# Patient Record
Sex: FEMALE | Race: WHITE | Employment: OTHER | URBAN - METROPOLITAN AREA
[De-identification: names, ages, dates, MRNs, and addresses within clinical notes are randomized per-mention and may not be internally consistent; named-entity substitution may affect disease eponyms.]

---

## 2017-01-03 RX ORDER — AZATHIOPRINE 50 MG/1
TABLET ORAL
Qty: 60 TAB | Refills: 3 | Status: SHIPPED | OUTPATIENT
Start: 2017-01-03 | End: 2017-01-05 | Stop reason: SDUPTHER

## 2017-01-05 RX ORDER — TACROLIMUS 1 MG/1
2 CAPSULE ORAL 2 TIMES DAILY
Qty: 120 CAP | Refills: 5 | Status: SHIPPED | OUTPATIENT
Start: 2017-01-05 | End: 2017-08-29 | Stop reason: SDUPTHER

## 2017-01-05 RX ORDER — AZATHIOPRINE 50 MG/1
TABLET ORAL
Qty: 60 TAB | Refills: 3 | Status: SHIPPED | OUTPATIENT
Start: 2017-01-05 | End: 2017-11-29 | Stop reason: SDUPTHER

## 2017-01-30 DIAGNOSIS — K75.4 AUTOIMMUNE HEPATITIS (HCC): ICD-10-CM

## 2017-02-22 ENCOUNTER — OFFICE VISIT (OUTPATIENT)
Dept: HEMATOLOGY | Age: 67
End: 2017-02-22

## 2017-02-22 VITALS
TEMPERATURE: 98.9 F | OXYGEN SATURATION: 95 % | DIASTOLIC BLOOD PRESSURE: 90 MMHG | BODY MASS INDEX: 24.33 KG/M2 | WEIGHT: 132.2 LBS | HEART RATE: 77 BPM | RESPIRATION RATE: 19 BRPM | HEIGHT: 62 IN | SYSTOLIC BLOOD PRESSURE: 154 MMHG

## 2017-02-22 DIAGNOSIS — R93.3 ABNORMAL FINDINGS ON DIAGNOSTIC IMAGING OF OTHER PARTS OF DIGESTIVE TRACT: ICD-10-CM

## 2017-02-22 DIAGNOSIS — K75.4 AUTOIMMUNE HEPATITIS (HCC): Primary | ICD-10-CM

## 2017-02-22 DIAGNOSIS — R79.9 ABNORMAL FINDING OF BLOOD CHEMISTRY: ICD-10-CM

## 2017-02-22 RX ORDER — PREDNISONE 5 MG/1
5 TABLET ORAL DAILY
Qty: 30 TAB | Refills: 3 | Status: SHIPPED | OUTPATIENT
Start: 2017-02-22 | End: 2018-04-25

## 2017-02-22 NOTE — PROGRESS NOTES
[address] White MD, FACP, [facility]     April MARY ANN Tong PA-C Laymon Evangelist, MD, MD Dwain Rainey NP Ebony Rape, NP Laan Van Nieuw [facility], [address] [facility], [address]     [phone]     FAX: [address] [place] [address], [address] [place] [address], [address]     [phone]     FAX: [phone]       Patient Care Team:  Duc Reyes MD as PCP - General (Family Practice)  Kiara Segovia MD (Gastroenterology)      Problem List  Date Reviewed: 11/16/2016          Codes Class Noted    Autoimmune hepatitis [facility]) ICD-10-CM: K75.4  ICD-9-CM: 571.42  3/18/2016        Hypertension ICD-10-CM: I10  ICD-9-CM: 401.9  3/18/2016        Hypercholesterolemia ICD-10-CM: E78.00  ICD-9-CM: 272.0  3/18/2016              Ally Velazquez returns to the [address] for management of autoimmune Hepatitis. The active problem list, all pertinent past medical history, medications, liver histology, and laboratory findings related to the liver disorder were reviewed with the patient. The patient is a 78 y.o.  female who was first noted to have abnormalities in liver transaminases into the 1000 range in 12/2015. Serologic evaluation for markers of chronic liver disease were positive for KATLYN and ASMA. Ultrasound of the liver was performed in 7/2015. The results of the imaging demonstrated a normal appearing liver. A liver biopsy was performed in 11/2015. The biopsy slides were reviewed by Dr. Rosario Nicole and demonstrated a severe active hepatitis with bridging necrosis and fibrosis. The patient had been taking Lipitor. This was stopped when the liver enzymes were noted to be elevated. The liver enzymes have remain elevated. She has not yet restarted treatment for cholesterol.     The patient initiated treatment with prednisone 30 mg every day and imuran 50 mg every day in 11/2015. The liver enzymes declined from 1000 to about 300-400 range and then stabilized. Since establishing with this office, we had increased the dose of the Imuran to 100 mg daily and decreased the prednisone to 20 mg daily. She had been on this dose for ~6 months and due to lack of response to therapy, we added tacrolimus to her regimen in 10/2017. Initially, she had been taking 1 mg bid instead of 2 mg bid as ordered, but increased to full 2 mg bid dosing in 11/2016. She is tolerating medications well without GI side effects. We have continued to decrease dosing of prednisone and she is now on 10 mg for the past 2 months. She denies insomnia, irritability with ongoing use of prednisone but is anxious to discontinue this dose as soon as is reasonable. The most recent laboratory studies indicate that the liver transaminases are minimally elevated, ALP is normal, tests of hepatic synthetic and metabolic function are normal, and the platelet count is normal.  Her renal function and tacrolimus levels are appropriate. The patient has no symptoms which could be attributed to the liver disorder. The patient completes all daily activities without any functional limitations. The patient has not experienced fatigue, pain in the right side over the liver, problems concentrating, swelling of the abdomen, swelling of the lower extremities, hematemesis, hematochezia. Her only new concerns at this time is of some right sided rib pain. She thinks she may have injured the are in leaning into a large washing machine recently. This appears to be decreasing in severity on its own. ALLERGIES  No Known Allergies    MEDICATIONS  Current Outpatient Prescriptions   Medication Sig    azaTHIOprine (IMURAN) 50 mg tablet TAKE 1 TABLET BY MOUTH TWICE A DAY    tacrolimus (PROGRAF) 1 mg capsule Take 2 Caps by mouth two (2) times a day.  lisinopril (PRINIVIL, ZESTRIL) 20 mg tablet Take  by mouth daily.  predniSONE (DELTASONE) 10 mg tablet Take  by mouth three (3) times daily.  diphenhydrAMINE (BENADRYL) 25 mg capsule Take 25 mg by mouth once. No current facility-administered medications for this visit. SYSTEM REVIEW NOT RELATED TO LIVER DISEASE OR REVIEWED ABOVE:  Constitution systems: Negative for fever, chills, weight gain, weight loss. Eyes: Negative for visual changes. ENT: Negative for sore throat, painful swallowing. Respiratory: Negative for cough, hemoptysis, SOB. Cardiology: Negative for chest pain, palpitations. GI:  Negative for constipation or diarrhea. : Negative for urinary frequency, dysuria, hematuria, nocturia. Skin: Negative for rash. Hematology: Negative for easy bruising, blood clots. Musculo-skeletal: Negative for back pain, muscle pain, weakness. Neurologic: Negative for headaches, dizziness, vertigo, memory problems not related to HE. Psychology: Negative for anxiety, depression. FAMILY HISTORY:  The father  at age 80 years. The mother is alive and healthy at age 80 years. There is no family history of liver disease. There is no family history of immune disorders. SOCIAL HISTORY:  The patient is . The patient has 3 children, and 5 grandchildren. The patient has never used tobacco products. The patient consumes 1 alcoholic beverages per day historically. She discontinued alcohol with recognition of liver problem. The patient used to work in banking. The patient retired in . PHYSICAL EXAMINATION:  Visit Vitals    /90 (BP 1 Location: Left arm, BP Patient Position: Sitting)    Pulse 77    Temp 98.9 °F (37.2 °C) (Tympanic)    Resp 19    Ht 5' 2\" (1.575 m)    Wt 132 lb 3.2 oz (60 kg)    SpO2 95%    BMI 24.18 kg/m2     General: No acute distress. Eyes: Sclera anicteric. ENT: No oral lesions. Thyroid normal.  Nodes: No adenopathy. Skin: No spider angiomata. No jaundice. No palmar erythema. Respiratory: Lungs clear to auscultation. Cardiovascular: Regular heart rate. No murmurs. No JVD. Abdomen: Soft non-tender. Liver size normal to percussion/palpation. Spleen not palpable. No obvious ascites. Extremities: No edema. No muscle wasting. No gross arthritic changes. Neurologic: Alert and oriented. Cranial nerves grossly intact. No asterixis. LABORATORY STUDIES:  [facility] of [address] [place] [address] Units 1/26/2017 12/12/2016 11/14/2016 10/24/2016 9/15/2016 7/11/2016 6/14/2016   WBC 3.4 - 10.8 x10E3/uL 8.7 9.2 8.9 8.5 5.8 7.2 5.7   ANC 1.4 - 7.0 x10E3/uL - - - - - - -   HGB 11.1 - 15.9 g/dL 14.1 14.4 15.2 15.0 14.6 14.4 14.8    - 379 x10E3/uL 198 219 202 200 198 212 202   AST 0 - 40 IU/L 45(H) 40 43(H) 48(H) 68(H) 67(H) 85(H)   ALT 0 - 32 IU/L 38(H) 35(H) 43(H) 50(H) 65(H) 74(H) 91(H)   Alk Phos 39 - 117 IU/L 51 51 53 50 54 52 57   Bili, Total 0.0 - 1.2 mg/dL 0.7 0.3 0.5 0.4 0.4 0.5 0.7   Bili, Direct 0.00 - 0.40 mg/dL 0.18 0.09 0.14 0.14 0.14 0.15 0.17   Albumin 3.6 - 4.8 g/dL 4.6 4.5 4.7 4.5 4.3 4.4 4.2   BUN 8 - 27 mg/dL 18 23 15 15 15 10 14   Creat 0.57 - 1.00 mg/dL 0.87 0.91 0.83 0.87 0.80 0.73 0.83   Na 134 - 144 mmol/L 139 142 141 139 138 141 140   K 3.5 - 5.2 mmol/L 4.7 4.6 4.4 4.3 4.5 4.4 4.5   Cl 96 - 106 mmol/L 98 100 100 97 97 101 96(L)   CO2 18 - 29 mmol/L 23 22 23 23 26 23 22   Glucose 65 - 99 mg/dL 120(H) 143(H) 132(H) 137(H) 101(H) 126(H) 141(H)     SEROLOGIES:  Serologies Latest Ref Rng 3/18/2016   Hep A Ab, Total Negative Positive (A)   Hep B Surface Ag Negative Negative   KATLYN, IFA  See patterns   KATLYN Pattern  1:1280 (H)   C-ANCA Neg:<1:20 titer <1:20   P-ANCA Neg:<1:20 titer <1:20   ANCA Neg:<1:20 titer <1:20   ASMCA 0 - 19 Units 71 (H)   M2 Ab 0.0 - 20.0 Units 11.4   Anti-SLA 0.0 - 20.0 units 1.0   LKM 0.0 - 20.0 Units 1.4   10/2015.   Anti-HCV negative, KATLYN negative, ASMA positive    LIVER HISTOLOGY:  11/2015. Slides reviewed by MLS. Severe active hepatitis with PMN, bridging necrosis and bridging fibrosis. 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 She has been on this combination of doses for the past roughly 5-6 weeks and is tolerating medications well. She continues to have stable liver enzymes at the upper limit of normal.  I have advised her to decrease the dose of the prednisone to 7.5 mg daily, maintain the azathioprine at 100 mg daily and tacrolimus at 2 mg bid. We will then have her follow-up for repeat lab testing and tac level in 6 weeks for possible further reduction in prednisone. Will perform laboratory testing to monitor liver function and degree of liver injury every 6-12 weeks. This will include BMP, hepatic panel, CBC with platelet count and tac level. Serologic and virologic studies to assess for other causes of chronic liver disease were negative. Hyperlipidemia. Patient reports that she has been off of any sort of cholesterol lowering agent for >1 year. I would recommend delay of initiation of this medication until her liver enzymes normalize with therapy. This will reduce confusion of response to immunosuppression if she should have a flare in enzymes due to statin use. Would recommend avoiding Lipitor in the future. Elevated glucose levels. This has been high on occasion in past labs, but likely reflects her long-term use of prednisone. Will draw Hgb A1c at next assessment. The patient was directed to continue all current medications at the current dosages.   There are no contraindications for the patient to take any medications that are necessary for treatment of other medical issues. The patient was counseled regarding alcohol consumption. She was advised to continue with moderation of alcohol until we get the process under control. The need for vaccination against viral hepatitis B will be assessed with serologic and instituted as appropriate. She is immune to hepatitis A. All of the above issues were discussed with the patient. All questions were answered. The patient expressed a clear understanding of the above. [address] [place] [address] in 6 weeks for repeat lab studies, 12 weeks for office visit.     Franco Swan PA-C  Liver [place] [address], [address] [address]  [address].  [phone]

## 2017-02-22 NOTE — MR AVS SNAPSHOT
Visit Information Date & Time Provider Department Dept. Phone Encounter #  
 2/22/2017  9:00 AM [facility] [address] [facility] [id number] Follow-up Instructions Return in about 3 months (around 5/22/2017) for Ezra Salazar. Your Appointments 5/22/2017  2:00 PM  
Follow Up with CHRISTOPH [facility]inal [facility] [address] ([facility] Appt Note: follow up [address] [facility] [place] [phone] [facility] [address]  
[address] [facility] [place] [address] Upcoming Health Maintenance Date Due Hepatitis C Screening 1950 DTaP/Tdap/Td series (1 - Tdap) 1/29/1972 BREAST CANCER SCRN MAMMOGRAM 1/29/2001 FOBT Q 1 YEAR AGE 50-75 1/29/2001 ZOSTER VACCINE AGE 60> 1/29/2011 GLAUCOMA SCREENING Q2Y 1/29/2016 OSTEOPOROSIS SCREENING (DEXA) 1/29/2016 Pneumococcal 65+ Low/Medium Risk (1 of 2 - PCV13) 1/29/2016 MEDICARE YEARLY EXAM 1/29/2016 INFLUENZA AGE 9 TO ADULT 8/1/2016 Allergies as of 2/22/2017  Review Complete On: 2/22/2017 By: CHRISTOPH Espinal No Known Allergies Current Immunizations  Never Reviewed No immunizations on file. Not reviewed this visit You Were Diagnosed With   
  
 Codes Comments Autoimmune hepatitis ([facility] [address]    -  Primary ICD-10-CM: K75.4 ICD-9-CM: 571.42 Abnormal findings on diagnostic imaging of other parts of digestive tract     ICD-10-CM: R93.3 ICD-9-CM: 793.4 Abnormal finding of blood chemistry     ICD-10-CM: R79.9 ICD-9-CM: 790.6 Vitals BP  
  
  
  
  
  
 154/90 (BP 1 Location: Left arm, BP Patient Position: Sitting) BMI and BSA Data Body Mass Index Body Surface Area  
 24.18 kg/m 2 1.62 m 2 Preferred Pharmacy Pharmacy Name Phone [id number] [address] [phone] Your Updated Medication List  
  
   
 This list is accurate as of: 2/22/17  9:37 AM.  Always use your most recent med list.  
  
  
  
  
 azaTHIOprine 50 mg tablet Commonly known as:  IMURAN  
TAKE 1 TABLET BY MOUTH TWICE A DAY  
  
 diphenhydrAMINE 25 mg capsule Commonly known as:  BENADRYL Take 25 mg by mouth once. lisinopril 20 mg tablet Commonly known as:  Flor Hu Take  by mouth daily. * predniSONE 10 mg tablet Commonly known as:  Reino Matthew Take  by mouth three (3) times daily. * predniSONE 5 mg tablet Commonly known as:  Reino Matthew Take 1 Tab by mouth daily. tacrolimus 1 mg capsule Commonly known as:  PROGRAF Take 2 Caps by mouth two (2) times a day. * Notice: This list has 2 medication(s) that are the same as other medications prescribed for you. Read the directions carefully, and ask your doctor or other care provider to review them with you. Prescriptions Sent to Pharmacy Refills  
 predniSONE (DELTASONE) 5 mg tablet 3 Sig: Take 1 Tab by mouth daily. Class: Normal  
 Pharmacy: [id number] [place], [address] [place] [address] [place] Ph #: [phone] Route: Oral  
  
Follow-up Instructions Return in about 3 months (around 5/22/2017) for Xavier Katz. To-Do List   
 04/03/2017 Lab:  CBC W/O DIFF   
  
 04/03/2017 Lab:  HEMOGLOBIN A1C WITH EAG   
  
 04/03/2017 Lab:  HEPATIC FUNCTION PANEL (6)   
  
 04/03/2017 Lab:  LIPID PANEL   
  
 04/03/2017 Lab:  METABOLIC PANEL, BASIC   
  
 04/03/2017 Lab:  TACROLIMUS, WHOLE BLOOD Introducing [facility]! Dear Jaswinder Santana: Thank you for requesting a [employer] account. Our records indicate that you already have an active [employer] account. You can access your account anytime at [email or web address] Did you know that you can access your hospital and ER discharge instructions at any time in [employer]?   You can also review all of your test results from your hospital stay or ER visit. Additional Information If you have questions, please visit the Frequently Asked Questions section of the [employer] website at [email or web address] Remember, [employer] is NOT to be used for urgent needs. For medical emergencies, dial 911. Now available from your iPhone and Android! Please provide this summary of care documentation to your next provider. Your primary care clinician is listed as Christina Andrade. If you have any questions after today's visit, please call [phone].

## 2017-04-03 ENCOUNTER — TELEPHONE (OUTPATIENT)
Dept: HEMATOLOGY | Age: 67
End: 2017-04-03

## 2017-04-03 DIAGNOSIS — R79.9 ABNORMAL FINDING OF BLOOD CHEMISTRY: ICD-10-CM

## 2017-04-03 DIAGNOSIS — R93.3 ABNORMAL FINDINGS ON DIAGNOSTIC IMAGING OF OTHER PARTS OF DIGESTIVE TRACT: ICD-10-CM

## 2017-04-03 DIAGNOSIS — K75.4 AUTOIMMUNE HEPATITIS (HCC): ICD-10-CM

## 2017-04-07 LAB
ALBUMIN SERPL-MCNC: 4.4 G/DL (ref 3.6–4.8)
ALP SERPL-CCNC: 50 IU/L (ref 39–117)
ALT SERPL-CCNC: 30 IU/L (ref 0–32)
AST SERPL-CCNC: 35 IU/L (ref 0–40)
BILIRUB DIRECT SERPL-MCNC: 0.1 MG/DL (ref 0–0.4)
BILIRUB SERPL-MCNC: 0.3 MG/DL (ref 0–1.2)
BUN SERPL-MCNC: 20 MG/DL (ref 8–27)
BUN/CREAT SERPL: 24 (ref 12–28)
CALCIUM SERPL-MCNC: 9.9 MG/DL (ref 8.7–10.3)
CHLORIDE SERPL-SCNC: 99 MMOL/L (ref 96–106)
CHOLEST SERPL-MCNC: 240 MG/DL (ref 100–199)
CO2 SERPL-SCNC: 22 MMOL/L (ref 18–29)
CREAT SERPL-MCNC: 0.84 MG/DL (ref 0.57–1)
ERYTHROCYTE [DISTWIDTH] IN BLOOD BY AUTOMATED COUNT: 13.5 % (ref 12.3–15.4)
EST. AVERAGE GLUCOSE BLD GHB EST-MCNC: 131 MG/DL
GLUCOSE SERPL-MCNC: 96 MG/DL (ref 65–99)
HBA1C MFR BLD: 6.2 % (ref 4.8–5.6)
HCT VFR BLD AUTO: 42.9 % (ref 34–46.6)
HDLC SERPL-MCNC: 91 MG/DL
HGB BLD-MCNC: 14.4 G/DL (ref 11.1–15.9)
LDLC SERPL CALC-MCNC: 135 MG/DL (ref 0–99)
MCH RBC QN AUTO: 31.7 PG (ref 26.6–33)
MCHC RBC AUTO-ENTMCNC: 33.6 G/DL (ref 31.5–35.7)
MCV RBC AUTO: 95 FL (ref 79–97)
PLATELET # BLD AUTO: 194 X10E3/UL (ref 150–379)
POTASSIUM SERPL-SCNC: 4.5 MMOL/L (ref 3.5–5.2)
RBC # BLD AUTO: 4.54 X10E6/UL (ref 3.77–5.28)
SODIUM SERPL-SCNC: 141 MMOL/L (ref 134–144)
TRIGL SERPL-MCNC: 71 MG/DL (ref 0–149)
VLDLC SERPL CALC-MCNC: 14 MG/DL (ref 5–40)
WBC # BLD AUTO: 5.1 X10E3/UL (ref 3.4–10.8)

## 2017-04-08 LAB — TACROLIMUS, 700249: 4.2 NG/ML (ref 2–20)

## 2017-04-19 NOTE — PROGRESS NOTES
Spoke with patient re labs. Remain on present dose of tac and decrease pred to 5 mg now. Add psyllium for chol and recheck in 6/2017. Maintain follow-up in one month for repeat labs.

## 2017-05-30 ENCOUNTER — OFFICE VISIT (OUTPATIENT)
Dept: HEMATOLOGY | Age: 67
End: 2017-05-30

## 2017-05-30 VITALS
SYSTOLIC BLOOD PRESSURE: 157 MMHG | DIASTOLIC BLOOD PRESSURE: 90 MMHG | OXYGEN SATURATION: 97 % | BODY MASS INDEX: 24.4 KG/M2 | HEART RATE: 80 BPM | WEIGHT: 133.4 LBS | TEMPERATURE: 98 F

## 2017-05-30 DIAGNOSIS — E78.00 HYPERCHOLESTEROLEMIA: ICD-10-CM

## 2017-05-30 DIAGNOSIS — K75.4 AUTOIMMUNE HEPATITIS (HCC): Primary | ICD-10-CM

## 2017-05-30 NOTE — PROGRESS NOTES
[address], MD, FACP, [facility]     [facility], RICKIE Araujo MD, Nhi Vega MD     [address] [place] [address], MARY ANN Daniels, MARY ANN [place] [address], [address] [address]  22.     [phone]     FAX: [address] [address], [address] [place] [address]     [phone]     FAX: [phone]       Patient Care Team:  Shaun Carpenter MD as PCP - General (Family Practice)  Jono Hughes MD (Gastroenterology)      Problem List  Date Reviewed: 2/22/2017          Codes Class Noted    Autoimmune hepatitis [facility]) ICD-10-CM: K75.4  ICD-9-CM: 571.42  3/18/2016        Hypertension ICD-10-CM: I10  ICD-9-CM: 401.9  3/18/2016        Hypercholesterolemia ICD-10-CM: E78.00  ICD-9-CM: 272.0  3/18/2016              Penny Saucedo returns to the [address] for management of autoimmune Hepatitis. The active problem list, all pertinent past medical history, medications, liver histology, and laboratory findings related to the liver disorder were reviewed with the patient. The patient is a 78 y.o.  female who was first noted to have abnormalities in liver transaminases into the 1000 range in 12/2015. Serologic evaluation for markers of chronic liver disease were positive for KATLYN and ASMA. Ultrasound of the liver was performed in 7/2015. The results of the imaging demonstrated a normal appearing liver. A liver biopsy was performed in 11/2015. The biopsy slides were reviewed by Dr. Dylan Holden and demonstrated a severe active hepatitis with bridging necrosis and fibrosis. The patient had been taking Lipitor. This was stopped when the liver enzymes were noted to be elevated. The patient relates the timing of timing of the elevation with when she was switched from Crestor to Lipitor.  The liver enzymes remained elevated following discontinuation of statin. She has not yet restarted treatment for cholesterol. She has started increase in dietary fiber/psyllium. The patient initiated treatment with prednisone 30 mg every day and imuran 50 mg every day in 11/2015. The liver enzymes declined from 1000 to about 300-400 range and then stabilized. Since establishing with this office, we had increased the dose of the Imuran to 100 mg daily and decreased the prednisone to 20 mg daily. She had been on this dose for ~6 months and due to lack of response to therapy, we added tacrolimus to her regimen in 10/2017. Initially, she had been taking 1 mg bid instead of 2 mg bid as ordered, but increased to full 2 mg bid dosing in 11/2016. She is tolerating medications well without GI side effects. We have continued to decrease dosing of prednisone and she is now on 5 mg for the past 2 months. She denies insomnia, irritability with ongoing use of prednisone but is anxious to discontinue this dose as soon as is reasonable. The most recent laboratory studies indicate that the liver transaminases have normalized, ALP is normal, tests of hepatic synthetic and metabolic function are normal, and the platelet count is normal.  Her renal function and tacrolimus levels are appropriate. The patient has no symptoms which could be attributed to the liver disorder. The patient completes all daily activities without any functional limitations. The patient has not experienced fatigue, pain in the right side over the liver, problems concentrating, swelling of the abdomen, swelling of the lower extremities, hematemesis, hematochezia. We have continued to watch this patient's blood pressure. She believes that this is white coat hypertension as this has been a long standing issue. She is on medication that could be elevating her BP in general as well.     ALLERGIES  No Known Allergies    MEDICATIONS  Current Outpatient Prescriptions   Medication Sig    predniSONE (DELTASONE) 5 mg tablet Take 1 Tab by mouth daily.  azaTHIOprine (IMURAN) 50 mg tablet TAKE 1 TABLET BY MOUTH TWICE A DAY    tacrolimus (PROGRAF) 1 mg capsule Take 2 Caps by mouth two (2) times a day.  lisinopril (PRINIVIL, ZESTRIL) 20 mg tablet Take  by mouth daily.  predniSONE (DELTASONE) 10 mg tablet Take  by mouth three (3) times daily.  diphenhydrAMINE (BENADRYL) 25 mg capsule Take 25 mg by mouth once. No current facility-administered medications for this visit. SYSTEM REVIEW NOT RELATED TO LIVER DISEASE OR REVIEWED ABOVE:  Constitution systems: Negative for fever, chills, weight gain, weight loss. Eyes: Negative for visual changes. ENT: Negative for sore throat, painful swallowing. Respiratory: Negative for cough, hemoptysis, SOB. Cardiology: Negative for chest pain, palpitations. GI:  Negative for constipation or diarrhea. : Negative for urinary frequency, dysuria, hematuria, nocturia. Skin: Negative for rash. Hematology: Negative for easy bruising, blood clots. Musculo-skeletal: Negative for back pain, muscle pain, weakness. Neurologic: Negative for headaches, dizziness, vertigo, memory problems not related to HE. Psychology: Negative for anxiety, depression. FAMILY HISTORY:  The father  at age 80 years. The mother is alive and healthy at age 80 years. There is no family history of liver disease. There is no family history of immune disorders. SOCIAL HISTORY:  The patient is . The patient has 3 children, and 5 grandchildren. The patient has never used tobacco products. The patient consumes 1 alcoholic beverages per day historically. She discontinued alcohol with recognition of liver problem. The patient used to work in banking. The patient retired in .       PHYSICAL EXAMINATION:  Visit Vitals    /90    Pulse 80    Temp 98 °F (36.7 °C) (Oral)    Wt 133 lb 6.4 oz (60.5 kg)    SpO2 97%    BMI 24.4 kg/m2     General: No acute distress. Eyes: Sclera anicteric. ENT: No oral lesions. Thyroid normal.  Nodes: No adenopathy. Skin: No spider angiomata. No jaundice. No palmar erythema. Respiratory: Lungs clear to auscultation. Cardiovascular: Regular heart rate. No murmurs. No JVD. Abdomen: Soft non-tender. Liver size normal to percussion/palpation. Spleen not palpable. No obvious ascites. Extremities: No edema. No muscle wasting. No gross arthritic changes. Neurologic: Alert and oriented. Cranial nerves grossly intact. No asterixis. LABORATORY STUDIES:  [facility] [place] [facility] [address] [place] [address] 4/6/2017 1/26/2017 12/12/2016 11/14/2016 10/24/2016 9/15/2016 7/11/2016   WBC 3.4 - 10.8 x10E3/uL 5.1 8.7 9.2 8.9 8.5 5.8 7.2   ANC 1.4 - 7.0 x10E3/uL - - - - - - -   HGB 11.1 - 15.9 g/dL 14.4 14.1 14.4 15.2 15.0 14.6 14.4    - 379 x10E3/uL 194 198 219 202 200 198 212   AST 0 - 40 IU/L 35 45(H) 40 43(H) 48(H) 68(H) 67(H)   ALT 0 - 32 IU/L 30 38(H) 35(H) 43(H) 50(H) 65(H) 74(H)   Alk Phos 39 - 117 IU/L 50 51 51 53 50 54 52   Bili, Total 0.0 - 1.2 mg/dL 0.3 0.7 0.3 0.5 0.4 0.4 0.5   Bili, Direct 0.00 - 0.40 mg/dL 0.10 0.18 0.09 0.14 0.14 0.14 0.15   Albumin 3.6 - 4.8 g/dL 4.4 4.6 4.5 4.7 4.5 4.3 4.4   BUN 8 - 27 mg/dL 20 18 23 15 15 15 10   Creat 0.57 - 1.00 mg/dL 0.84 0.87 0.91 0.83 0.87 0.80 0.73   Na 134 - 144 mmol/L 141 139 142 141 139 138 141   K 3.5 - 5.2 mmol/L 4.5 4.7 4.6 4.4 4.3 4.5 4.4   Cl 96 - 106 mmol/L 99 98 100 100 97 97 101   CO2 18 - 29 mmol/L 22 23 22 23 23 26 23   Glucose 65 - 99 mg/dL 96 120(H) 143(H) 132(H) 137(H) 101(H) 126(H)   Additional lab values drawn this week are pending at the time of documentation.     SEROLOGIES:  Serologies Latest Ref Rng 3/18/2016   Hep A Ab, Total Negative Positive (A)   Hep B Surface Ag Negative Negative   KATLYN, IFA  See patterns   KATLYN Pattern  1:1280 (H)   C-ANCA Neg:<1:20 titer <1:20   P-ANCA Neg:<1:20 titer <1:20   ANCA Neg:<1:20 titer <1:20   ASMCA 0 - 19 Units 71 (H)   M2 Ab 0.0 - 20.0 Units 11.4   Anti-SLA 0.0 - 20.0 units 1.0   LKM 0.0 - 20.0 Units 1.4   10/2015. Anti-HCV negative, KATLYN negative, ASMA positive    LIVER HISTOLOGY:  11/2015. Slides reviewed by MLS. Severe active hepatitis with PMN, bridging necrosis and bridging fibrosis. 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 She has been on this combination of doses for the past roughly 6+ weeks and is tolerating medications well. She has had normalization of her liver enzymes. We are awaiting a trough level lab draw later this week and I have advised her that if these remain normal, we will decrease the dose of the prednisone to 5 mg every other day, then discontinue altogether maintain the azathioprine at 100 mg daily and tacrolimus at 2 mg bid. We will then have her follow-up for repeat lab testing and tac level in 6 weeks for assessment of stability. Will perform laboratory testing to monitor liver function and degree of liver injury every 6-12 weeks. This will include BMP, hepatic panel, CBC with platelet count and tac level. Serologic and virologic studies to assess for other causes of chronic liver disease were negative. Hyperlipidemia. Patient reports that she has been off of any sort of cholesterol lowering agent for >1 year. Now that her liver enzymes have normalized with therapy, it would be reasonable to reinstitute statin if indicated. She is taking increased dietary fiber and will have her lipid panel rechecked with her PCP in late 6/2017.   Would recommend avoiding Lipitor in the future. Hypertension. I have asked Mrs. Pepper to monitor her BP outside of the clinical environment to gauge whether she needs to initiate medical management. Her immunosuppression medication could drive up her BP, and we will continue to monitor after the cessation of prednisone. The patient was directed to continue all current medications at the current dosages. There are no contraindications for the patient to take any medications that are necessary for treatment of other medical issues. The patient was counseled regarding alcohol consumption. She was advised to continue with moderation of alcohol until we get the process under control. The need for vaccination against viral hepatitis B will be assessed with serologic and instituted as appropriate. She is immune to hepatitis A. All of the above issues were discussed with the patient. All questions were answered. The patient expressed a clear understanding of the above. [address] [place] [address] in 6 weeks for repeat lab studies, 12 weeks for office visit.     Stephanie Garcia PA-C  [facility] [place] [address], [address] [address]  22.  [phone]

## 2017-05-30 NOTE — MR AVS SNAPSHOT
Visit Information Date & Time Provider Department Dept. Phone Encounter #  
 5/30/2017  1:00 PM Ruben Wolff, [facility] [address] [facility] [id number] Upcoming Health Maintenance Date Due Hepatitis C Screening 1950 DTaP/Tdap/Td series (1 - Tdap) 1/29/1972 BREAST CANCER SCRN MAMMOGRAM 1/29/2001 FOBT Q 1 YEAR AGE 50-75 1/29/2001 ZOSTER VACCINE AGE 60> 1/29/2011 GLAUCOMA SCREENING Q2Y 1/29/2016 OSTEOPOROSIS SCREENING (DEXA) 1/29/2016 Pneumococcal 65+ Low/Medium Risk (1 of 2 - PCV13) 1/29/2016 MEDICARE YEARLY EXAM 1/29/2016 INFLUENZA AGE 9 TO ADULT 8/1/2017 Allergies as of 5/30/2017  Review Complete On: 5/30/2017 By: Marcelo Calderón No Known Allergies Current Immunizations  Never Reviewed No immunizations on file. Not reviewed this visit You Were Diagnosed With   
  
 Codes Comments Autoimmune hepatitis ([facility] [address]    -  Primary ICD-10-CM: K75.4 ICD-9-CM: 571.42 Hypercholesterolemia     ICD-10-CM: E78.00 ICD-9-CM: 272.0 Vitals BP Pulse Temp Weight(growth percentile) SpO2 BMI  
 157/90 80 98 °F (36.7 °C) (Oral) 133 lb 6.4 oz (60.5 kg) 97% 24.4 kg/m2 OB Status Smoking Status Postmenopausal Never Smoker BMI and BSA Data Body Mass Index Body Surface Area  
 24.4 kg/m 2 1.63 m 2 Preferred Pharmacy Pharmacy Name Phone [id number] [place], [address] [phone] Your Updated Medication List  
  
   
This list is accurate as of: 5/30/17  1:26 PM.  Always use your most recent med list.  
  
  
  
  
 azaTHIOprine 50 mg tablet Commonly known as:  IMURAN  
TAKE 1 TABLET BY MOUTH TWICE A DAY  
  
 diphenhydrAMINE 25 mg capsule Commonly known as:  BENADRYL Take 25 mg by mouth once. lisinopril 20 mg tablet Commonly known as:  Rexanne  Take  by mouth daily. predniSONE 5 mg tablet Commonly known as:  Lars Grief Take 1 Tab by mouth daily. tacrolimus 1 mg capsule Commonly known as:  PROGRAF Take 2 Caps by mouth two (2) times a day. To-Do List   
 05/31/2017 Lab:  CBC W/O DIFF   
  
 05/31/2017 Lab:  HEPATIC FUNCTION PANEL (6)   
  
 05/31/2017 Lab:  METABOLIC PANEL, BASIC   
  
 05/31/2017 Lab:  TACROLIMUS, WHOLE BLOOD Introducing [facility]! Dear Zakiya Epps: Thank you for requesting a [employer] account. Our records indicate that you already have an active [employer] account. You can access your account anytime at [email or web address] Did you know that you can access your hospital and ER discharge instructions at any time in [employer]? You can also review all of your test results from your hospital stay or ER visit. Additional Information If you have questions, please visit the Frequently Asked Questions section of the [employer] website at [email or web address] Remember, [employer] is NOT to be used for urgent needs. For medical emergencies, dial 911. Now available from your iPhone and Android! Please provide this summary of care documentation to your next provider. Your primary care clinician is listed as Christopher Nicholson. If you have any questions after today's visit, please call [phone].

## 2017-05-31 DIAGNOSIS — K75.4 AUTOIMMUNE HEPATITIS (HCC): ICD-10-CM

## 2017-06-08 DIAGNOSIS — K75.4 AUTOIMMUNE HEPATITIS (HCC): Primary | ICD-10-CM

## 2017-07-03 RX ORDER — TACROLIMUS 1 MG/1
CAPSULE ORAL
Qty: 120 CAP | Refills: 5 | Status: SHIPPED | OUTPATIENT
Start: 2017-07-03 | End: 2017-10-03 | Stop reason: SDUPTHER

## 2017-07-20 DIAGNOSIS — E78.00 HYPERCHOLESTEROLEMIA: Primary | ICD-10-CM

## 2017-08-01 RX ORDER — AZATHIOPRINE 50 MG/1
TABLET ORAL
Qty: 60 TAB | Refills: 3 | Status: SHIPPED | OUTPATIENT
Start: 2017-08-01 | End: 2017-08-29 | Stop reason: SDUPTHER

## 2017-08-09 LAB
ALBUMIN SERPL-MCNC: 4.6 G/DL (ref 3.6–4.8)
ALP SERPL-CCNC: 62 IU/L (ref 39–117)
ALT SERPL-CCNC: 23 IU/L (ref 0–32)
AST SERPL-CCNC: 28 IU/L (ref 0–40)
BILIRUB DIRECT SERPL-MCNC: 0.13 MG/DL (ref 0–0.4)
BILIRUB SERPL-MCNC: 0.5 MG/DL (ref 0–1.2)
BUN SERPL-MCNC: 20 MG/DL (ref 8–27)
BUN/CREAT SERPL: 21 (ref 12–28)
CALCIUM SERPL-MCNC: 9.9 MG/DL (ref 8.7–10.3)
CHLORIDE SERPL-SCNC: 98 MMOL/L (ref 96–106)
CHOLEST SERPL-MCNC: 172 MG/DL (ref 100–199)
CO2 SERPL-SCNC: 24 MMOL/L (ref 18–29)
CREAT SERPL-MCNC: 0.97 MG/DL (ref 0.57–1)
ERYTHROCYTE [DISTWIDTH] IN BLOOD BY AUTOMATED COUNT: 13.5 % (ref 12.3–15.4)
GLUCOSE SERPL-MCNC: 103 MG/DL (ref 65–99)
HCT VFR BLD AUTO: 39.1 % (ref 34–46.6)
HDLC SERPL-MCNC: 87 MG/DL
HGB BLD-MCNC: 13.9 G/DL (ref 11.1–15.9)
LDLC SERPL CALC-MCNC: 73 MG/DL (ref 0–99)
MCH RBC QN AUTO: 32.6 PG (ref 26.6–33)
MCHC RBC AUTO-ENTMCNC: 35.5 G/DL (ref 31.5–35.7)
MCV RBC AUTO: 92 FL (ref 79–97)
PLATELET # BLD AUTO: 195 X10E3/UL (ref 150–379)
POTASSIUM SERPL-SCNC: 5.1 MMOL/L (ref 3.5–5.2)
RBC # BLD AUTO: 4.27 X10E6/UL (ref 3.77–5.28)
SODIUM SERPL-SCNC: 139 MMOL/L (ref 134–144)
TACROLIMUS, 700249: 5.8 NG/ML (ref 2–20)
TRIGL SERPL-MCNC: 59 MG/DL (ref 0–149)
VLDLC SERPL CALC-MCNC: 12 MG/DL (ref 5–40)
WBC # BLD AUTO: 3.7 X10E3/UL (ref 3.4–10.8)

## 2017-08-29 ENCOUNTER — OFFICE VISIT (OUTPATIENT)
Dept: HEMATOLOGY | Age: 67
End: 2017-08-29

## 2017-08-29 VITALS
BODY MASS INDEX: 24.07 KG/M2 | OXYGEN SATURATION: 99 % | WEIGHT: 131.6 LBS | HEART RATE: 68 BPM | SYSTOLIC BLOOD PRESSURE: 141 MMHG | TEMPERATURE: 98.6 F | DIASTOLIC BLOOD PRESSURE: 81 MMHG

## 2017-08-29 DIAGNOSIS — K75.4 AUTOIMMUNE HEPATITIS (HCC): Primary | ICD-10-CM

## 2017-08-29 RX ORDER — ROSUVASTATIN CALCIUM 10 MG/1
10 TABLET, COATED ORAL DAILY
COMMUNITY
Start: 2017-08-28

## 2017-08-29 NOTE — PROGRESS NOTES
[address] White MD, FACP, Cite Arturo Garcia     April Lori Serna, MARY ANN Wilkinson, RICKIE Quarles MD, DUSTY [facility], Kirit Bower MD     [address], MARY ANN Rodriguez, MARY ANN        [address], [address] [facility], [address]     [phone]     FAX: [address] [place] [address], [address] [facility], [address]     [phone]     FAX: [phone]       Patient Care Team:  Tiffani Jack MD as PCP - General (Family Practice)  Eloisa Campbell MD (Gastroenterology)      Problem List  Date Reviewed: 5/30/2017          Codes Class Noted    Autoimmune hepatitis [facility]) ICD-10-CM: K75.4  ICD-9-CM: 571.42  3/18/2016        Hypertension ICD-10-CM: I10  ICD-9-CM: 401.9  3/18/2016        Hypercholesterolemia ICD-10-CM: E78.00  ICD-9-CM: 272.0  3/18/2016              Faarz Abreu returns to the [facility] for management of autoimmune Hepatitis. The active problem list, all pertinent past medical history, medications, liver histology, and laboratory findings related to the liver disorder were reviewed with the patient. The patient is a 78 y.o.  female who was first noted to have abnormalities in liver transaminases into the 1000 range in 12/2015. Serologic evaluation for markers of chronic liver disease were positive for KATLYN and ASMA. Ultrasound of the liver was performed in 7/2015. The results of the imaging demonstrated a normal appearing liver. A liver biopsy was performed in 11/2015. The biopsy slides were reviewed by Dr. Reji Young and demonstrated a severe active hepatitis with bridging necrosis and fibrosis. The patient had been taking Lipitor. This was stopped when the liver enzymes were noted to be elevated. The patient relates the timing of timing of the elevation with when she was switched from Crestor to Lipitor.  The liver enzymes remained elevated following discontinuation of statin. She has recently restarted treatment for cholesterol with Crestor again. The patient initiated treatment with prednisone 30 mg every day and imuran 50 mg every day in 11/2015. The liver enzymes declined from 1000 to about 300-400 range and then stabilized. Since establishing with this office, we had increased the dose of the Imuran to 100 mg daily and decreased the prednisone to 20 mg daily. She had been on this dose for ~6 months and due to lack of response to therapy, we added tacrolimus to her regimen in 10/2017. Initially, she had been taking 1 mg bid instead of 2 mg bid as ordered, but increased to full 2 mg bid dosing in 11/2016. She is tolerating medications well without GI side effects. We have continued to decrease dosing of prednisone and she has now been off this medication for a full 2 months. She has no new medication-related side effects at present and feels well in general.    The most recent laboratory studies indicate that the liver transaminases have normalized, ALP is normal, tests of hepatic synthetic and metabolic function are normal, and the platelet count is normal.  Her renal function and tacrolimus levels are appropriate. The patient has no symptoms which could be attributed to the liver disorder. The patient completes all daily activities without any functional limitations. The patient has not experienced fatigue, pain in the right side over the liver, problems concentrating, swelling of the abdomen, swelling of the lower extremities, hematemesis, hematochezia. We have continued to watch this patient's blood pressure. She believes that this is white coat hypertension as this has been a long standing issue. She is on medication that could be elevating her BP in general as well.     She has started on Crestor again as of ~4-5 weeks ago and has had a dramatic response to the restart of this medication with excellent tolerance. ALLERGIES  No Known Allergies    MEDICATIONS  Current Outpatient Prescriptions   Medication Sig    tacrolimus (PROGRAF) 1 mg capsule TAKE 2 CAPS BY MOUTH TWO (2) TIMES A DAY.  azaTHIOprine (IMURAN) 50 mg tablet TAKE 1 TABLET BY MOUTH TWICE A DAY    lisinopril (PRINIVIL, ZESTRIL) 20 mg tablet Take  by mouth daily.  diphenhydrAMINE (BENADRYL) 25 mg capsule Take 25 mg by mouth once.  rosuvastatin (CRESTOR) 10 mg tablet Take 10 mg by mouth daily.  predniSONE (DELTASONE) 5 mg tablet Take 1 Tab by mouth daily. No current facility-administered medications for this visit. SYSTEM REVIEW NOT RELATED TO LIVER DISEASE OR REVIEWED ABOVE:  Constitution systems: Negative for fever, chills, weight gain, weight loss. Eyes: Negative for visual changes. ENT: Negative for sore throat, painful swallowing. Respiratory: Negative for cough, hemoptysis, SOB. Cardiology: Negative for chest pain, palpitations. GI:  Negative for constipation or diarrhea. : Negative for urinary frequency, dysuria, hematuria, nocturia. Skin: Negative for rash. Hematology: Negative for easy bruising, blood clots. Musculo-skeletal: Negative for back pain, muscle pain, weakness. Neurologic: Negative for headaches, dizziness, vertigo, memory problems not related to HE. Psychology: Negative for anxiety, depression. FAMILY HISTORY:  The father  at age 80 years. The mother is alive and healthy at age 80 years. There is no family history of liver disease. There is no family history of immune disorders. SOCIAL HISTORY:  The patient is . The patient has 3 children, and 5 grandchildren. The patient has never used tobacco products. The patient consumes 1 alcoholic beverages per day historically. She discontinued alcohol with recognition of liver problem. The patient used to work in banking. The patient retired in .       PHYSICAL EXAMINATION:  Visit Vitals    BP 141/81    Pulse 68    Temp 98.6 °F (37 °C)    Wt 131 lb 9.6 oz (59.7 kg)    SpO2 99%    BMI 24.07 kg/m2     General: No acute distress. Eyes: Sclera anicteric. ENT: No oral lesions. Thyroid normal.  Nodes: No adenopathy. Skin: No spider angiomata. No jaundice. No palmar erythema. Respiratory: Lungs clear to auscultation. Cardiovascular: Regular heart rate. No murmurs. No JVD. Abdomen: Soft non-tender. Liver size normal to percussion/palpation. Spleen not palpable. No obvious ascites. Extremities: No edema. No muscle wasting. No gross arthritic changes. Neurologic: Alert and oriented. Cranial nerves grossly intact. No asterixis.     LABORATORY STUDIES:  [facility] [address] [place] [address] 8/8/2017 6/1/2017 4/6/2017 1/26/2017 12/12/2016 11/14/2016 10/24/2016   WBC 3.4 - 10.8 x10E3/uL 3.7 7.6 5.1 8.7 9.2 8.9 8.5   ANC 1.4 - 7.0 x10E3/uL - - - - - - -   HGB 11.1 - 15.9 g/dL 13.9 14.4 14.4 14.1 14.4 15.2 15.0    - 379 x10E3/uL 195 210 194 198 219 202 200   AST 0 - 40 IU/L 28 36 35 45(H) 40 43(H) 48(H)   ALT 0 - 32 IU/L 23 28 30 38(H) 35(H) 43(H) 50(H)   Alk Phos 39 - 117 IU/L 62 56 50 51 51 53 50   Bili, Total 0.0 - 1.2 mg/dL 0.5 0.4 0.3 0.7 0.3 0.5 0.4   Bili, Direct 0.00 - 0.40 mg/dL 0.13 0.11 0.10 0.18 0.09 0.14 0.14   Albumin 3.6 - 4.8 g/dL 4.6 4.8 4.4 4.6 4.5 4.7 4.5   BUN 8 - 27 mg/dL 20 19 20 18 23 15 15   Creat 0.57 - 1.00 mg/dL 0.97 0.91 0.84 0.87 0.91 0.83 0.87   Na 134 - 144 mmol/L 139 138 141 139 142 141 139   K 3.5 - 5.2 mmol/L 5.1 4.8 4.5 4.7 4.6 4.4 4.3   Cl 96 - 106 mmol/L 98 98 99 98 100 100 97   CO2 18 - 29 mmol/L 24 21 22 23 22 23 23   Glucose 65 - 99 mg/dL 103(H) 127(H) 96 120(H) 143(H) 132(H) 137(H)       SEROLOGIES:  Serologies Latest Ref Rng 3/18/2016   Hep A Ab, Total Negative Positive (A)   Hep B Surface Ag Negative Negative   KATLYN, IFA  See patterns   KATLYN Pattern  1:1280 (H)   C-ANCA Neg:<1:20 titer <1:20   P-ANCA Neg:<1:20 titer <1:20   ANCA Neg:<1:20 titer <1:20   ASMCA 0 - 19 Units 71 (H)   M2 Ab 0.0 - 20.0 Units 11.4   Anti-SLA 0.0 - 20.0 units 1.0   LKM 0.0 - 20.0 Units 1.4   10/2015. Anti-HCV negative, KATLYN negative, ASMA positive    LIVER HISTOLOGY:  11/2015. Slides reviewed by MLS. Severe active hepatitis with PMN, bridging necrosis and bridging fibrosis. 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 She has been on this combination of doses for the past roughly 6+ weeks without prednisone and recent labs have shown normal enzymes. I have discussed with her reducing oses of medication to the lowest that maintain her normal enzymes. Will reduce the tacrolimus dosing to 1 mg bid and keep azathioprine dose at 100 mg daily. If this is tolerated, would consider further reduction in the azathioprine dose to 75 mg. Will have her follow-up for repeat lab testing and tac level in 6 weeks for assessment of stability. Will perform laboratory testing to monitor liver function and degree of liver injury every 6-12 weeks. This will include BMP, hepatic panel, CBC with platelet count and tac level. Serologic and virologic studies to assess for other causes of chronic liver disease were negative. Hyperlipidemia. Patient reports excellent tolerance and response to restart of Crestor. Will continue this at the low dose of 10 mg. Hypertension. I have asked Mrs. Pepper to monitor her BP outside of the clinical environment to gauge whether she needs to initiate medical management. She reports that this has remained normal.  Her immunosuppression medication could drive up her BP.     The patient was directed to continue all current medications at the current dosages. There are no contraindications for the patient to take any medications that are necessary for treatment of other medical issues. The patient was counseled regarding alcohol consumption. She was advised to continue with moderation of alcohol until we get the process under control. The need for vaccination against viral hepatitis B will be assessed with serologic and instituted as appropriate. She is immune to hepatitis A. All of the above issues were discussed with the patient. All questions were answered. The patient expressed a clear understanding of the above. [address] [place] [address] in 6 weeks for repeat lab studies, 12 weeks for office visit.     Danielle Montanez PA-C  [facility] [address] [place] [address], [address] [address] [phone]

## 2017-08-29 NOTE — MR AVS SNAPSHOT
Visit Information Date & Time Provider Department Dept. Phone Encounter #  
 8/29/2017  8:00 AM Moody Wu, [address] [address] [facility] [id number] Follow-up Instructions Return in about 3 months (around 11/29/2017) for Noah Flanagan. Your Appointments 11/29/2017 10:30 AM  
Follow Up with CHRISTOPH Vieyra [facility] [address] ([facility] Appt Note: Follow up [address] [facility] [place] [phone] [facility] [id number]  
[address] [facility] [address] Upcoming Health Maintenance Date Due Hepatitis C Screening 1950 DTaP/Tdap/Td series (1 - Tdap) 1/29/1972 BREAST CANCER SCRN MAMMOGRAM 1/29/2001 FOBT Q 1 YEAR AGE 50-75 1/29/2001 ZOSTER VACCINE AGE 60> 11/29/2010 GLAUCOMA SCREENING Q2Y 1/29/2016 OSTEOPOROSIS SCREENING (DEXA) 1/29/2016 Pneumococcal 65+ Low/Medium Risk (1 of 2 - PCV13) 1/29/2016 MEDICARE YEARLY EXAM 1/29/2016 INFLUENZA AGE 9 TO ADULT 8/1/2017 Allergies as of 8/29/2017  Review Complete On: 8/29/2017 By: Marcelo Painting No Known Allergies Current Immunizations  Never Reviewed No immunizations on file. Not reviewed this visit You Were Diagnosed With   
  
 Codes Comments Autoimmune hepatitis ([facility] [address]    -  Primary ICD-10-CM: K75.4 ICD-9-CM: 571.42 Vitals BP Pulse Temp Weight(growth percentile) SpO2 BMI  
 141/81 68 98.6 °F (37 °C) 131 lb 9.6 oz (59.7 kg) 99% 24.07 kg/m2 OB Status Smoking Status Postmenopausal Never Smoker Vitals History BMI and BSA Data Body Mass Index Body Surface Area 24.07 kg/m 2 1.62 m 2 Preferred Pharmacy Pharmacy Name Phone [id number], [address] [phone] Your Updated Medication List  
  
   
This list is accurate as of: 8/29/17  9:02 AM.  Always use your most recent med list.  
  
  
  
  
 azaTHIOprine 50 mg tablet Commonly known as:  IMURAN  
TAKE 1 TABLET BY MOUTH TWICE A DAY  
  
 diphenhydrAMINE 25 mg capsule Commonly known as:  BENADRYL Take 25 mg by mouth once. lisinopril 20 mg tablet Commonly known as:  Mechele Livings Take  by mouth daily. predniSONE 5 mg tablet Commonly known as:  Edrie Power Take 1 Tab by mouth daily. rosuvastatin 10 mg tablet Commonly known as:  CRESTOR Take 10 mg by mouth daily. tacrolimus 1 mg capsule Commonly known as:  PROGRAF  
TAKE 2 CAPS BY MOUTH TWO (2) TIMES A DAY. Follow-up Instructions Return in about 3 months (around 11/29/2017) for Nestor Mccloud. To-Do List   
 10/16/2017 Lab:  CBC W/O DIFF   
  
 10/16/2017 Lab:  HEPATIC FUNCTION PANEL (6)   
  
 10/16/2017 Lab:  METABOLIC PANEL, BASIC   
  
 10/16/2017 Lab:  TACROLIMUS, WHOLE BLOOD Introducing [facility] [place] [facility] SERVICES! Dear Jaylen Pod: Thank you for requesting a [employer] account. Our records indicate that you already have an active [employer] account. You can access your account anytime at [email or web address] Did you know that you can access your hospital and ER discharge instructions at any time in [employer]? You can also review all of your test results from your hospital stay or ER visit. Additional Information If you have questions, please visit the Frequently Asked Questions section of the [employer] website at [email or web address] Remember, [employer] is NOT to be used for urgent needs. For medical emergencies, dial 911. Now available from your iPhone and Android! Please provide this summary of care documentation to your next provider. Your primary care clinician is listed as Lalo Nash. If you have any questions after today's visit, please call [phone].

## 2017-10-03 RX ORDER — TACROLIMUS 1 MG/1
1 CAPSULE ORAL 2 TIMES DAILY
Qty: 60 CAP | Refills: 5 | Status: SHIPPED | OUTPATIENT
Start: 2017-10-03 | End: 2018-08-08 | Stop reason: SDUPTHER

## 2017-10-16 DIAGNOSIS — K75.4 AUTOIMMUNE HEPATITIS (HCC): ICD-10-CM

## 2017-10-27 LAB
ALBUMIN SERPL-MCNC: 4.7 G/DL (ref 3.6–4.8)
ALP SERPL-CCNC: 74 IU/L (ref 39–117)
ALT SERPL-CCNC: 23 IU/L (ref 0–32)
AST SERPL-CCNC: 30 IU/L (ref 0–40)
BILIRUB DIRECT SERPL-MCNC: 0.14 MG/DL (ref 0–0.4)
BILIRUB SERPL-MCNC: 0.5 MG/DL (ref 0–1.2)
BUN SERPL-MCNC: 18 MG/DL (ref 8–27)
BUN/CREAT SERPL: 21 (ref 12–28)
CALCIUM SERPL-MCNC: 9.7 MG/DL (ref 8.7–10.3)
CHLORIDE SERPL-SCNC: 99 MMOL/L (ref 96–106)
CO2 SERPL-SCNC: 26 MMOL/L (ref 18–29)
CREAT SERPL-MCNC: 0.85 MG/DL (ref 0.57–1)
ERYTHROCYTE [DISTWIDTH] IN BLOOD BY AUTOMATED COUNT: 13.7 % (ref 12.3–15.4)
GFR SERPLBLD CREATININE-BSD FMLA CKD-EPI: 72 ML/MIN/1.73
GFR SERPLBLD CREATININE-BSD FMLA CKD-EPI: 83 ML/MIN/1.73
GLUCOSE SERPL-MCNC: 101 MG/DL (ref 65–99)
HCT VFR BLD AUTO: 42.5 % (ref 34–46.6)
HGB BLD-MCNC: 14.4 G/DL (ref 11.1–15.9)
MCH RBC QN AUTO: 31.4 PG (ref 26.6–33)
MCHC RBC AUTO-ENTMCNC: 33.9 G/DL (ref 31.5–35.7)
MCV RBC AUTO: 93 FL (ref 79–97)
PLATELET # BLD AUTO: 198 X10E3/UL (ref 150–379)
POTASSIUM SERPL-SCNC: 5 MMOL/L (ref 3.5–5.2)
RBC # BLD AUTO: 4.59 X10E6/UL (ref 3.77–5.28)
SODIUM SERPL-SCNC: 141 MMOL/L (ref 134–144)
TACROLIMUS, 700249: 2.9 NG/ML (ref 2–20)
WBC # BLD AUTO: 3.9 X10E3/UL (ref 3.4–10.8)

## 2017-10-30 NOTE — PROGRESS NOTES
Pt notified of stable findings and plan to continue with present dosing of tac. Follow-up as scheduled.

## 2017-11-29 ENCOUNTER — OFFICE VISIT (OUTPATIENT)
Dept: HEMATOLOGY | Age: 67
End: 2017-11-29

## 2017-11-29 VITALS
OXYGEN SATURATION: 100 % | HEIGHT: 62 IN | WEIGHT: 130 LBS | SYSTOLIC BLOOD PRESSURE: 131 MMHG | TEMPERATURE: 97.8 F | HEART RATE: 63 BPM | BODY MASS INDEX: 23.92 KG/M2 | DIASTOLIC BLOOD PRESSURE: 72 MMHG

## 2017-11-29 DIAGNOSIS — K75.4 AUTOIMMUNE HEPATITIS (HCC): Primary | ICD-10-CM

## 2017-11-29 RX ORDER — AZATHIOPRINE 50 MG/1
50 TABLET ORAL DAILY
Qty: 30 TAB | Refills: 11 | Status: SHIPPED | OUTPATIENT
Start: 2017-11-29 | End: 2018-10-01 | Stop reason: ALTCHOICE

## 2017-11-29 NOTE — PROGRESS NOTES
[facility] MD María, FACP, Cite Arturo Garcia     April Oswaldo Rowley, RICKIE Powers MD, Radha Lin MD     [address] [place], NP     Sia Alvarez NP        [address] [facility], [address] [address]  22.     [phone]     FAX: 680 [facility], [address] [place] [address], [address]     [phone]     FAX: [phone]       Patient Care Team:  Sj Gupta MD as PCP - General (Family Practice)  Shirlyn Mohs, MD (Gastroenterology)      Problem List  Date Reviewed: 8/29/2017          Codes Class Noted    Autoimmune hepatitis [facility]) ICD-10-CM: K75.4  ICD-9-CM: 571.42  3/18/2016        Hypertension ICD-10-CM: I10  ICD-9-CM: 401.9  3/18/2016        Hypercholesterolemia ICD-10-CM: E78.00  ICD-9-CM: 272.0  3/18/2016              Gorge Drain [facility] for management of autoimmune Hepatitis. The active problem list, all pertinent past medical history, medications, liver histology, and laboratory findings related to the liver disorder were reviewed with the patient. The patient is a 78 y.o.  female who was first noted to have abnormalities in liver transaminases into the 1000 range in 12/2015. Serologic evaluation for markers of chronic liver disease were positive for KATLYN and ASMA. Ultrasound of the liver was performed in 7/2015. The results of the imaging demonstrated a normal appearing liver. A liver biopsy was performed in 11/2015. The biopsy slides were reviewed by Dr. Coleen Stanton and demonstrated a severe active hepatitis with bridging necrosis and fibrosis. The patient had been taking Lipitor. This was stopped when the liver enzymes were noted to be elevated. The patient relates the timing of timing of the elevation with when she was switched from Crestor to Lipitor.  The liver enzymes remained elevated following discontinuation of statin. She has restarted treatment for cholesterol with Crestor again and is doing well with this medication and has responded well with reduction in values. The patient initiated treatment with prednisone 30 mg every day and imuran 50 mg every day in 11/2015. The liver enzymes declined from 1000 to about 300-400 range and then stabilized. Since establishing with this office, we had increased the dose of the Imuran to 100 mg daily and decreased the prednisone to 20 mg daily. She had been on this dose for ~6 months and due to lack of response to therapy, we added tacrolimus to her regimen in 10/2017. Initially, she had been taking 1 mg bid instead of 2 mg bid as ordered, but increased to full 2 mg bid dosing in 11/2016. She is tolerating medications well without GI side effects. We have continued to decrease dosing of prednisone and she has now been off this medication since 6/2017. She has no new medication-related side effects at present and feels well in general.  Her current dosing for the past several months has been tacrolimus 1 mg bid and azathioprine 100 mg daily. The most recent laboratory studies indicate that the liver transaminases have normalized, ALP is normal, tests of hepatic synthetic and metabolic function are normal, and the platelet count is normal.  Her renal function and tacrolimus levels are appropriate. The patient has no symptoms which could be attributed to the liver disorder. The patient completes all daily activities without any functional limitations. The patient has not experienced fatigue, pain in the right side over the liver, problems concentrating, swelling of the abdomen, swelling of the lower extremities, hematemesis, hematochezia.       She has had a recent corneal abrasion and took quite some time to heal.  She has been using rewetting drops and is doing better in this regard but questions if her medications might contribute to these symptoms. ALLERGIES  No Known Allergies    MEDICATIONS  Current Outpatient Prescriptions   Medication Sig    azaTHIOprine (IMURAN) 50 mg tablet Take 1 Tab by mouth daily.  tacrolimus (PROGRAF) 1 mg capsule Take 1 Cap by mouth two (2) times a day.  rosuvastatin (CRESTOR) 10 mg tablet Take 10 mg by mouth daily.  lisinopril (PRINIVIL, ZESTRIL) 20 mg tablet Take  by mouth daily.  diphenhydrAMINE (BENADRYL) 25 mg capsule Take 25 mg by mouth once.  predniSONE (DELTASONE) 5 mg tablet Take 1 Tab by mouth daily. No current facility-administered medications for this visit. SYSTEM REVIEW NOT RELATED TO LIVER DISEASE OR REVIEWED ABOVE:  Constitution systems: Negative for fever, chills, weight gain, weight loss. Eyes: Negative for visual changes. Recent abrasions of cornea bilaterally. ENT: Negative for sore throat, painful swallowing. Respiratory: Negative for cough, hemoptysis, SOB. Cardiology: Negative for chest pain, palpitations. GI:  Negative for constipation or diarrhea. : Negative for urinary frequency, dysuria, hematuria, nocturia. Skin: Negative for rash. Hematology: Negative for easy bruising, blood clots. Musculo-skeletal: Negative for back pain, muscle pain, weakness. Neurologic: Negative for headaches, dizziness, vertigo, memory problems not related to HE. Psychology: Negative for anxiety, depression. FAMILY HISTORY:  The father  at age 80 years. The mother is alive and healthy at age [address] years. There is no family history of liver disease. There is no family history of immune disorders. SOCIAL HISTORY:  The patient is . The patient has 3 children, and 6 grandchildren. The patient has never used tobacco products. The patient consumes 1 alcoholic beverages per day historically. She discontinued alcohol with recognition of liver problem. The patient used to work in banking.   The patient retired in 2014.      PHYSICAL EXAMINATION:  Visit Vitals    /72 (BP 1 Location: Left arm, BP Patient Position: Sitting)    Pulse 63    Temp 97.8 °F (36.6 °C) (Tympanic)    Ht 5' 2\" (1.575 m)    Wt 130 lb (59 kg)    SpO2 100%    BMI 23.78 kg/m2     General: No acute distress. Eyes: Sclera anicteric. ENT: No oral lesions. Thyroid normal.  Nodes: No adenopathy. Skin: No spider angiomata. No jaundice. No palmar erythema. Respiratory: Lungs clear to auscultation. Cardiovascular: Regular heart rate. No murmurs. No JVD. Abdomen: Soft non-tender. Liver size normal to percussion/palpation. Spleen not palpable. No obvious ascites. Extremities: No edema. No muscle wasting. No gross arthritic changes. Neurologic: Alert and oriented. Cranial nerves grossly intact. No asterixis.     LABORATORY STUDIES:  [place] [place] of [address] Units 10/26/2017 8/8/2017 6/1/2017 4/6/2017 1/26/2017 12/12/2016 11/14/2016   WBC 3.4 - 10.8 x10E3/uL 3.9 3.7 7.6 5.1 8.7 9.2 8.9   ANC 1.4 - 7.0 x10E3/uL - - - - - - -   HGB 11.1 - 15.9 g/dL 14.4 13.9 14.4 14.4 14.1 14.4 15.2    - 379 x10E3/uL 198 195 210 194 198 219 202   AST 0 - 40 IU/L 30 28 36 35 45(H) 40 43(H)   ALT 0 - 32 IU/L 23 23 28 30 38(H) 35(H) 43(H)   Alk Phos 39 - 117 IU/L 74 62 56 50 51 51 53   Bili, Total 0.0 - 1.2 mg/dL 0.5 0.5 0.4 0.3 0.7 0.3 0.5   Bili, Direct 0.00 - 0.40 mg/dL 0.14 0.13 0.11 0.10 0.18 0.09 0.14   Albumin 3.6 - 4.8 g/dL 4.7 4.6 4.8 4.4 4.6 4.5 4.7   BUN 8 - 27 mg/dL 18 20 19 20 18 23 15   Creat 0.57 - 1.00 mg/dL 0.85 0.97 0.91 0.84 0.87 0.91 0.83   Na 134 - 144 mmol/L 141 139 138 141 139 142 141   K 3.5 - 5.2 mmol/L 5.0 5.1 4.8 4.5 4.7 4.6 4.4   Cl 96 - 106 mmol/L 99 98 98 99 98 100 100   CO2 18 - 29 mmol/L 26 24 21 22 23 22 23   Glucose 65 - 99 mg/dL 101(H) 103(H) 127(H) 96 120(H) 143(H) 132(H)       SEROLOGIES:  Serologies Latest Ref Rn 3/18/2016   Hep A Ab, Total Negative Positive (A)   Hep B Surface Ag Negative Negative   KATLYN, IFA  See patterns   KATLYN Pattern  1:1280 (H)   C-ANCA Neg:<1:20 titer <1:20   P-ANCA Neg:<1:20 titer <1:20   ANCA Neg:<1:20 titer <1:20   ASMCA 0 - 19 Units 71 (H)   M2 Ab 0.0 - 20.0 Units 11.4   Anti-SLA 0.0 - 20.0 units 1.0   LKM 0.0 - 20.0 Units 1.4   10/2015. Anti-HCV negative, KATLYN negative, ASMA positive    LIVER HISTOLOGY:  11/2015. Slides reviewed by MLS. Severe active hepatitis with PMN, bridging necrosis and bridging fibrosis. 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 She has been on this combination of doses for the past roughly 6 months without prednisone and recent labs have shown normal enzymes. I have discussed with her reducing oses of medication to the lowest that maintain her normal enzymes. Will plan to maintain the tacrolimus dosing at 1 mg bid and reduce azathioprine dose to 50 mg daily. Will have her follow-up for repeat lab testing and tac level in 6-8 weeks for assessment of stability. Will perform laboratory testing to monitor liver function and degree of liver injury every 6-12 weeks. This will include BMP, hepatic panel, CBC with platelet count and tac level. No additional labs drawn in the office today. Serologic and virologic studies to assess for other causes of chronic liver disease were negative. Hyperlipidemia. Patient reports excellent tolerance and response to restart of Crestor. Will continue this at the low dose of 10 mg. Hypertension. Pressures have been improved recently and she continues to monitor on a regular basis. Dry eyes, abrasions. It is possible that the azathioprine could be contributing to dry eye symptoms.   She will continue her present regimen of drops and we will see if reduction in dosing of azathioprine helps in these symptoms. The patient was directed to continue all current medications at the current dosages. There are no contraindications for the patient to take any medications that are necessary for treatment of other medical issues. The patient was counseled regarding alcohol consumption. She was advised to continue with moderation of alcohol until we get the process under control. The need for vaccination against viral hepatitis B will be assessed with serologic and instituted as appropriate. She is immune to hepatitis A. All of the above issues were discussed with the patient. All questions were answered. The patient expressed a clear understanding of the above. [facility] 87 in 4 months, repeat lab studies in 6-8 weeks.     Caleb Bennett PA-C  L[facility] [place] of [address] [place] [address], [address] [facility], [facility] 22.  [phone]

## 2017-11-29 NOTE — MR AVS SNAPSHOT
Visit Information Date & Time Provider Department Dept. Phone Encounter #  
 11/29/2017 10:30 AM Charlie Leija, [address] of [place] [address] [id number] Upcoming Health Maintenance Date Due Hepatitis C Screening 1950 DTaP/Tdap/Td series (1 - Tdap) 1/29/1972 BREAST CANCER SCRN MAMMOGRAM 1/29/2001 FOBT Q 1 YEAR AGE 50-75 1/29/2001 ZOSTER VACCINE AGE 60> 11/29/2010 GLAUCOMA SCREENING Q2Y 1/29/2016 OSTEOPOROSIS SCREENING (DEXA) 1/29/2016 Pneumococcal 65+ Low/Medium Risk (1 of 2 - PCV13) 1/29/2016 MEDICARE YEARLY EXAM 1/29/2016 Influenza Age 5 to Adult 8/1/2017 Allergies as of 11/29/2017  Review Complete On: 11/29/2017 By: Anushka Rose LPN No Known Allergies Current Immunizations  Never Reviewed No immunizations on file. Not reviewed this visit You Were Diagnosed With   
  
 Codes Comments Autoimmune hepatitis ([facility]    -  Primary ICD-10-CM: K75.4 ICD-9-CM: 571.42 Vitals BP Pulse Temp Height(growth percentile) Weight(growth percentile) 131/72 (BP 1 Location: Left arm, BP Patient Position: Sitting) 63 97.8 °F (36.6 °C) (Tympanic) 5' 2\" (1.575 m) 130 lb (59 kg) SpO2 BMI OB Status Smoking Status 100% 23.78 kg/m2 Postmenopausal Never Smoker BMI and BSA Data Body Mass Index Body Surface Area 23.78 kg/m 2 1.61 m 2 Preferred Pharmacy Pharmacy Name Phone [id number] [phone] Your Updated Medication List  
  
   
This list is accurate as of: 11/29/17 11:10 AM.  Always use your most recent med list.  
  
  
  
  
 azaTHIOprine 50 mg tablet Commonly known as:  The Pepsi Take 1 Tab by mouth daily. diphenhydrAMINE 25 mg capsule Commonly known as:  BENADRYL Take 25 mg by mouth once. lisinopril 20 mg tablet Commonly known as:  Unique Shames Take  by mouth daily. predniSONE 5 mg tablet Commonly known as:  Reino Matthew Take 1 Tab by mouth daily. rosuvastatin 10 mg tablet Commonly known as:  CRESTOR Take 10 mg by mouth daily. tacrolimus 1 mg capsule Commonly known as:  PROGRAF Take 1 Cap by mouth two (2) times a day. Prescriptions Sent to Pharmacy Refills  
 azaTHIOprine (IMURAN) 50 mg tablet 11 Sig: Take 1 Tab by mouth daily. Class: Normal  
 Pharmacy: [id number]- [place], [address] Ph #: [phone] Route: Oral  
  
To-Do List   
 01/29/2018 Lab:  CBC W/O DIFF   
  
 01/29/2018 Lab:  METABOLIC PANEL, COMPREHENSIVE   
  
 01/29/2018 Lab:  TACROLIMUS, WHOLE BLOOD Introducing [facility] SERVICES! Dear Jaswinder Santana: Thank you for requesting a [employer] account. Our records indicate that you already have an active FusionOps account. You can access your account anytime at [email or web address] Did you know that you can access your hospital and ER discharge instructions at any time in FusionOps? You can also review all of your test results from your hospital stay or ER visit. Additional Information If you have questions, please visit the Frequently Asked Questions section of the FusionOps website at [email or web address] Remember, FusionOps is NOT to be used for urgent needs. For medical emergencies, dial 911. Now available from your iPhone and Android! Please provide this summary of care documentation to your next provider. Your primary care clinician is listed as Dulce Polk. If you have any questions after today's visit, please call [phone].

## 2018-01-29 DIAGNOSIS — K75.4 AUTOIMMUNE HEPATITIS (HCC): ICD-10-CM

## 2018-02-03 LAB
ALBUMIN SERPL-MCNC: 4.7 G/DL (ref 3.6–4.8)
ALBUMIN/GLOB SERPL: 2 {RATIO} (ref 1.2–2.2)
ALP SERPL-CCNC: 74 IU/L (ref 39–117)
ALT SERPL-CCNC: 19 IU/L (ref 0–32)
AST SERPL-CCNC: 24 IU/L (ref 0–40)
BILIRUB SERPL-MCNC: 0.5 MG/DL (ref 0–1.2)
BUN SERPL-MCNC: 18 MG/DL (ref 8–27)
BUN/CREAT SERPL: 23 (ref 12–28)
CALCIUM SERPL-MCNC: 9.5 MG/DL (ref 8.7–10.3)
CHLORIDE SERPL-SCNC: 103 MMOL/L (ref 96–106)
CO2 SERPL-SCNC: 23 MMOL/L (ref 18–29)
CREAT SERPL-MCNC: 0.8 MG/DL (ref 0.57–1)
ERYTHROCYTE [DISTWIDTH] IN BLOOD BY AUTOMATED COUNT: 13.7 % (ref 12.3–15.4)
GFR SERPLBLD CREATININE-BSD FMLA CKD-EPI: 77 ML/MIN/1.73
GFR SERPLBLD CREATININE-BSD FMLA CKD-EPI: 88 ML/MIN/1.73
GLOBULIN SER CALC-MCNC: 2.4 G/DL (ref 1.5–4.5)
GLUCOSE SERPL-MCNC: 98 MG/DL (ref 65–99)
HCT VFR BLD AUTO: 41.5 % (ref 34–46.6)
HGB BLD-MCNC: 13.8 G/DL (ref 11.1–15.9)
MCH RBC QN AUTO: 31.9 PG (ref 26.6–33)
MCHC RBC AUTO-ENTMCNC: 33.3 G/DL (ref 31.5–35.7)
MCV RBC AUTO: 96 FL (ref 79–97)
PLATELET # BLD AUTO: 169 X10E3/UL (ref 150–379)
POTASSIUM SERPL-SCNC: 4.5 MMOL/L (ref 3.5–5.2)
PROT SERPL-MCNC: 7.1 G/DL (ref 6–8.5)
RBC # BLD AUTO: 4.33 X10E6/UL (ref 3.77–5.28)
SODIUM SERPL-SCNC: 142 MMOL/L (ref 134–144)
WBC # BLD AUTO: 3.6 X10E3/UL (ref 3.4–10.8)

## 2018-02-04 LAB — TACROLIMUS, 700249: 3.1 NG/ML (ref 2–20)

## 2018-02-05 NOTE — PROGRESS NOTES
Pt notified of results, plan to hold at present dosing Aza 50 mg daily and tac 1 mg bid. Follow-up as scheduled in 2 months.

## 2018-04-16 DIAGNOSIS — K75.4 AUTOIMMUNE HEPATITIS (HCC): Primary | ICD-10-CM

## 2018-04-25 ENCOUNTER — OFFICE VISIT (OUTPATIENT)
Dept: HEMATOLOGY | Age: 68
End: 2018-04-25

## 2018-04-25 VITALS
SYSTOLIC BLOOD PRESSURE: 130 MMHG | DIASTOLIC BLOOD PRESSURE: 72 MMHG | TEMPERATURE: 98.6 F | BODY MASS INDEX: 24.25 KG/M2 | HEART RATE: 83 BPM | HEIGHT: 62 IN | WEIGHT: 131.8 LBS

## 2018-04-25 DIAGNOSIS — K75.4 AUTOIMMUNE HEPATITIS (HCC): Primary | ICD-10-CM

## 2018-04-25 DIAGNOSIS — R93.3 ABNORMAL FINDINGS ON DIAGNOSTIC IMAGING OF OTHER PARTS OF DIGESTIVE TRACT: ICD-10-CM

## 2018-04-25 DIAGNOSIS — E78.00 HYPERCHOLESTEROLEMIA: ICD-10-CM

## 2018-04-25 NOTE — MR AVS SNAPSHOT
2700 [facility] [phone] [address] [place] [address] 
[phone] Patient: Carlee Conner MRN: [id number] KQM:[id number] Visit Information Date & Time Provider Department Dept. Phone Encounter #  
 4/25/2018  9:30 AM Moody Wu, [address] [place] [address] [id number] Upcoming Health Maintenance Date Due Hepatitis C Screening 1950 DTaP/Tdap/Td series (1 - Tdap) 1/29/1972 BREAST CANCER SCRN MAMMOGRAM 1/29/2001 FOBT Q 1 YEAR AGE 50-75 1/29/2001 ZOSTER VACCINE AGE 60> 11/29/2010 GLAUCOMA SCREENING Q2Y 1/29/2016 Bone Densitometry (Dexa) Screening 1/29/2016 Pneumococcal 65+ Low/Medium Risk (1 of 2 - PCV13) 1/29/2016 Influenza Age 5 to Adult 8/1/2017 MEDICARE YEARLY EXAM 3/14/2018 Allergies as of 4/25/2018  Review Complete On: 4/25/2018 By: Yoandy Perkins No Known Allergies Current Immunizations  Never Reviewed No immunizations on file. Not reviewed this visit You Were Diagnosed With   
  
 Codes Comments Autoimmune hepatitis ([facility]    -  Primary ICD-10-CM: K75.4 ICD-9-CM: 571.42 Hypercholesterolemia     ICD-10-CM: E78.00 ICD-9-CM: 272.0 Abnormal findings on diagnostic imaging of other parts of digestive tract     ICD-10-CM: R93.3 ICD-9-CM: 793.4 Vitals BP Pulse Temp Height(growth percentile) Weight(growth percentile) BMI  
 130/72 (BP 1 Location: Right arm, BP Patient Position: Sitting) 83 98.6 °F (37 °C) (Tympanic) 5' 2\" (1.575 m) 131 lb 12.8 oz (59.8 kg) 24.11 kg/m2 OB Status Smoking Status Postmenopausal Never Smoker BMI and BSA Data Body Mass Index Body Surface Area  
 24.11 kg/m 2 1.62 m 2 Preferred Pharmacy Pharmacy Name Phone [id number] [address] [phone] Your Updated Medication List  
  
   
 This list is accurate as of 4/25/18  9:58 AM.  Always use your most recent med list.  
  
  
  
  
 azaTHIOprine 50 mg tablet Commonly known as:  The Pepsi Take 1 Tab by mouth daily. diphenhydrAMINE 25 mg capsule Commonly known as:  BENADRYL Take 25 mg by mouth once. lisinopril 20 mg tablet Commonly known as:  Theodore Littler Take  by mouth daily. rosuvastatin 10 mg tablet Commonly known as:  CRESTOR Take 10 mg by mouth daily. tacrolimus 1 mg capsule Commonly known as:  PROGRAF Take 1 Cap by mouth two (2) times a day. We Performed the Following CBC W/O DIFF [40928 CPT(R)] HEPATIC FUNCTION PANEL (6) [[id number] Custom] LIPID PANEL [57910 CPT(R)] METABOLIC PANEL, BASIC [43900 CPT(R)] TACROLIMUS, WHOLE BLOOD [35652 CPT(R)] Introducing [facility] SERVICES! Dear Kirsten Lowry: Thank you for requesting a PayEase account. Our records indicate that you already have an active PayEase account. You can access your account anytime at [email or web address] Did you know that you can access your hospital and ER discharge instructions at any time in PayEase? You can also review all of your test results from your hospital stay or ER visit. Additional Information If you have questions, please visit the Frequently Asked Questions section of the PayEase website at [email or web address] Remember, PayEase is NOT to be used for urgent needs. For medical emergencies, dial 911. Now available from your iPhone and Android! Please provide this summary of care documentation to your next provider. Your primary care clinician is listed as [place]. If you have any questions after today's visit, please call [phone].

## 2018-04-25 NOTE — PROGRESS NOTES
[facility] MD María, FACP, Cite Arturo Garcia     April Farhana Ha, RICKIE Connell MD, Salem Sandhoff, MD     [address] [place], MARY ANN Hunter NP        [address] [facility], [address] [address]  22.     [phone]     FAX: 9[address] [facility], [address], [address]     [phone]     FAX: [phone]       Patient Care Team:  Tomasa Madison MD as PCP - General (Family Practice)  Luis Jensen MD (Gastroenterology)      Problem List  Date Reviewed: 11/29/2017          Codes Class Noted    Autoimmune hepatitis [facility]) ICD-10-CM: K75.4  ICD-9-CM: 571.42  3/18/2016        Hypertension ICD-10-CM: I10  ICD-9-CM: 401.9  3/18/2016        Hypercholesterolemia ICD-10-CM: E78.00  ICD-9-CM: 272.0  3/18/2016              Soco Thomas [facility] to the [facility] [place] for management of autoimmune hepatitis. The active problem list, all pertinent past medical history, medications, liver histology, and laboratory findings related to the liver disorder were reviewed with the patient. The patient is a 80 y.o.  female who was first noted to have abnormalities in liver transaminases into the 1000 range in 12/2015 at an outside GI office. Serologic evaluation for markers of chronic liver disease were positive for KATLYN and ASMA. Ultrasound of the liver was performed in 7/2015. The results of the imaging demonstrated a normal appearing liver. A liver biopsy was performed in 11/2015. The biopsy slides were reviewed by Dr. Anabelle Hudson and demonstrated a severe active hepatitis with bridging necrosis and fibrosis. The patient had been taking Lipitor at the time of enzyme elevation. This was stopped when the liver enzymes were noted to be elevated.   The patient relates the timing of the elevation with when she was switched from Crestor to Lipitor. The liver enzymes remained elevated following discontinuation of statin prompting investigation of other sources, leading to the autoimmune diagnosis. She has since restarted treatment for cholesterol with Crestor again and is doing well with this medication and has responded well with reduction in values. With recognition of autoimmune hepatitis, the patient initiated treatment with prednisone 30 mg every day and imuran 50 mg every day in 11/2015. The liver enzymes declined from 1000 to about 300-400 range and then stabilized. Since establishing with this office, we had increased the dose of the Imuran to 100 mg daily and decreased the prednisone to 20 mg daily. She had been on this dose for ~6 months and due to lack of response to therapy, we added tacrolimus to her regimen in 10/2017. She is tolerating medications well without GI side effects. We have continued to decrease dosing of prednisone and she has now been off this medication since 6/2017. She has no new medication-related side effects at present and feels well in general.  Her current dosing for the past several 6 months has been tacrolimus 1 mg bid and azathioprine 50 mg daily. The most recent laboratory studies indicate that the liver transaminases have normalized, ALP is normal, tests of hepatic synthetic and metabolic function are normal, and the platelet count is normal.  Her renal function and tacrolimus levels are appropriate. The patient has no symptoms which could be attributed to the liver disorder. The patient completes all daily activities without any functional limitations. The patient has not experienced fatigue, pain in the right side over the liver, problems concentrating, swelling of the abdomen, swelling of the lower extremities, hematemesis, hematochezia.       She has had a recent corneal abrasion and took quite some time to heal.  She has been using rewetting drops and is doing better in this regard but questions if her medications might contribute to these symptoms. She is continuing to follow with ophthalmology. ALLERGIES  No Known Allergies    MEDICATIONS  Current Outpatient Prescriptions   Medication Sig    azaTHIOprine (IMURAN) 50 mg tablet Take 1 Tab by mouth daily.  tacrolimus (PROGRAF) 1 mg capsule Take 1 Cap by mouth two (2) times a day.  rosuvastatin (CRESTOR) 10 mg tablet Take 10 mg by mouth daily.  predniSONE (DELTASONE) 5 mg tablet Take 1 Tab by mouth daily.  lisinopril (PRINIVIL, ZESTRIL) 20 mg tablet Take  by mouth daily.  diphenhydrAMINE (BENADRYL) 25 mg capsule Take 25 mg by mouth once. No current facility-administered medications for this visit. SYSTEM REVIEW NOT RELATED TO LIVER DISEASE OR REVIEWED ABOVE:  Constitution systems: Negative for fever, chills, weight gain, weight loss. Eyes: Negative for visual changes. Recent abrasions of cornea bilaterally. ENT: Negative for sore throat, painful swallowing. Respiratory: Negative for cough, hemoptysis, SOB. Cardiology: Negative for chest pain, palpitations. GI:  Negative for constipation or diarrhea. : Negative for urinary frequency, dysuria, hematuria, nocturia. Skin: Negative for rash. Hematology: Negative for easy bruising, blood clots. Musculo-skeletal: Negative for back pain, muscle pain, weakness. Neurologic: Negative for headaches, dizziness, vertigo, memory problems not related to HE. Psychology: Negative for anxiety, depression. FAMILY HISTORY:  The father  at age 80 years. The mother is alive and healthy at age 80 years. There is no family history of liver disease. There is no family history of immune disorders. SOCIAL HISTORY:  The patient is . The patient has 3 children, and 6 grandchildren. The patient has never used tobacco products. The patient consumes 1 alcoholic beverages per day historically.   She discontinued alcohol with recognition of liver problem. The patient used to work in banking. The patient retired in 2014. PHYSICAL EXAMINATION:  Visit Vitals    /72 (BP 1 Location: Right arm, BP Patient Position: Sitting)    Pulse 83    Temp 98.6 °F (37 °C) (Tympanic)    Ht 5' 2\" (1.575 m)    Wt 131 lb 12.8 oz (59.8 kg)    BMI 24.11 kg/m2     General: No acute distress. Eyes: Sclera anicteric. ENT: No oral lesions. Thyroid normal.  Nodes: No adenopathy. Skin: No spider angiomata. No jaundice. No palmar erythema. Respiratory: Lungs clear to auscultation. Cardiovascular: Regular heart rate. No murmurs. No JVD. Abdomen: Soft non-tender. Liver edge firm and easily palpable 2-3 cm BCM. Spleen not palpable. No obvious ascites. Extremities: No edema. No muscle wasting. No gross arthritic changes. Neurologic: Alert and oriented. Cranial nerves grossly intact. No asterixis.     LABORATORY STUDIES:  Liver [place] of [address] Units 2/2/2018 10/26/2017 8/8/2017 6/1/2017 4/6/2017 1/26/2017 12/12/2016   WBC 3.4 - 10.8 x10E3/uL 3.6 3.9 3.7 7.6 5.1 8.7 9.2   ANC 1.4 - 7.0 x10E3/uL - - - - - - -   HGB 11.1 - 15.9 g/dL 13.8 14.4 13.9 14.4 14.4 14.1 14.4    - 379 x10E3/uL 169 198 195 210 194 198 219   AST 0 - 40 IU/L 24 30 28 36 35 45(H) 40   ALT 0 - 32 IU/L 19 23 23 28 30 38(H) 35(H)   Alk Phos 39 - 117 IU/L 74 74 62 56 50 51 51   Bili, Total 0.0 - 1.2 mg/dL 0.5 0.5 0.5 0.4 0.3 0.7 0.3   Bili, Direct 0.00 - 0.40 mg/dL - 0.14 0.13 0.11 0.10 0.18 0.09   Albumin 3.6 - 4.8 g/dL 4.7 4.7 4.6 4.8 4.4 4.6 4.5   BUN 8 - 27 mg/dL 18 18 20 19 20 18 23   Creat 0.57 - 1.00 mg/dL 0.80 0.85 0.97 0.91 0.84 0.87 0.91   Na 134 - 144 mmol/L 142 141 139 138 141 139 142   K 3.5 - 5.2 mmol/L 4.5 5.0 5.1 4.8 4.5 4.7 4.6   Cl 96 - 106 mmol/L 103 99 98 98 99 98 100   CO2 18 - 29 mmol/L 23 26 24 21 22 23 22   Glucose 65 - 99 mg/dL 98 101(H) 103(H) 127(H) 96 120(H) 143(H)   Additional lab values drawn at today's office visit are pending at the time of documentation. SEROLOGIES:  Serologies Latest Ref Rng 3/18/2016   Hep A Ab, Total Negative Positive (A)   Hep B Surface Ag Negative Negative   KATLYN, IFA  See patterns   KATLYN Pattern  1:1280 (H)   C-ANCA Neg:<1:20 titer <1:20   P-ANCA Neg:<1:20 titer <1:20   ANCA Neg:<1:20 titer <1:20   ASMCA 0 - 19 Units 71 (H)   M2 Ab 0.0 - 20.0 Units 11.4   Anti-SLA 0.0 - 20.0 units 1.0   LKM 0.0 - 20.0 Units 1.4   10/2015. Anti-HCV negative, KATLYN negative, ASMA positive    LIVER HISTOLOGY:  11/2015. Slides reviewed by MLS. Severe active hepatitis with PMN, bridging necrosis and bridging fibrosis. 4/2018. [facility]. EkPa was 10.9. Suggested fibrosis level is F3. CAP score is 324, suggestive of steatosis. 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 Today's in-office fibroscan is consistent with past biopsy findings. The patient is currently receiving treatment with Imuran 50 mg every day, and tacrolimus 1 mg bid. She has been on this combination of doses for the past roughly 6 months without prednisone for the past year and recent labs have shown normal enzymes. I have discussed with her reducing doses of medication to the lowest that maintain her normal enzymes. Will plan to maintain the tacrolimus dosing at 1 mg bid and rif enzymes remain normal, will discontinue further use of azathioprine. Will have her follow-up for repeat lab testing and tac level in 6-8 weeks for assessment of stability. Will perform laboratory testing to monitor liver function and degree of liver injury every 6-12 weeks. This will include BMP, hepatic panel, CBC with platelet count and tac level.   No additional labs drawn in the office today.    Fibroscan performed as a means of non-invasive baseline estimate of fibrosis. This can be used in lieu of biopsy to monitor future progression. Serologic and virologic studies to assess for other causes of chronic liver disease were negative. Hyperlipidemia. Patient reports excellent tolerance and response to restart of Crestor. Will continue this at the low dose of 10 mg.  I have repeated lipid panel in the office and will forward to PCP for review. Hypertension. Pressures have been improved recently and she continues to monitor on a regular basis. Dry eyes, abrasions. It is possible that the azathioprine could be contributing to dry eye symptoms. She will continue her present regimen of drops and we will see if reduction in dosing of azathioprine helps in these symptoms. Will see if suspension of this drug helps her symptoms and continue follow-up with ophthalmology. The patient was directed to continue all current medications at the current dosages. There are no contraindications for the patient to take any medications that are necessary for treatment of other medical issues. The patient was counseled regarding alcohol consumption. She was advised to continue with moderation of alcohol until we get the process under control. The need for vaccination against viral hepatitis B will be assessed with serologic and instituted as appropriate. She is immune to hepatitis A. All of the above issues were discussed with the patient. All questions were answered. The patient expressed a clear understanding of the above. [facility] 87 in 4 months, repeat lab studies in 6-8 weeks.     Cisco Tapia PA-C  Liver [place] of [address] [place] [address], [address] [address]  [place]  [address].  [phone]

## 2018-04-26 LAB
ALBUMIN SERPL-MCNC: 5.1 G/DL (ref 3.6–4.8)
ALP SERPL-CCNC: 71 IU/L (ref 39–117)
ALT SERPL-CCNC: 19 IU/L (ref 0–32)
AST SERPL-CCNC: 26 IU/L (ref 0–40)
BILIRUB DIRECT SERPL-MCNC: 0.14 MG/DL (ref 0–0.4)
BILIRUB SERPL-MCNC: 0.5 MG/DL (ref 0–1.2)
BUN SERPL-MCNC: 19 MG/DL (ref 8–27)
BUN/CREAT SERPL: 26 (ref 12–28)
CALCIUM SERPL-MCNC: 10.1 MG/DL (ref 8.7–10.3)
CHLORIDE SERPL-SCNC: 99 MMOL/L (ref 96–106)
CHOLEST SERPL-MCNC: 169 MG/DL (ref 100–199)
CO2 SERPL-SCNC: 25 MMOL/L (ref 18–29)
CREAT SERPL-MCNC: 0.73 MG/DL (ref 0.57–1)
ERYTHROCYTE [DISTWIDTH] IN BLOOD BY AUTOMATED COUNT: 13.6 % (ref 12.3–15.4)
GFR SERPLBLD CREATININE-BSD FMLA CKD-EPI: 85 ML/MIN/1.73
GFR SERPLBLD CREATININE-BSD FMLA CKD-EPI: 99 ML/MIN/1.73
GLUCOSE SERPL-MCNC: 97 MG/DL (ref 65–99)
HCT VFR BLD AUTO: 42.2 % (ref 34–46.6)
HDLC SERPL-MCNC: 76 MG/DL
HGB BLD-MCNC: 14.2 G/DL (ref 11.1–15.9)
LDLC SERPL CALC-MCNC: 82 MG/DL (ref 0–99)
MCH RBC QN AUTO: 31.7 PG (ref 26.6–33)
MCHC RBC AUTO-ENTMCNC: 33.6 G/DL (ref 31.5–35.7)
MCV RBC AUTO: 94 FL (ref 79–97)
PLATELET # BLD AUTO: 183 X10E3/UL (ref 150–379)
POTASSIUM SERPL-SCNC: 4.8 MMOL/L (ref 3.5–5.2)
RBC # BLD AUTO: 4.48 X10E6/UL (ref 3.77–5.28)
SODIUM SERPL-SCNC: 141 MMOL/L (ref 134–144)
TACROLIMUS, 700249: 3.1 NG/ML (ref 2–20)
TRIGL SERPL-MCNC: 57 MG/DL (ref 0–149)
VLDLC SERPL CALC-MCNC: 11 MG/DL (ref 5–40)
WBC # BLD AUTO: 5.1 X10E3/UL (ref 3.4–10.8)

## 2018-04-27 DIAGNOSIS — K75.4 AUTOIMMUNE HEPATITIS (HCC): Primary | ICD-10-CM

## 2018-04-27 NOTE — PROGRESS NOTES
Msg sent to pt to discontinue administration of azathioprine and to continue on Prograf 1 mg bid only. Will have her return to the office for labs only ~6/11/2018.

## 2018-06-11 DIAGNOSIS — K75.4 AUTOIMMUNE HEPATITIS (HCC): ICD-10-CM

## 2018-06-30 LAB
ALBUMIN SERPL-MCNC: 4.9 G/DL (ref 3.6–4.8)
ALBUMIN/GLOB SERPL: 2.1 {RATIO} (ref 1.2–2.2)
ALP SERPL-CCNC: 66 IU/L (ref 39–117)
ALT SERPL-CCNC: 26 IU/L (ref 0–32)
AST SERPL-CCNC: 27 IU/L (ref 0–40)
BILIRUB SERPL-MCNC: 0.4 MG/DL (ref 0–1.2)
BUN SERPL-MCNC: 20 MG/DL (ref 8–27)
BUN/CREAT SERPL: 23 (ref 12–28)
CALCIUM SERPL-MCNC: 9.8 MG/DL (ref 8.7–10.3)
CHLORIDE SERPL-SCNC: 103 MMOL/L (ref 96–106)
CO2 SERPL-SCNC: 20 MMOL/L (ref 20–29)
CREAT SERPL-MCNC: 0.86 MG/DL (ref 0.57–1)
ERYTHROCYTE [DISTWIDTH] IN BLOOD BY AUTOMATED COUNT: 13.1 % (ref 12.3–15.4)
GLOBULIN SER CALC-MCNC: 2.3 G/DL (ref 1.5–4.5)
GLUCOSE SERPL-MCNC: 98 MG/DL (ref 65–99)
HCT VFR BLD AUTO: 42.4 % (ref 34–46.6)
HGB BLD-MCNC: 14.3 G/DL (ref 11.1–15.9)
MCH RBC QN AUTO: 31.6 PG (ref 26.6–33)
MCHC RBC AUTO-ENTMCNC: 33.7 G/DL (ref 31.5–35.7)
MCV RBC AUTO: 94 FL (ref 79–97)
PLATELET # BLD AUTO: 189 X10E3/UL (ref 150–379)
POTASSIUM SERPL-SCNC: 4.7 MMOL/L (ref 3.5–5.2)
PROT SERPL-MCNC: 7.2 G/DL (ref 6–8.5)
RBC # BLD AUTO: 4.52 X10E6/UL (ref 3.77–5.28)
SODIUM SERPL-SCNC: 142 MMOL/L (ref 134–144)
WBC # BLD AUTO: 3.7 X10E3/UL (ref 3.4–10.8)

## 2018-07-01 LAB — TACROLIMUS, 700249: 2.6 NG/ML (ref 2–20)

## 2018-07-03 NOTE — PROGRESS NOTES
Pt notified of stable lab findings since discontinuation of azathioprine. Continue with present dosing of tacrolimus 1 mg bid and follow-up in 9/2018 as scheduled.

## 2018-08-08 RX ORDER — TACROLIMUS 1 MG/1
1 CAPSULE ORAL 2 TIMES DAILY
Qty: 60 CAP | Refills: 5 | Status: SHIPPED | OUTPATIENT
Start: 2018-08-08 | End: 2018-11-15 | Stop reason: SDUPTHER

## 2018-08-08 NOTE — TELEPHONE ENCOUNTER
This is a Jillian HAWTHORNE patient, she is OOO this week. Are you able to fill the attached for her? Requested Prescriptions     Pending Prescriptions Disp Refills    tacrolimus (PROGRAF) 1 mg capsule 60 Cap 5     Sig: Take 1 Cap by mouth two (2) times a day.

## 2018-09-29 LAB
ALBUMIN SERPL-MCNC: 4.6 G/DL (ref 3.6–4.8)
ALBUMIN/GLOB SERPL: 1.9 {RATIO} (ref 1.2–2.2)
ALP SERPL-CCNC: 61 IU/L (ref 39–117)
ALT SERPL-CCNC: 21 IU/L (ref 0–32)
AST SERPL-CCNC: 25 IU/L (ref 0–40)
BILIRUB SERPL-MCNC: 0.4 MG/DL (ref 0–1.2)
BUN SERPL-MCNC: 15 MG/DL (ref 8–27)
BUN/CREAT SERPL: 19 (ref 12–28)
CALCIUM SERPL-MCNC: 9.8 MG/DL (ref 8.7–10.3)
CHLORIDE SERPL-SCNC: 105 MMOL/L (ref 96–106)
CO2 SERPL-SCNC: 24 MMOL/L (ref 20–29)
CREAT SERPL-MCNC: 0.8 MG/DL (ref 0.57–1)
ERYTHROCYTE [DISTWIDTH] IN BLOOD BY AUTOMATED COUNT: 12.8 % (ref 12.3–15.4)
GLOBULIN SER CALC-MCNC: 2.4 G/DL (ref 1.5–4.5)
GLUCOSE SERPL-MCNC: 96 MG/DL (ref 65–99)
HCT VFR BLD AUTO: 42.5 % (ref 34–46.6)
HGB BLD-MCNC: 13.9 G/DL (ref 11.1–15.9)
MCH RBC QN AUTO: 31.7 PG (ref 26.6–33)
MCHC RBC AUTO-ENTMCNC: 32.7 G/DL (ref 31.5–35.7)
MCV RBC AUTO: 97 FL (ref 79–97)
PLATELET # BLD AUTO: 167 X10E3/UL (ref 150–379)
POTASSIUM SERPL-SCNC: 5 MMOL/L (ref 3.5–5.2)
PROT SERPL-MCNC: 7 G/DL (ref 6–8.5)
RBC # BLD AUTO: 4.39 X10E6/UL (ref 3.77–5.28)
SODIUM SERPL-SCNC: 145 MMOL/L (ref 134–144)
WBC # BLD AUTO: 4.6 X10E3/UL (ref 3.4–10.8)

## 2018-09-30 LAB — TACROLIMUS, 700249: 3.7 NG/ML (ref 2–20)

## 2018-10-01 ENCOUNTER — OFFICE VISIT (OUTPATIENT)
Dept: HEMATOLOGY | Age: 68
End: 2018-10-01

## 2018-10-01 VITALS
DIASTOLIC BLOOD PRESSURE: 78 MMHG | HEART RATE: 68 BPM | SYSTOLIC BLOOD PRESSURE: 130 MMHG | WEIGHT: 131.8 LBS | OXYGEN SATURATION: 99 % | BODY MASS INDEX: 24.11 KG/M2 | TEMPERATURE: 97 F

## 2018-10-01 DIAGNOSIS — K75.4 AUTOIMMUNE HEPATITIS (HCC): Primary | ICD-10-CM

## 2018-10-01 NOTE — PROGRESS NOTES
1. Have you been to the ER, urgent care clinic since your last visit? Hospitalized since your last visit? No    2. Have you seen or consulted any other health care providers outside of the [address] [place] [address] since your last visit? Include any pap smears or colon screening. No   Chief Complaint   Patient presents with    Follow-up     Visit Vitals    /78 (BP 1 Location: Left arm, BP Patient Position: Sitting)    Pulse 68    Temp 97 °F (36.1 °C) (Tympanic)    Wt 131 lb 12.8 oz (59.8 kg)    SpO2 99%    BMI 24.11 kg/m2     PHQ over the last two weeks 10/1/2018   Little interest or pleasure in doing things Not at all   Feeling down, depressed, irritable, or hopeless Not at all   Total Score PHQ 2 0     Learning Assessment 10/1/2018   PRIMARY LEARNER Patient   BARRIERS PRIMARY LEARNER NONE   CO-LEARNER CAREGIVER No   PRIMARY LANGUAGE ENGLISH   LEARNER PREFERENCE PRIMARY LISTENING   ANSWERED BY patient   RELATIONSHIP SELF     Abuse Screening Questionnaire 10/1/2018   Do you ever feel afraid of your partner? N   Are you in a relationship with someone who physically or mentally threatens you? N   Is it safe for you to go home? Y     Fall Risk Assessment, last 12 mths 10/1/2018   Able to walk? Yes   Fall in past 12 months?  No

## 2018-10-01 NOTE — MR AVS SNAPSHOT
[address] [facility] [phone] [address] [place] [address] 
[phone] Patient: Saima Richards MRN: [id number] YID:[id number] Visit Information Date & Time Provider Department Dept. Phone Encounter #  
 10/1/2018 10:30 AM Gladis Almaguer [address] [address] [id number] Follow-up Instructions Return in about 6 months (around 4/1/2019) for Nadia Noonan. Upcoming Health Maintenance Date Due Hepatitis C Screening 1950 DTaP/Tdap/Td series (1 - Tdap) 1/29/1972 Shingrix Vaccine Age 50> (1 of 2) 1/29/2001 BREAST CANCER SCRN MAMMOGRAM 1/29/2001 FOBT Q 1 YEAR AGE 50-75 1/29/2001 GLAUCOMA SCREENING Q2Y 1/29/2016 Bone Densitometry (Dexa) Screening 1/29/2016 Pneumococcal 65+ Low/Medium Risk (1 of 2 - PCV13) 1/29/2016 MEDICARE YEARLY EXAM 3/14/2018 Influenza Age 5 to Adult 8/1/2018 Allergies as of 10/1/2018  Review Complete On: 10/1/2018 By: Nayeli Kenney No Known Allergies Current Immunizations  Never Reviewed No immunizations on file. Not reviewed this visit Vitals BP Pulse Temp Weight(growth percentile) SpO2 BMI  
 130/78 (BP 1 Location: Left arm, BP Patient Position: Sitting) 68 97 °F (36.1 °C) (Tympanic) 131 lb 12.8 oz (59.8 kg) 99% 24.11 kg/m2 OB Status Smoking Status Postmenopausal Never Smoker BMI and BSA Data Body Mass Index Body Surface Area  
 24.11 kg/m 2 1.62 m 2 Preferred Pharmacy Pharmacy Name Phone [id number] [phone] Your Updated Medication List  
  
   
This list is accurate as of 10/1/18 11:08 AM.  Always use your most recent med list.  
  
  
  
  
 diphenhydrAMINE 25 mg capsule Commonly known as:  BENADRYL Take 25 mg by mouth once. lisinopril 20 mg tablet Commonly known as:  Cathyann Alexandra Take  by mouth daily. rosuvastatin 10 mg tablet Commonly known as:  CRESTOR Take 10 mg by mouth daily. tacrolimus 1 mg capsule Commonly known as:  PROGRAF Take 1 Cap by mouth two (2) times a day. Follow-up Instructions Return in about 6 months (around 4/1/2019) for Piyush Brown. Introducing [facility] SERVICES! Dear Julius Roberts: Thank you for requesting a [employer] account. Our records indicate that you already have an active [employer] account. You can access your account anytime at [email or web address] Did you know that you can access your hospital and ER discharge instructions at any time in [employer]? You can also review all of your test results from your hospital stay or ER visit. Additional Information If you have questions, please visit the Frequently Asked Questions section of the [employer] website at [email or web address] Remember, [employer] is NOT to be used for urgent needs. For medical emergencies, dial 911. Now available from your iPhone and Android! Please provide this summary of care documentation to your next provider. Your primary care clinician is listed as Ruby Mazariegos. If you have any questions after today's visit, please call [phone].

## 2018-10-01 NOTE — PROGRESS NOTES
93 Jcarlos Daniel MD, FACP, Cite Arturo Garcia     April Presley Roy, RICKIE Kim MD, Navi Niño MD     [address] [place], MARY ANN Escobar, MARY ANN        [address] [facility], [address] [address]     [phone]     FAX: 411 [address], [address] [facility]     [phone]     FAX: [phone]       Patient Care Team:  Lexi Washington MD as PCP - General (Family Practice)  Angelika Coronado MD (Gastroenterology)      Problem List  Date Reviewed: 4/25/2018          Codes Class Noted    Autoimmune hepatitis [facility]) ICD-10-CM: K75.4  ICD-9-CM: 571.42  3/18/2016        Hypertension ICD-10-CM: I10  ICD-9-CM: 401.9  3/18/2016        Hypercholesterolemia ICD-10-CM: E78.00  ICD-9-CM: 272.0  3/18/2016              Gilson Mejia returns to the [facility] for management of autoimmune hepatitis. The active problem list, all pertinent past medical history, medications, liver histology, and laboratory findings related to the liver disorder were reviewed with the patient. The patient is a 80 y.o.  female who was first noted to have abnormalities in liver transaminases into the 1000 range in 12/2015 at an outside GI office. Serologic evaluation for markers of chronic liver disease were positive for KATLYN and ASMA. Ultrasound of the liver was performed in 7/2015. The results of the imaging demonstrated a normal appearing liver. A liver biopsy was performed in 11/2015. The biopsy slides were reviewed by Dr. Adithya Rose and demonstrated a severe active hepatitis with bridging necrosis and fibrosis. The patient had been taking Lipitor at the time of enzyme elevation. This was stopped when the liver enzymes were noted to be elevated.   The patient relates the timing of the elevation with when she was switched from Crestor to Lipitor. The liver enzymes remained elevated following discontinuation of statin prompting investigation of other sources, leading to the autoimmune diagnosis. She has since restarted treatment for cholesterol with Crestor again and is doing well with this medication and has responded well with reduction in values. With recognition of autoimmune hepatitis, the patient initiated treatment with prednisone 30 mg every day and imuran 50 mg every day in 11/2015. The liver enzymes declined from 1000 to about 300-400 range and then stabilized. Since establishing with this office, we had increased the dose of the Imuran to 100 mg daily and decreased the prednisone to 20 mg daily. She had been on this dose for ~6 months and due to lack of response to therapy, we added tacrolimus to her regimen in 10/2017. She is tolerating medications well without GI side effects. We have continued to decrease dosing of prednisone and she has now been off this medication since 6/2017. We then withdrew use of azathioprine in 4/2018. She has no new medication-related side effects at present and feels well in general.  Her current dosing for the past several 5 months has been tacrolimus 1 mg bid only. The most recent laboratory studies indicate that the liver transaminases have normalized, ALP is normal, tests of hepatic synthetic and metabolic function are normal, and the platelet count is normal.  Her renal function and tacrolimus levels are appropriate. The patient has no symptoms which could be attributed to the liver disorder. The patient completes all daily activities without any functional limitations. The patient has not experienced fatigue, pain in the right side over the liver, problems concentrating, swelling of the abdomen, swelling of the lower extremities, hematemesis, hematochezia.         ALLERGIES  No Known Allergies    MEDICATIONS  Current Outpatient Prescriptions   Medication Sig    tacrolimus (PROGRAF) 1 mg capsule Take 1 Cap by mouth two (2) times a day.  rosuvastatin (CRESTOR) 10 mg tablet Take 10 mg by mouth daily.  lisinopril (PRINIVIL, ZESTRIL) 20 mg tablet Take  by mouth daily.  diphenhydrAMINE (BENADRYL) 25 mg capsule Take 25 mg by mouth once.  azaTHIOprine (IMURAN) 50 mg tablet Take 1 Tab by mouth daily. No current facility-administered medications for this visit. SYSTEM REVIEW NOT RELATED TO LIVER DISEASE OR REVIEWED ABOVE:  Constitution systems: Negative for fever, chills, weight gain, weight loss. Eyes: Negative for visual changes. Recent abrasions of cornea bilaterally. ENT: Negative for sore throat, painful swallowing. Respiratory: Negative for cough, hemoptysis, SOB. Cardiology: Negative for chest pain, palpitations. GI:  Negative for constipation or diarrhea. : Negative for urinary frequency, dysuria, hematuria, nocturia. Skin: Negative for rash. Hematology: Negative for easy bruising, blood clots. Musculo-skeletal: Negative for back pain, muscle pain, weakness. Neurologic: Negative for headaches, dizziness, vertigo, memory problems not related to HE. Psychology: Negative for anxiety, depression. FAMILY HISTORY:  The father  at age 80 years. The mother is alive and healthy at age 80 years. There is no family history of liver disease. There is no family history of immune disorders. SOCIAL HISTORY:  The patient is . The patient has 3 children, and 6 grandchildren. The patient has never used tobacco products. The patient consumes 1 alcoholic beverages per day historically. She discontinued alcohol with recognition of liver problem. The patient used to work in banking. The patient retired in .       PHYSICAL EXAMINATION:  Visit Vitals    /78 (BP 1 Location: Left arm, BP Patient Position: Sitting)    Pulse 68    Temp 97 °F (36.1 °C) (Tympanic)    Wt 131 lb 12.8 oz (59.8 kg)    SpO2 99%    BMI 24.11 kg/m2     General: No acute distress. Eyes: Sclera anicteric. ENT: No oral lesions. Thyroid normal.  Nodes: No adenopathy. Skin: No spider angiomata. No jaundice. No palmar erythema. Respiratory: Lungs clear to auscultation. Cardiovascular: Regular heart rate. No murmurs. No JVD. Abdomen: Soft non-tender. Liver edge firm and easily palpable 2-3 cm BCM. Spleen not palpable. No obvious ascites. Extremities: No edema. No muscle wasting. No gross arthritic changes. Neurologic: Alert and oriented. Cranial nerves grossly intact. No asterixis. LABORATORY STUDIES:  Liver [place] of [id number] Units 9/28/2018 6/28/2018 4/25/2018 2/2/2018 10/26/2017 8/8/2017 6/1/2017   WBC 3.4 - 10.8 x10E3/uL 4.6 3.7 5.1 3.6 3.9 3.7 7.6   ANC 1.4 - 7.0 x10E3/uL - - - - - - -   HGB 11.1 - 15.9 g/dL 13.9 14.3 14.2 13.8 14.4 13.9 14.4    - 379 x10E3/uL 167 189 183 169 198 195 210   AST 0 - 40 IU/L 25 27 26 24 30 28 36   ALT 0 - 32 IU/L 21 26 19 19 23 23 28   Alk Phos 39 - 117 IU/L 61 66 71 74 74 62 56   Bili, Total 0.0 - 1.2 mg/dL 0.4 0.4 0.5 0.5 0.5 0.5 0.4   Bili, Direct 0.00 - 0.40 mg/dL - - 0.14 - 0.14 0.13 0.11   Albumin 3.6 - 4.8 g/dL 4.6 4. 9(H) 5. 1(H) 4.7 4.7 4.6 4.8   BUN 8 - 27 mg/dL 15 20 19 18 18 20 19   Creat 0.57 - 1.00 mg/dL 0.80 0.86 0.73 0.80 0.85 0.97 0.91   Na 134 - 144 mmol/L 145(H) 142 141 142 141 139 138   K 3.5 - 5.2 mmol/L 5.0 4.7 4.8 4.5 5.0 5.1 4.8   Cl 96 - 106 mmol/L 105 103 99 103 99 98 98   CO2 20 - 29 mmol/L 24 20 25 23 26 24 21   Glucose 65 - 99 mg/dL 96 98 97 98 101(H) 103(H) 127(H)       SEROLOGIES:  Serologies Latest Ref Rng 3/18/2016   Hep A Ab, Total Negative Positive (A)   Hep B Surface Ag Negative Negative   KATLYN, IFA  See patterns   KATLYN Pattern  1:1280 (H)   C-ANCA Neg:<1:20 titer <1:20   P-ANCA Neg:<1:20 titer <1:20   ANCA Neg:<1:20 titer <1:20   ASMCA 0 - 19 Units 71 (H)   M2 Ab 0.0 - 20.0 Units 11.4   Anti-SLA 0.0 - 20.0 units 1.0   LKM 0.0 - 20.0 Units 1.4   10/2015. Anti-HCV negative, KATLYN negative, ASMA positive    LIVER HISTOLOGY:  11/2015. Slides reviewed by MLS. Severe active hepatitis with PMN, bridging necrosis and bridging fibrosis. 4/2018. FibroScan performed at [address]. EkPa was 10.9. Suggested fibrosis level is F3. CAP score is 324, suggestive of steatosis. 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 In-office fibroscan is consistent with past biopsy findings. The patient is currently receiving treatment with tacrolimus 1 mg bid as a monotherapy for the past 5 months. Recent labs have shown normal enzymes, appropriate tac levels and normal renal function. I have discussed with her reducing doses of medication to the lowest that maintain her normal enzymes. Will plan to maintain the tacrolimus dosing at 1 mg bid as a long-term course. Will have her follow-up for repeat lab testing and tac level in 3 months for assessment of stability. Will perform laboratory testing to monitor liver function and degree of liver injury every 12 weeks. This will include BMP, hepatic panel, CBC with platelet count and tac level. No additional labs drawn in the office today. Fibroscan performed as a means of non-invasive baseline estimate of fibrosis. This can be used in lieu of biopsy to monitor future progression. Serologic and virologic studies to assess for other causes of chronic liver disease were negative. Hyperlipidemia. Patient reports excellent tolerance and response to restart of Crestor. Will continue this at the low dose of 10 mg. Past  repeat lipid panel at her last office visit in 4/2018 was within normal limits. Hypertension.  Pressures have been improved recently and she continues to monitor on a regular basis. The patient was directed to continue all current medications at the current dosages. There are no contraindications for the patient to take any medications that are necessary for treatment of other medical issues. The patient was counseled regarding alcohol consumption. She was advised to continue with moderation of alcohol until we get the process under control. The need for vaccination against viral hepatitis B will be assessed with serologic and instituted as appropriate. She is immune to hepatitis A. All of the above issues were discussed with the patient. All questions were answered. The patient expressed a clear understanding of the above. [address] [address] in 6 months, repeat lab studies in 3 months.     Florentin Brown PA-C  Liver [place] of [address] [place] [address], [address] [address].  [phone]

## 2018-11-15 NOTE — TELEPHONE ENCOUNTER
Requested Prescriptions     Pending Prescriptions Disp Refills    tacrolimus (PROGRAF) 1 mg capsule 60 Cap 5     Sig: Take 1 Cap by mouth two (2) times a day.

## 2018-11-20 RX ORDER — TACROLIMUS 1 MG/1
1 CAPSULE ORAL 2 TIMES DAILY
Qty: 60 CAP | Refills: 5 | Status: SHIPPED | OUTPATIENT
Start: 2018-11-20 | End: 2018-11-27 | Stop reason: SDUPTHER

## 2018-11-27 RX ORDER — TACROLIMUS 1 MG/1
1 CAPSULE ORAL 2 TIMES DAILY
Qty: 60 CAP | Refills: 5 | Status: SHIPPED | OUTPATIENT
Start: 2018-11-27 | End: 2019-01-07 | Stop reason: SDUPTHER

## 2019-01-01 DIAGNOSIS — K75.4 AUTOIMMUNE HEPATITIS (HCC): ICD-10-CM

## 2019-01-04 LAB
ALBUMIN SERPL-MCNC: 4.9 G/DL (ref 3.6–4.8)
ALP SERPL-CCNC: 57 IU/L (ref 39–117)
ALT SERPL-CCNC: 19 IU/L (ref 0–32)
AST SERPL-CCNC: 20 IU/L (ref 0–40)
BILIRUB DIRECT SERPL-MCNC: 0.12 MG/DL (ref 0–0.4)
BILIRUB SERPL-MCNC: 0.3 MG/DL (ref 0–1.2)
BUN SERPL-MCNC: 18 MG/DL (ref 8–27)
BUN/CREAT SERPL: 24 (ref 12–28)
CALCIUM SERPL-MCNC: 9.8 MG/DL (ref 8.7–10.3)
CHLORIDE SERPL-SCNC: 104 MMOL/L (ref 96–106)
CO2 SERPL-SCNC: 22 MMOL/L (ref 20–29)
CREAT SERPL-MCNC: 0.74 MG/DL (ref 0.57–1)
ERYTHROCYTE [DISTWIDTH] IN BLOOD BY AUTOMATED COUNT: 13 % (ref 12.3–15.4)
GLUCOSE SERPL-MCNC: 80 MG/DL (ref 65–99)
HCT VFR BLD AUTO: 40.6 % (ref 34–46.6)
HGB BLD-MCNC: 13.5 G/DL (ref 11.1–15.9)
MCH RBC QN AUTO: 31.1 PG (ref 26.6–33)
MCHC RBC AUTO-ENTMCNC: 33.3 G/DL (ref 31.5–35.7)
MCV RBC AUTO: 94 FL (ref 79–97)
PLATELET # BLD AUTO: 208 X10E3/UL (ref 150–379)
POTASSIUM SERPL-SCNC: 4.2 MMOL/L (ref 3.5–5.2)
RBC # BLD AUTO: 4.34 X10E6/UL (ref 3.77–5.28)
SODIUM SERPL-SCNC: 142 MMOL/L (ref 134–144)
WBC # BLD AUTO: 6.2 X10E3/UL (ref 3.4–10.8)

## 2019-01-05 LAB — TACROLIMUS, 700249: 3.7 NG/ML (ref 2–20)

## 2019-01-09 RX ORDER — TACROLIMUS 1 MG/1
1 CAPSULE ORAL 2 TIMES DAILY
Qty: 60 CAP | Refills: 5 | Status: SHIPPED | OUTPATIENT
Start: 2019-01-09 | End: 2019-11-19 | Stop reason: SDUPTHER

## 2019-04-09 DIAGNOSIS — K75.4 AUTOIMMUNE HEPATITIS (HCC): Primary | ICD-10-CM

## 2019-04-10 LAB
ALBUMIN SERPL-MCNC: 4.9 G/DL (ref 3.6–4.8)
ALP SERPL-CCNC: 57 IU/L (ref 39–117)
ALT SERPL-CCNC: 20 IU/L (ref 0–32)
AST SERPL-CCNC: 23 IU/L (ref 0–40)
BILIRUB DIRECT SERPL-MCNC: 0.16 MG/DL (ref 0–0.4)
BILIRUB SERPL-MCNC: 0.5 MG/DL (ref 0–1.2)
BUN SERPL-MCNC: 16 MG/DL (ref 8–27)
BUN/CREAT SERPL: 17 (ref 12–28)
CALCIUM SERPL-MCNC: 10 MG/DL (ref 8.7–10.3)
CHLORIDE SERPL-SCNC: 103 MMOL/L (ref 96–106)
CO2 SERPL-SCNC: 23 MMOL/L (ref 20–29)
CREAT SERPL-MCNC: 0.92 MG/DL (ref 0.57–1)
ERYTHROCYTE [DISTWIDTH] IN BLOOD BY AUTOMATED COUNT: 13.7 % (ref 12.3–15.4)
GLUCOSE SERPL-MCNC: 98 MG/DL (ref 65–99)
HCT VFR BLD AUTO: 44.7 % (ref 34–46.6)
HGB BLD-MCNC: 15 G/DL (ref 11.1–15.9)
MCH RBC QN AUTO: 31.6 PG (ref 26.6–33)
MCHC RBC AUTO-ENTMCNC: 33.6 G/DL (ref 31.5–35.7)
MCV RBC AUTO: 94 FL (ref 79–97)
PLATELET # BLD AUTO: 192 X10E3/UL (ref 150–379)
POTASSIUM SERPL-SCNC: 4.9 MMOL/L (ref 3.5–5.2)
PROT SERPL-MCNC: 7.2 G/DL (ref 6–8.5)
RBC # BLD AUTO: 4.74 X10E6/UL (ref 3.77–5.28)
SODIUM SERPL-SCNC: 141 MMOL/L (ref 134–144)
TACROLIMUS, 700249: 3.2 NG/ML (ref 2–20)
WBC # BLD AUTO: 4.6 X10E3/UL (ref 3.4–10.8)

## 2019-04-17 ENCOUNTER — OFFICE VISIT (OUTPATIENT)
Dept: HEMATOLOGY | Age: 69
End: 2019-04-17

## 2019-04-17 VITALS
BODY MASS INDEX: 24.33 KG/M2 | HEIGHT: 62 IN | SYSTOLIC BLOOD PRESSURE: 127 MMHG | WEIGHT: 132.2 LBS | OXYGEN SATURATION: 98 % | HEART RATE: 83 BPM | DIASTOLIC BLOOD PRESSURE: 69 MMHG | TEMPERATURE: 98.9 F

## 2019-04-17 DIAGNOSIS — K75.4 AUTOIMMUNE HEPATITIS (HCC): Primary | ICD-10-CM

## 2019-04-17 NOTE — PROGRESS NOTES
1. Have you been to the ER, urgent care clinic since your last visit? Hospitalized since your last visit? No    2. Have you seen or consulted any other health care providers outside of the [address] [place] [address] since your last visit? Include any pap smears or colon screening. No     Chief Complaint   Patient presents with    Follow-up     Visit Vitals  /69 (BP 1 Location: Left arm, BP Patient Position: Sitting)   Pulse 83   Temp 98.9 °F (37.2 °C) (Tympanic)   Ht 5' 2\" (1.575 m)   Wt 132 lb 3.2 oz (60 kg)   SpO2 98%   BMI 24.18 kg/m²     3 most recent PHQ Screens 4/17/2019   Little interest or pleasure in doing things Not at all   Feeling down, depressed, irritable, or hopeless Not at all   Total Score PHQ 2 0     Fall Risk Assessment, last 12 mths 4/17/2019   Able to walk? Yes   Fall in past 12 months? No       Learning Assessment 4/17/2019   PRIMARY LEARNER Patient   BARRIERS PRIMARY LEARNER NONE   CO-LEARNER CAREGIVER No   PRIMARY LANGUAGE ENGLISH   LEARNER PREFERENCE PRIMARY READING   ANSWERED BY patient   RELATIONSHIP SELF     Abuse Screening Questionnaire 4/17/2019   Do you ever feel afraid of your partner? N   Are you in a relationship with someone who physically or mentally threatens you? N   Is it safe for you to go home?  Helena Pickard

## 2019-04-17 NOTE — PROGRESS NOTES
[address] [facility] MD María, FACP, Cite Arturo Garcia     April Colletta Planas, MARY ANN Loza, RICKIE Lucero MD, [address], Sammy Knox MD     [address] [place], MARY ANN Tapia NP        [address]     [address] [place] [address], [address] [address]  [address].     [phone]     FAX: [address] [place] [address], [address], [address]     [phone]     FAX: [phone]       Patient Care Team:  Lisa Castro MD as PCP - General (Family Practice)  Priscila Harp MD (Gastroenterology)      Problem List  Date Reviewed: 4/17/2019          Codes Class Noted    Autoimmune hepatitis [facility]) ICD-10-CM: K75.4  ICD-9-CM: 571.42  3/18/2016        Hypertension ICD-10-CM: I10  ICD-9-CM: 401.9  3/18/2016        Hypercholesterolemia ICD-10-CM: E78.00  ICD-9-CM: 272.0  3/18/2016              Shari Milton returns to the [facility] for management of autoimmune hepatitis. The active problem list, all pertinent past medical history, medications, liver histology, and laboratory findings related to the liver disorder were reviewed with the patient. The patient is a 77 y.o.  female who was first noted to have abnormalities in liver transaminases into the 1000 range in 12/2015 at an outside GI office. Serologic evaluation for markers of chronic liver disease were positive for KATLYN and ASMA. Ultrasound of the liver was performed in 7/2015. The results of the imaging demonstrated a normal appearing liver. A liver biopsy was performed in 11/2015. The biopsy slides were reviewed by Dr. Kari Gardner and demonstrated a severe active hepatitis with bridging necrosis and fibrosis. The patient had been taking Lipitor at the time of enzyme elevation. This was stopped when the liver enzymes were noted to be elevated.   The patient relates the timing of the elevation with when she was switched from Crestor to Lipitor. The liver enzymes remained elevated following discontinuation of statin prompting investigation of other sources, leading to the autoimmune diagnosis. She has since restarted treatment for cholesterol with Crestor again and is doing well with this medication and has responded well with reduction in values. With recognition of autoimmune hepatitis, the patient initiated treatment with prednisone 30 mg every day and imuran 50 mg every day in 11/2015. The liver enzymes declined from 1000 to about 300-400 range and then stabilized. Since establishing with this office, we had increased the dose of the Imuran to 100 mg daily and decreased the prednisone to 20 mg daily. She had been on this dose for ~6 months and due to lack of response to therapy, we added tacrolimus as a third agent to her regimen in 10/2017. We have continued to decrease dosing of prednisone and she has now been off this medication since 6/2017. We then withdrew use of azathioprine in 4/2018. She has no new medication-related side effects at present and feels well in general.  Her current dosing since 5/2018 has been tacrolimus 1 mg bid only. The most recent laboratory studies indicate that the liver transaminases have normalized, ALP is normal, tests of hepatic synthetic and metabolic function are normal, and the platelet count is normal.  Her renal function and tacrolimus levels are appropriate. The patient has no symptoms which could be attributed to the liver disorder. The patient completes all daily activities without any functional limitations. The patient has not experienced fatigue, pain in the right side over the liver, problems concentrating, swelling of the abdomen, swelling of the lower extremities, hematemesis, hematochezia.         ALLERGIES  No Known Allergies    MEDICATIONS  Current Outpatient Medications   Medication Sig    tacrolimus (PROGRAF) 1 mg capsule Take 1 Cap by mouth two (2) times a day.  rosuvastatin (CRESTOR) 10 mg tablet Take 10 mg by mouth daily.  lisinopril (PRINIVIL, ZESTRIL) 20 mg tablet Take  by mouth daily.  diphenhydrAMINE (BENADRYL) 25 mg capsule Take 25 mg by mouth once. No current facility-administered medications for this visit. SYSTEM REVIEW NOT RELATED TO LIVER DISEASE OR REVIEWED ABOVE:  Constitution systems: Negative for fever, chills, weight gain, weight loss. Eyes: Negative for visual changes. Recent abrasions of cornea bilaterally. ENT: Negative for sore throat, painful swallowing. Respiratory: Negative for cough, hemoptysis, SOB. Cardiology: Negative for chest pain, palpitations. GI:  Negative for constipation or diarrhea. : Negative for urinary frequency, dysuria, hematuria, nocturia. Skin: Negative for rash. Hematology: Negative for easy bruising, blood clots. Musculo-skeletal: Negative for back pain, muscle pain, weakness. Neurologic: Negative for headaches, dizziness, vertigo, memory problems not related to HE. Psychology: Negative for anxiety, depression. FAMILY HISTORY:  The father  at age 80 years. The mother is alive and healthy at age 80 years. There is no family history of liver disease. There is no family history of immune disorders. SOCIAL HISTORY:  The patient is . The patient has 3 children, and 6 grandchildren. The patient has never used tobacco products. The patient consumes 1 alcoholic beverages per day historically. She discontinued alcohol with recognition of liver problem. The patient used to work in banking. The patient retired in . PHYSICAL EXAMINATION:  Visit Vitals  /69 (BP 1 Location: Left arm, BP Patient Position: Sitting)   Pulse 83   Temp 98.9 °F (37.2 °C) (Tympanic)   Ht 5' 2\" (1.575 m)   Wt 132 lb 3.2 oz (60 kg)   SpO2 98%   BMI 24.18 kg/m²     General: No acute distress. Eyes: Sclera anicteric. ENT: No oral lesions.   Thyroid normal.  Nodes: No adenopathy. Skin: No spider angiomata. No jaundice. No palmar erythema. Respiratory: Lungs clear to auscultation. Cardiovascular: Regular heart rate. No murmurs. No JVD. Abdomen: Soft non-tender. Liver edge firm and easily palpable 2-3 cm BCM. Spleen not palpable. No obvious ascites. Extremities: No edema. No muscle wasting. No gross arthritic changes. Neurologic: Alert and oriented. Cranial nerves grossly intact. No asterixis. LABORATORY STUDIES:  Liver [place] of [address] Units 4/9/2019 1/3/2019 9/28/2018 6/28/2018 4/25/2018 2/2/2018 10/26/2017   WBC 3.4 - 10.8 x10E3/uL 4.6 6.2 4.6 3.7 5.1 3.6 3.9   ANC 1.4 - 7.0 x10E3/uL - - - - - - -   HGB 11.1 - 15.9 g/dL 15.0 13.5 13.9 14.3 14.2 13.8 14.4    - 379 x10E3/uL 192 208 167 189 183 169 198   AST 0 - 40 IU/L 23 20 25 27 26 24 30   ALT 0 - 32 IU/L 20 19 21 26 19 19 23   Alk Phos 39 - 117 IU/L 57 57 61 66 71 74 74   Bili, Total 0.0 - 1.2 mg/dL 0.5 0.3 0.4 0.4 0.5 0.5 0.5   Bili, Direct 0.00 - 0.40 mg/dL 0.16 0.12 - - 0.14 - 0.14   Albumin 3.6 - 4.8 g/dL 4. 9(H) 4. 9(H) 4.6 4. 9(H) 5. 1(H) 4.7 4.7   BUN 8 - 27 mg/dL 16 18 15 20 19 18 18   Creat 0.57 - 1.00 mg/dL 0.92 0.74 0.80 0.86 0.73 0.80 0.85   Na 134 - 144 mmol/L 141 142 145(H) 142 141 142 141   K 3.5 - 5.2 mmol/L 4.9 4.2 5.0 4.7 4.8 4.5 5.0   Cl 96 - 106 mmol/L 103 104 105 103 99 103 99   CO2 20 - 29 mmol/L 23 22 24 20 25 23 26   Glucose 65 - 99 mg/dL 98 80 96 98 97 98 101(H)       SEROLOGIES:  Serologies Latest Ref Rng 3/18/2016   Hep A Ab, Total Negative Positive (A)   Hep B Surface Ag Negative Negative   KATLYN, IFA  See patterns   KATLYN Pattern  1:1280 (H)   C-ANCA Neg:<1:20 titer <1:20   P-ANCA Neg:<1:20 titer <1:20   ANCA Neg:<1:20 titer <1:20   ASMCA 0 - 19 Units 71 (H)   M2 Ab 0.0 - 20.0 Units 11.4   Anti-SLA 0.0 - 20.0 units 1.0   LKM 0.0 - 20.0 Units 1.4   10/2015. Anti-HCV negative, KATLYN negative, ASMA positive    LIVER HISTOLOGY:  11/2015. Slides reviewed by MLS. Severe active hepatitis with PMN, bridging necrosis and bridging fibrosis. 4/2018. FibroScan performed at [address]. EkPa was 10.9. Suggested fibrosis level is F3. CAP score is 324, suggestive of steatosis. 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 In-office fibroscan is consistent with past biopsy findings. The patient is currently receiving treatment with tacrolimus 1 mg bid as a monotherapy since 5/2018. Recent labs have shown normal enzymes, appropriate tac levels and normal renal function. I have discussed with her reducing doses of medication to the lowest that maintain her normal enzymes. Will plan to maintain the tacrolimus dosing at 1 mg bid as a long-term course. Will have her follow-up for repeat lab testing and tac level in 3 months for assessment of stability. Will perform laboratory testing to monitor liver function and degree of liver injury every 12 weeks. This will include BMP, hepatic panel, CBC with platelet count and tac level. No additional labs drawn in the office today. Fibroscan performed as a means of non-invasive baseline estimate of fibrosis. This can be used in lieu of biopsy to monitor future progression and we will plan repeat fibroscan at the time of the next office visit. Serologic and virologic studies to assess for other causes of chronic liver disease were negative. Hyperlipidemia. Patient reports excellent tolerance and response to restart of Crestor. Will continue this at the low dose of 10 mg. Hypertension. Pressures have been improved recently and she continues to monitor on a regular basis. The patient was directed to continue all current medications at the current dosages.   There are no contraindications for the patient to take any medications that are necessary for treatment of other medical issues. The patient was counseled regarding alcohol consumption. She was advised to continue with moderation of alcohol until we get the process under control. The need for vaccination against viral hepatitis B will be assessed with serologic and instituted as appropriate. She is immune to hepatitis A. All of the above issues were discussed with the patient. All questions were answered. The patient expressed a clear understanding of the above. [address] [address] in 6 months with repeat Fibroscan, repeat lab studies in 3 months.     Shannan Moy PA-C  Liver [place] of [address] [place] [address], [address] [address]  [address].  [phone]

## 2019-07-31 LAB
ERYTHROCYTE [DISTWIDTH] IN BLOOD BY AUTOMATED COUNT: 13.4 % (ref 12.3–15.4)
HCT VFR BLD AUTO: 41.6 % (ref 34–46.6)
HGB BLD-MCNC: 13.9 G/DL (ref 11.1–15.9)
MCH RBC QN AUTO: 31.8 PG (ref 26.6–33)
MCHC RBC AUTO-ENTMCNC: 33.4 G/DL (ref 31.5–35.7)
MCV RBC AUTO: 95 FL (ref 79–97)
PLATELET # BLD AUTO: 202 X10E3/UL (ref 150–450)
RBC # BLD AUTO: 4.37 X10E6/UL (ref 3.77–5.28)
WBC # BLD AUTO: 4.8 X10E3/UL (ref 3.4–10.8)

## 2019-08-01 DIAGNOSIS — K75.4 AUTOIMMUNE HEPATITIS (HCC): ICD-10-CM

## 2019-08-01 LAB
ALBUMIN SERPL-MCNC: 4.9 G/DL (ref 3.6–4.8)
ALP SERPL-CCNC: 48 IU/L (ref 39–117)
ALT SERPL-CCNC: 19 IU/L (ref 0–32)
AST SERPL-CCNC: 23 IU/L (ref 0–40)
BILIRUB DIRECT SERPL-MCNC: 0.12 MG/DL (ref 0–0.4)
BILIRUB SERPL-MCNC: 0.4 MG/DL (ref 0–1.2)
BUN SERPL-MCNC: 16 MG/DL (ref 8–27)
BUN/CREAT SERPL: 21 (ref 12–28)
CALCIUM SERPL-MCNC: 9.6 MG/DL (ref 8.7–10.3)
CHLORIDE SERPL-SCNC: 105 MMOL/L (ref 96–106)
CO2 SERPL-SCNC: 22 MMOL/L (ref 20–29)
CREAT SERPL-MCNC: 0.78 MG/DL (ref 0.57–1)
GLUCOSE SERPL-MCNC: 92 MG/DL (ref 65–99)
POTASSIUM SERPL-SCNC: 4.6 MMOL/L (ref 3.5–5.2)
SODIUM SERPL-SCNC: 143 MMOL/L (ref 134–144)
TACROLIMUS, 700249: 2.6 NG/ML (ref 2–20)

## 2019-08-05 NOTE — PROGRESS NOTES
Pt notified via [address] of stable lab values and good tac levels, remain on 1 mg bid and follow-up as scheduled.

## 2019-10-17 ENCOUNTER — OFFICE VISIT (OUTPATIENT)
Dept: HEMATOLOGY | Age: 69
End: 2019-10-17

## 2019-10-17 VITALS
OXYGEN SATURATION: 99 % | RESPIRATION RATE: 16 BRPM | WEIGHT: 134 LBS | BODY MASS INDEX: 24.66 KG/M2 | DIASTOLIC BLOOD PRESSURE: 82 MMHG | SYSTOLIC BLOOD PRESSURE: 133 MMHG | HEIGHT: 62 IN | HEART RATE: 83 BPM | TEMPERATURE: 98 F

## 2019-10-17 DIAGNOSIS — E55.9 VITAMIN D DEFICIENCY, UNSPECIFIED: ICD-10-CM

## 2019-10-17 DIAGNOSIS — R74.8 ELEVATED LIVER ENZYMES: ICD-10-CM

## 2019-10-17 DIAGNOSIS — K75.4 AUTOIMMUNE HEPATITIS (HCC): ICD-10-CM

## 2019-10-17 DIAGNOSIS — K75.4 AUTOIMMUNE HEPATITIS (HCC): Primary | ICD-10-CM

## 2019-10-17 NOTE — PROGRESS NOTES
[address] [place] [address], MD, Sharlene Desai MD Darron Bayley, RICKIE Culp, ACN-BC     April S Colten, [facility]   Neal Mckoy P-CARI Funk, [facility]       Waleska Will De [address]    at [address], [address] [place] [address], [address].    [phone]    FAX: [address] [place] [address], [address]    [phone]    FAX: [phone]       Patient Care Team:  Anamika Kaba MD as PCP - General (Family Practice)  Jose Lambert MD (Gastroenterology)      Problem List  Date Reviewed: 4/17/2019          Codes Class Noted    Autoimmune hepatitis [facility]) ICD-10-CM: K75.4  ICD-9-CM: 571.42  3/18/2016        Hypertension ICD-10-CM: I10  ICD-9-CM: 401.9  3/18/2016        Hypercholesterolemia ICD-10-CM: E78.00  ICD-9-CM: 272.0  3/18/2016              Rosalba Rodriguez returns to the [address] for management of autoimmune hepatitis. The active problem list, all pertinent past medical history, medications, liver histology, and laboratory findings related to the liver disorder were reviewed with the patient. The patient is a 77 y.o.  female who was first noted to have abnormalities in liver transaminases into the 1000 range in 12/2015 at an outside GI office. Serologic evaluation for markers of chronic liver disease were positive for KATLYN and ASMA. Ultrasound of the liver was performed in 7/2015. The results of the imaging demonstrated a normal appearing liver. A liver biopsy was performed in 11/2015. The biopsy slides were reviewed by Dr. Marleny Chakraborty and demonstrated a severe active hepatitis with bridging necrosis and fibrosis.     The patient had been taking Lipitor at the time of enzyme elevation. This was stopped when the liver enzymes were noted to be elevated. The patient relates the timing of the elevation with when she was switched from Crestor to Lipitor. The liver enzymes remained elevated following discontinuation of statin prompting investigation of other sources, leading to the autoimmune diagnosis. She has since restarted treatment for cholesterol with Crestor again and is doing well with this medication and has responded well with reduction in values. With recognition of autoimmune hepatitis, the patient initiated treatment with prednisone 30 mg every day and imuran 50 mg every day in 11/2015. The liver enzymes declined from 1000 to about 300-400 range and then stabilized. Since establishing with this office, we had increased the dose of the Imuran to 100 mg daily and decreased the prednisone to 20 mg daily. She had been on this dose for ~6 months and due to lack of response to therapy, we added tacrolimus as a third agent to her regimen in 10/2017. We have continued to decrease dosing of prednisone and she has now been off this medication since 6/2017. We then withdrew use of azathioprine in 4/2018. She has no new medication-related side effects at present and feels well in general.  Her current dosing since 5/2018 has been tacrolimus 1 mg bid only. The most recent laboratory studies indicate that the liver transaminases have normalized, ALP is normal, tests of hepatic synthetic and metabolic function are normal, and the platelet count is normal.  Her renal function and tacrolimus levels are appropriate. The patient has no symptoms which could be attributed to the liver disorder. The patient completes all daily activities without any functional limitations. The patient has not experienced fatigue, pain in the right side over the liver, problems concentrating, swelling of the abdomen, swelling of the lower extremities, hematemesis, hematochezia. ALLERGIES  No Known Allergies    MEDICATIONS  Current Outpatient Medications   Medication Sig    tacrolimus (PROGRAF) 1 mg capsule Take 1 Cap by mouth two (2) times a day.  rosuvastatin (CRESTOR) 10 mg tablet Take 10 mg by mouth daily.  lisinopril (PRINIVIL, ZESTRIL) 20 mg tablet Take  by mouth daily.  diphenhydrAMINE (BENADRYL) 25 mg capsule Take 25 mg by mouth once. No current facility-administered medications for this visit. SYSTEM REVIEW NOT RELATED TO LIVER DISEASE OR REVIEWED ABOVE:  Constitution systems: Negative for fever, chills, weight gain, weight loss. Eyes: Negative for visual changes. Recent abrasions of cornea bilaterally. ENT: Negative for sore throat, painful swallowing. Respiratory: Negative for cough, hemoptysis, SOB. Cardiology: Negative for chest pain, palpitations. GI:  Negative for constipation or diarrhea. : Negative for urinary frequency, dysuria, hematuria, nocturia. Skin: Negative for rash. Hematology: Negative for easy bruising, blood clots. Musculo-skeletal: Negative for back pain, muscle pain, weakness. Neurologic: Negative for headaches, dizziness, vertigo, memory problems not related to HE. Psychology: Negative for anxiety, depression. FAMILY HISTORY:  The father  at age 80 years. The mother is alive and healthy at age 80 years. There is no family history of liver disease. There is no family history of immune disorders. SOCIAL HISTORY:  The patient is . The patient has 3 children, and 6 grandchildren. The patient has never used tobacco products. The patient consumes 1 alcoholic beverages per day historically. She discontinued alcohol with recognition of liver problem. The patient used to work in banking. The patient retired in .       PHYSICAL EXAMINATION:  Visit Vitals  /82 (BP 1 Location: Right arm, BP Patient Position: Sitting)   Pulse 83   Temp 98 °F (36.7 °C) (Tympanic)   Resp 16   Ht 5' 2\" (1.575 m)   Wt 134 lb (60.8 kg)   SpO2 99%   BMI 24.51 kg/m²     General: No acute distress. Eyes: Sclera anicteric. ENT: No oral lesions. Thyroid normal.  Nodes: No adenopathy. Skin: No spider angiomata. No jaundice. No palmar erythema. Respiratory: Lungs clear to auscultation. Cardiovascular: Regular heart rate. No murmurs. No JVD. Abdomen: Soft non-tender. Liver edge firm and easily palpable 2-3 cm BCM. Spleen not palpable. No obvious ascites. Extremities: No edema. No muscle wasting. No gross arthritic changes. Neurologic: Alert and oriented. Cranial nerves grossly intact. No asterixis. LABORATORY STUDIES:  Liver [place] of [address] Units 7/31/2019 4/9/2019 1/3/2019 9/28/2018 6/28/2018 4/25/2018 2/2/2018   WBC 3.4 - 10.8 x10E3/uL 4.8 4.6 6.2 4.6 3.7 5.1 3.6   ANC 1.4 - 7.0 x10E3/uL - - - - - - -   HGB 11.1 - 15.9 g/dL 13.9 15.0 13.5 13.9 14.3 14.2 13.8    - 450 x10E3/uL 202 192 208 167 189 183 169   AST 0 - 40 IU/L 23 23 20 25 27 26 24   ALT 0 - 32 IU/L 19 20 19 21 26 19 19   Alk Phos 39 - 117 IU/L 48 57 57 61 66 71 74   Bili, Total 0.0 - 1.2 mg/dL 0.4 0.5 0.3 0.4 0.4 0.5 0.5   Bili, Direct 0.00 - 0.40 mg/dL 0.12 0.16 0.12 - - 0.14 -   Albumin 3.6 - 4.8 g/dL 4. 9(H) 4. 9(H) 4. 9(H) 4.6 4. 9(H) 5. 1(H) 4.7   BUN 8 - 27 mg/dL 16 16 18 15 20 19 18   Creat 0.57 - 1.00 mg/dL 0.78 0.92 0.74 0.80 0.86 0.73 0.80   Na 134 - 144 mmol/L 143 141 142 145(H) 142 141 142   K 3.5 - 5.2 mmol/L 4.6 4.9 4.2 5.0 4.7 4.8 4.5   Cl 96 - 106 mmol/L 105 103 104 105 103 99 103   CO2 20 - 29 mmol/L 22 23 22 24 20 25 23   Glucose 65 - 99 mg/dL 92 98 80 96 98 97 98       SEROLOGIES:  Serologies Latest Ref Rn 3/18/2016   Hep A Ab, Total Negative Positive (A)   Hep B Surface Ag Negative Negative   KATLYN, IFA  See patterns   KATLYN Pattern  1:1280 (H)   C-ANCA Neg:<1:20 titer <1:20   P-ANCA Neg:<1:20 titer <1:20   ANCA Neg:<1:20 titer <1:20   ASMCA 0 - 19 Units 71 (H)   M2 Ab 0.0 - 20.0 Units 11.4 Anti-SLA 0.0 - 20.0 units 1.0   LKM 0.0 - 20.0 Units 1.4   10/2015. Anti-HCV negative, KATLYN negative, ASMA positive    LIVER HISTOLOGY:  11/2015. Slides reviewed by MLS. Severe active hepatitis with PMN, bridging necrosis and bridging fibrosis. 4/2018. FibroScan performed at [address]. EkPa was 10.9. Suggested fibrosis level is F3. CAP score is 324, suggestive of steatosis. 10/2019. FibroScan performed at [address]. EkPa was 6.5. Suggested fibrosis level is F0-1. CAP score 268, this is consistent with steatosis. 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 In-office fibroscan is consistent with past biopsy findings. Current Fibroscan done at today's office visit has suggested a marked reduction in fibrosis scoring and steatosis. I have reviewed these findings in detail with the patient. The patient is currently receiving treatment with tacrolimus 1 mg bid as a monotherapy since 5/2018. Recent labs have shown normal enzymes, appropriate tac levels and normal renal function. Will repeat in the office today. Will plan to maintain the tacrolimus dosing at 1 mg bid as a long-term course. Will have her follow-up for repeat lab testing and tac level in 6 months for assessment of stability pending review of today's tests. Will perform laboratory testing to monitor liver function and degree of liver injury every 12 weeks. This will include BMP, hepatic panel, CBC with platelet count and tac level. No additional labs drawn in the office today. Serologic and virologic studies to assess for other causes of chronic liver disease were negative. Hyperlipidemia. Patient reports excellent tolerance and response to restart of Crestor.   Will continue this at the low dose of 10 mg. Hypertension. Pressures have been improved recently and she continues to monitor on a regular basis. The patient was directed to continue all current medications at the current dosages. There are no contraindications for the patient to take any medications that are necessary for treatment of other medical issues. The patient was counseled regarding alcohol consumption. She was advised to continue with moderation of alcohol until we get the process under control. The need for vaccination against viral hepatitis B will be assessed with serologic and instituted as appropriate. She is immune to hepatitis A. All of the above issues were discussed with the patient. All questions were answered. The patient expressed a clear understanding of the above. [address] [address] in 6 months.     Gaye Shelley PA-C  Liver [place] of [address] [place] [address], [address] [address]  [address].  [phone]

## 2019-10-17 NOTE — PROGRESS NOTES
Identified pt with two pt identifiers(name and ). Reviewed record in preparation for visit and have obtained necessary documentation. Chief Complaint   Patient presents with    Other     Fibroscan        Health Maintenance Due   Topic    Hepatitis C Screening     DTaP/Tdap/Td series (1 - Tdap)    Shingrix Vaccine Age 50> (1 of 2)    BREAST CANCER SCRN MAMMOGRAM     FOBT Q 1 YEAR AGE 50-75     GLAUCOMA SCREENING Q2Y     Bone Densitometry (Dexa) Screening     Pneumococcal 65+ years (1 of 2 - PCV13)    MEDICARE YEARLY EXAM     Influenza Age 5 to Adult         Visit Vitals  /82 (BP 1 Location: Right arm, BP Patient Position: Sitting)   Pulse 83   Temp 98 °F (36.7 °C) (Tympanic)   Resp 16   Ht 5' 2\" (1.575 m)   Wt 134 lb (60.8 kg)   SpO2 99%   BMI 24.51 kg/m²     Pain Scale: 0 - No pain/10    Coordination of Care Questionnaire:  :   1. Have you been to the ER, urgent care clinic since your last visit? Hospitalized since your last visit? No    2. Have you seen or consulted any other health care providers outside of the [address] [place] [address] since your last visit? Include any pap smears or colon screening.  No

## 2019-10-18 LAB
25(OH)D3+25(OH)D2 SERPL-MCNC: 40.6 NG/ML (ref 30–100)
ALBUMIN SERPL-MCNC: 5.2 G/DL (ref 3.6–4.8)
ALP SERPL-CCNC: 54 IU/L (ref 39–117)
ALT SERPL-CCNC: 24 IU/L (ref 0–32)
AST SERPL-CCNC: 27 IU/L (ref 0–40)
BILIRUB DIRECT SERPL-MCNC: 0.19 MG/DL (ref 0–0.4)
BILIRUB SERPL-MCNC: 0.7 MG/DL (ref 0–1.2)
BUN SERPL-MCNC: 17 MG/DL (ref 8–27)
BUN/CREAT SERPL: 21 (ref 12–28)
CALCIUM SERPL-MCNC: 9.8 MG/DL (ref 8.7–10.3)
CHLORIDE SERPL-SCNC: 100 MMOL/L (ref 96–106)
CHOLEST SERPL-MCNC: 159 MG/DL (ref 100–199)
CO2 SERPL-SCNC: 25 MMOL/L (ref 20–29)
CREAT SERPL-MCNC: 0.8 MG/DL (ref 0.57–1)
ERYTHROCYTE [DISTWIDTH] IN BLOOD BY AUTOMATED COUNT: 11.8 % (ref 12.3–15.4)
GLUCOSE SERPL-MCNC: 85 MG/DL (ref 65–99)
HCT VFR BLD AUTO: 44.7 % (ref 34–46.6)
HDLC SERPL-MCNC: 81 MG/DL
HGB BLD-MCNC: 14.9 G/DL (ref 11.1–15.9)
LDLC SERPL CALC-MCNC: 66 MG/DL (ref 0–99)
MAGNESIUM SERPL-MCNC: 1.8 MG/DL (ref 1.6–2.3)
MCH RBC QN AUTO: 31.7 PG (ref 26.6–33)
MCHC RBC AUTO-ENTMCNC: 33.3 G/DL (ref 31.5–35.7)
MCV RBC AUTO: 95 FL (ref 79–97)
PLATELET # BLD AUTO: 160 X10E3/UL (ref 150–450)
POTASSIUM SERPL-SCNC: 4.6 MMOL/L (ref 3.5–5.2)
RBC # BLD AUTO: 4.7 X10E6/UL (ref 3.77–5.28)
SODIUM SERPL-SCNC: 140 MMOL/L (ref 134–144)
TRIGL SERPL-MCNC: 62 MG/DL (ref 0–149)
VLDLC SERPL CALC-MCNC: 12 MG/DL (ref 5–40)
WBC # BLD AUTO: 5.6 X10E3/UL (ref 3.4–10.8)

## 2019-10-19 LAB — TACROLIMUS, 700249: 5.6 NG/ML (ref 2–20)

## 2019-10-21 NOTE — PROGRESS NOTES
Pt notified of stable lab findings, continue with present dosing. Copy of labs to be sent to PCP as well.

## 2019-11-19 RX ORDER — TACROLIMUS 1 MG/1
1 CAPSULE ORAL 2 TIMES DAILY
Qty: 180 CAP | Refills: 3 | Status: SHIPPED | OUTPATIENT
Start: 2019-11-19 | End: 2019-12-23 | Stop reason: SDUPTHER

## 2019-12-23 RX ORDER — TACROLIMUS 1 MG/1
1 CAPSULE ORAL 2 TIMES DAILY
Qty: 180 CAP | Refills: 3 | Status: SHIPPED | OUTPATIENT
Start: 2019-12-23 | End: 2020-07-06 | Stop reason: SDUPTHER

## 2020-03-30 DIAGNOSIS — K75.4 AUTOIMMUNE HEPATITIS (HCC): Primary | ICD-10-CM

## 2020-04-13 DIAGNOSIS — K75.4 AUTOIMMUNE HEPATITIS (HCC): ICD-10-CM

## 2020-05-06 LAB
ERYTHROCYTE [DISTWIDTH] IN BLOOD BY AUTOMATED COUNT: 11.8 % (ref 11.7–15.4)
HCT VFR BLD AUTO: 44.2 % (ref 34–46.6)
HGB BLD-MCNC: 14.8 G/DL (ref 11.1–15.9)
MCH RBC QN AUTO: 31.4 PG (ref 26.6–33)
MCHC RBC AUTO-ENTMCNC: 33.5 G/DL (ref 31.5–35.7)
MCV RBC AUTO: 94 FL (ref 79–97)
PLATELET # BLD AUTO: 202 X10E3/UL (ref 150–450)
RBC # BLD AUTO: 4.71 X10E6/UL (ref 3.77–5.28)
WBC # BLD AUTO: 4.6 X10E3/UL (ref 3.4–10.8)

## 2020-05-07 LAB
ALBUMIN SERPL-MCNC: 5.1 G/DL (ref 3.8–4.8)
ALP SERPL-CCNC: 49 IU/L (ref 39–117)
ALT SERPL-CCNC: 48 IU/L (ref 0–32)
AST SERPL-CCNC: 39 IU/L (ref 0–40)
BILIRUB DIRECT SERPL-MCNC: 0.13 MG/DL (ref 0–0.4)
BILIRUB SERPL-MCNC: 0.4 MG/DL (ref 0–1.2)
BUN SERPL-MCNC: 21 MG/DL (ref 8–27)
BUN/CREAT SERPL: 25 (ref 12–28)
CALCIUM SERPL-MCNC: 10 MG/DL (ref 8.7–10.3)
CHLORIDE SERPL-SCNC: 104 MMOL/L (ref 96–106)
CO2 SERPL-SCNC: 22 MMOL/L (ref 20–29)
CREAT SERPL-MCNC: 0.84 MG/DL (ref 0.57–1)
GLUCOSE SERPL-MCNC: 111 MG/DL (ref 65–99)
POTASSIUM SERPL-SCNC: 4.8 MMOL/L (ref 3.5–5.2)
SODIUM SERPL-SCNC: 141 MMOL/L (ref 134–144)

## 2020-05-08 LAB — TACROLIMUS, 700249: 3.6 NG/ML (ref 2–20)

## 2020-05-12 ENCOUNTER — VIRTUAL VISIT (OUTPATIENT)
Dept: HEMATOLOGY | Age: 70
End: 2020-05-12

## 2020-05-12 DIAGNOSIS — K75.4 AUTOIMMUNE HEPATITIS (HCC): Primary | ICD-10-CM

## 2020-05-12 NOTE — PROGRESS NOTES
Reviewed results with patient at the time of office visit. Remain on tac 1 mg bid and repeat labs in 3 months.

## 2020-05-12 NOTE — PROGRESS NOTES
[address] [facility], Sonia ELLER Shaunna Gerlach, MD Oswaldo Barrier, PA-C Barbette Peach, [facility]     April S Colten, [facility]   Boris Telles P-C    Iraida Powell [facility]       Waleska Gambino [address]    at [address], [facility] [address]  22.    [phone]    FAX: [address] [place] [address], [address]    [phone]    FAX: [phone]       Patient Care Team:  Faizan Krause MD as PCP - General (Family Practice)  Rosalina Driver MD (Gastroenterology)      Problem List  Date Reviewed: 10/17/2019          Codes Class Noted    Autoimmune hepatitis [facility]) ICD-10-CM: K75.4  ICD-9-CM: 571.42  3/18/2016        Hypertension ICD-10-CM: I10  ICD-9-CM: 401.9  3/18/2016        Hypercholesterolemia ICD-10-CM: E78.00  ICD-9-CM: 272.0  3/18/2016            VIRTUAL TELEHEALTH VISIT PERFORMED DUE TO COVID-19 EPIDEMIC    CONSENT:  Deb Murry, who was seen by synchronous, real-time, audio-video technology, and/or her healthcare decision maker, is aware that this patient-initiated, Telehealth encounter on 5/12/2020 is a billable service, with coverage as determined by her insurance carrier. She is aware that she may receive a bill and has provided verbal consent to proceed. This patient was evaluated during a Virtual Telehealth visit. A caregiver was present if appropriate. Due to this being a TeleHealth encounter performed during the SQJSN-91 public health emergency, the physical examination was limited to that listed in the [address] [place] presents to the [place] [address] for management of autoimmune hepatitis.   The active problem list, all pertinent past medical history, medications, liver histology, and laboratory findings related to the liver disorder were reviewed with the patient. The patient is a 72 y.o.  female who was first noted to have abnormalities in liver transaminases into the 1000 range in 12/2015 at an outside GI office. Serologic evaluation for markers of chronic liver disease were positive for KATLYN and ASMA. Ultrasound of the liver was performed in 7/2015. The results of the imaging demonstrated a normal appearing liver. A liver biopsy was performed in 11/2015. The biopsy slides were reviewed by Dr. Iona Hurst and demonstrated a severe active hepatitis with bridging necrosis and fibrosis. The patient had been taking Lipitor at the time of enzyme elevation. This was stopped when the liver enzymes were noted to be elevated. The patient relates the timing of the elevation with when she was switched from Crestor to Lipitor. The liver enzymes remained elevated following discontinuation of statin prompting investigation of other sources, leading to the autoimmune diagnosis. She has since restarted treatment for cholesterol with Crestor again and is doing well with this medication and has responded well with reduction in values as evidenced with her last labs. With recognition of autoimmune hepatitis, the patient initiated treatment with prednisone 30 mg every day and imuran 50 mg every day in 11/2015. The liver enzymes declined from 1000 to about 300-400 range and then stabilized. Since establishing with this office, we had increased the dose of the Imuran to 100 mg daily and decreased the prednisone to 20 mg daily. She had been on this dose for ~6 months and due to lack of response to therapy, we added tacrolimus as a third agent to her regimen in 10/2017. We have continued to decrease dosing of prednisone and she has now been off this medication since 6/2017. We then withdrew use of azathioprine in 4/2018.   She has no new medication-related side effects at present and feels well in general.  Her current dosing since 5/2018 has been tacrolimus 1 mg bid only. She has continued to tolerate this dose well. The most recent laboratory studies drawn last week indicate that the liver transaminases remain lowered with immunosuppression, but are up a bit relative to where she's been running for the past two years. In speaking with her, she identified that she has been drinking wine on a daily basis with the [employer] of social restrictions. 2+ glasses daily, which represents a big change for her. She is otherwise maintaining regular exercise and weight and has had no new medication starts. Tests of hepatic synthetic and metabolic function are normal, and the platelet count is normal.  Her renal function and tacrolimus levels are appropriate. The patient has no symptoms which could be attributed to the liver disorder. The patient completes all daily activities without any functional limitations. The patient has not experienced fatigue, pain in the right side over the liver, problems concentrating, swelling of the abdomen, swelling of the lower extremities, hematemesis, hematochezia. ALLERGIES  No Known Allergies    MEDICATIONS  Current Outpatient Medications   Medication Sig    tacrolimus (PROGRAF) 1 mg capsule Take 1 Cap by mouth two (2) times a day.  rosuvastatin (CRESTOR) 10 mg tablet Take 10 mg by mouth daily.  lisinopril (PRINIVIL, ZESTRIL) 20 mg tablet Take  by mouth daily.  diphenhydrAMINE (BENADRYL) 25 mg capsule Take 25 mg by mouth once. No current facility-administered medications for this visit. SYSTEM REVIEW NOT RELATED TO LIVER DISEASE OR REVIEWED ABOVE:  Constitution systems: Negative for fever, chills, weight gain, weight loss. Eyes: Negative for visual changes. Recent abrasions of cornea bilaterally. ENT: Negative for sore throat, painful swallowing. Respiratory: Negative for cough, hemoptysis, SOB. Cardiology: Negative for chest pain, palpitations. GI:  Negative for constipation or diarrhea. : Negative for urinary frequency, dysuria, hematuria, nocturia. Skin: Negative for rash. Hematology: Negative for easy bruising, blood clots. Musculo-skeletal: Negative for back pain, muscle pain, weakness. Neurologic: Negative for headaches, dizziness, vertigo, memory problems not related to HE. Psychology: Negative for anxiety, depression. FAMILY HISTORY:  The father  at age 80 years. The mother is alive and healthy at age 80 years. There is no family history of liver disease. There is no family history of immune disorders. SOCIAL HISTORY:  The patient is . The patient has 3 children, and 6 grandchildren. The patient has never used tobacco products. The patient consumes 1 alcoholic beverages per day historically. She discontinued alcohol with recognition of liver problem, but states that she has resumed more regular (daily) intake in Spring 2020. The patient used to work in banking. The patient retired in . PHYSICAL EXAMINATION PERFORMED BY VIRTUAL TELEHEALTH:  VS: Not performed   General: No acute distress. Eyes: Sclera anicteric. ENT: No oral lesions. Skin: No rashes. spider angiomata. No jaundice. Abdomen: No obvious distention suggesting ascites. Extremities: No edema. No muscle wasting. Neurologic: Alert and oriented. Cranial nerves grossly intact.     LABORATORY STUDIES:  Liver [place] of [address] Units 2020 10/17/2019 2019 2019 1/3/2019 2018 2018   WBC 3.4 - 10.8 x10E3/uL 4.6 5.6 4.8 4.6 6.2 4.6 3.7   ANC 1.4 - 7.0 x10E3/uL - - - - - - -   HGB 11.1 - 15.9 g/dL 14.8 14.9 13.9 15.0 13.5 13.9 14.3    - 450 x10E3/uL 202 160 202 192 208 167 189   AST 0 - 40 IU/L 39 27 23 23 20 25 27   ALT 0 - 32 IU/L 48(H) 24 19 20 19 21 26   Alk Phos 39 - 117 IU/L 49 54 48 57 57 61 66   Bili, Total 0.0 - 1.2 mg/dL 0.4 0.7 0.4 0.5 0.3 0.4 0.4   Bili, Direct 0.00 - 0.40 mg/dL 0.13 0.19 0.12 0.16 0.12 - -   Albumin 3.8 - 4.8 g/dL 5. 1(H) 5. 2(H) 4. 9(H) 4. 9(H) 4. 9(H) 4.6 4. 9(H)   BUN 8 - 27 mg/dL 21 17 16 16 18 15 20   Creat 0.57 - 1.00 mg/dL 0.84 0.80 0.78 0.92 0.74 0.80 0.86   Na 134 - 144 mmol/L 141 140 143 141 142 145(H) 142   K 3.5 - 5.2 mmol/L 4.8 4.6 4.6 4.9 4.2 5.0 4.7   Cl 96 - 106 mmol/L 104 100 105 103 104 105 103   CO2 20 - 29 mmol/L 22 25 22 23 22 24 20   Glucose 65 - 99 mg/dL 111(H) 85 92 98 80 96 98   Magnesium 1.6 - 2.3 mg/dL - 1.8 - - - - -   Some recent data might be hidden     SEROLOGIES:  Serologies Latest Ref Rng 3/18/2016   Hep A Ab, Total Negative Positive (A)   Hep B Surface Ag Negative Negative   KATLYN, IFA  See patterns   KATLYN Pattern  1:1280 (H)   C-ANCA Neg:<1:20 titer <1:20   P-ANCA Neg:<1:20 titer <1:20   ANCA Neg:<1:20 titer <1:20   ASMCA 0 - 19 Units 71 (H)   M2 Ab 0.0 - 20.0 Units 11.4   Anti-SLA 0.0 - 20.0 units 1.0   LKM 0.0 - 20.0 Units 1.4   10/2015. Anti-HCV negative, KATLYN negative, ASMA positive    LIVER HISTOLOGY:  11/2015. Slides reviewed by MLS. Severe active hepatitis with PMN, bridging necrosis and bridging fibrosis. 4/2018. FibroScan performed at [facility]. EkPa was 10.9. Suggested fibrosis level is F3. CAP score is 324, suggestive of steatosis. 10/2019. FibroScan performed at [facility]. EkPa was 6.5. Suggested fibrosis level is F0-1. CAP score 268, this is consistent with steatosis. 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 In-office fibroscan is consistent with past biopsy findings. Recent (10/2019) Fibroscan has suggested a marked reduction in fibrosis scoring and steatosis.     The patient is currently receiving treatment with tacrolimus 1 mg bid as a monotherapy since 5/2018. Recent labs have shown a slight bump in liver enzymes, appropriate tac levels and normal renal function. This may be due to resumed alcohol intake. Patient understands and has no problem with discontinuing alcohol and retesting to monitor response. Will repeat labs in 12 weeks. She will remain on present dosing of tacrolimus. Serologic and virologic studies to assess for other causes of chronic liver disease were negative. Hyperlipidemia. Patient reports excellent tolerance and response to restart of Crestor. Will continue this at the low dose of 10 mg. Hypertension. Pressures have been improved recently and she continues to monitor on a regular basis. The patient was directed to continue all current medications at the current dosages. There are no contraindications for the patient to take any medications that are necessary for treatment of other medical issues. The patient was counseled regarding alcohol consumption. She was advised to continue with moderation of alcohol until we get the process under control. The need for vaccination against viral hepatitis B will be assessed with serologic and instituted as appropriate. She is immune to hepatitis A. All of the above issues were discussed with the patient. All questions were answered. The patient expressed a clear understanding of the above. FOLLOW-UP:  Pursuant to the emergency declaration under the [facility], [address] [place] [address] authority and the [employer] Prell and [employer] General Act, this Virtual  Visit was conducted, with the patient's (and/or their legal guardian's) consent, to reduce the patient's risk of exposure to COVID-19 and provide necessary medical care. Services were provided through a video synchronous discussion virtually to substitute for an in-person clinic visit.   The patient was located in their home. The provider was located in the [place] [address] office. Because of the COVID-19 epidemic all non-emergent diagnostic testing and  The in-office visit will be delayed by several months to reduce the risk of patient exposure to and potential infection from the novel corona virus. This follow-up appointment may be delayed further if warranted by the status of the epidemic at that time. Orders to obtain laboratory testing will be mailed to the patient and obtained in 3 months. An in-person follow-up visit will be scheduled in 6 months with labs in the meantime.      Amy Parks PA-C  [facility], [facility] 22.  [phone]  [address] [place] [address]

## 2020-07-06 RX ORDER — TACROLIMUS 1 MG/1
1 CAPSULE ORAL 2 TIMES DAILY
Qty: 180 CAP | Refills: 3 | Status: SHIPPED | OUTPATIENT
Start: 2020-07-06 | End: 2020-07-09 | Stop reason: SDUPTHER

## 2020-07-06 NOTE — TELEPHONE ENCOUNTER
Requested Prescriptions     Pending Prescriptions Disp Refills    tacrolimus (PROGRAF) 1 mg capsule 180 Cap 3     Sig: Take 1 Cap by mouth two (2) times a day.

## 2020-07-09 RX ORDER — TACROLIMUS 1 MG/1
1 CAPSULE ORAL 2 TIMES DAILY
Qty: 60 CAP | Refills: 2 | Status: SHIPPED | OUTPATIENT
Start: 2020-07-09 | End: 2020-07-15 | Stop reason: SDUPTHER

## 2020-07-15 RX ORDER — TACROLIMUS 1 MG/1
1 CAPSULE ORAL 2 TIMES DAILY
Qty: 180 CAP | Refills: 3 | Status: SHIPPED | OUTPATIENT
Start: 2020-07-15 | End: 2021-03-12

## 2020-08-03 DIAGNOSIS — K75.4 AUTOIMMUNE HEPATITIS (HCC): ICD-10-CM

## 2020-08-26 ENCOUNTER — DOCUMENTATION ONLY (OUTPATIENT)
Dept: HEMATOLOGY | Age: 70
End: 2020-08-26

## 2020-08-26 NOTE — PROGRESS NOTES
rec'd labs drawn through PCP office:    From 8/19/2020  AST/ALT/ALP/T Bili/ALB: 23/23/46/0.3/4.9  WBC/HB/PLT/INR:4.3/15.0/196  NA/BUN/CREAT:139/18/0.73  Tac: 4.0    I have advisedvia [address] message that these values remain in target range and that she is to remain on tac 1 mg bid. Follow-up for virtual visit in 11/2020.     Kalie Prince PA-C  Liver [facility] 59, [address] [facility] 22.  [phone]  [address] [facility]

## 2020-11-12 ENCOUNTER — VIRTUAL VISIT (OUTPATIENT)
Dept: HEMATOLOGY | Age: 70
End: 2020-11-12
Payer: MEDICARE

## 2020-11-12 DIAGNOSIS — K75.4 AUTOIMMUNE HEPATITIS (HCC): Primary | ICD-10-CM

## 2020-11-12 PROCEDURE — 99214 OFFICE O/P EST MOD 30 MIN: CPT | Performed by: PHYSICIAN ASSISTANT

## 2020-11-12 NOTE — PROGRESS NOTES
Marisabel Lee MD, Pearlean Cogan, Daun Hiss, MD Daved Guile, PA-CARI Kline, ACNP-BC     April S Colten, [facility]-BC   Desirae Friedman, FNP-C    Raúl Devi, [facility]       Waleska Gambino [address]    at [address], [facility] Kenny Cedeno  22.    [phone]    FAX: [address] [place] [address], [address]    [phone]    FAX: [phone]       Patient Care Team:  Danilo Waite MD as PCP - General (Family Medicine)  Amber Saha MD (Gastroenterology)      Problem List  Date Reviewed: 5/12/2020          Codes Class Noted    Autoimmune hepatitis [facility]) ICD-10-CM: K75.4  ICD-9-CM: 571.42  3/18/2016        Hypertension ICD-10-CM: I10  ICD-9-CM: 401.9  3/18/2016        Hypercholesterolemia ICD-10-CM: E78.00  ICD-9-CM: 272.0  3/18/2016            VIRTUAL TELEHEALTH VISIT PERFORMED DUE TO COVID-19 EPIDEMIC    CONSENT:  Logan Alexandra, who was seen by synchronous, real-time, audio-video technology, and/or her healthcare decision maker, is aware that this patient-initiated, Telehealth encounter on 11/12/2020 is a billable service, with coverage as determined by her insurance carrier. She is aware that she may receive a bill and has provided verbal consent to proceed. This patient was evaluated during a Virtual Telehealth visit. A caregiver was present if appropriate. Due to this being a TeleHealth encounter performed during the XOQ-96 public health emergency, the physical examination was limited to that listed in the [address] [place] presents to the [place] [address] for management of autoimmune hepatitis.   The active problem list, all pertinent past medical history, medications, liver histology, and laboratory findings related to the liver disorder were reviewed with the patient. The patient is a 72 y.o.  female who was first noted to have abnormalities in liver transaminases into the 1000 range in 12/2015 at an outside GI office. Serologic evaluation for markers of chronic liver disease were positive for KATLYN and ASMA. Ultrasound of the liver was performed in 7/2015. The results of the imaging demonstrated a normal appearing liver. A liver biopsy was performed in 11/2015. The biopsy slides were reviewed by Dr. Ana Kang and demonstrated a severe active hepatitis with bridging necrosis and fibrosis. The patient had been taking Lipitor at the time of enzyme elevation. This was stopped when the liver enzymes were noted to be elevated. The patient relates the timing of the elevation with when she was switched from Crestor to Lipitor. The liver enzymes remained elevated following discontinuation of statin prompting investigation of other sources, leading to the autoimmune diagnosis. She has since restarted treatment for cholesterol with Crestor again and is doing well with this medication and has responded well with reduction in values as evidenced with her last labs. With recognition of autoimmune hepatitis, the patient initiated treatment with prednisone 30 mg every day and imuran 50 mg every day in 11/2015. The liver enzymes declined from 1000 to about 300-400 range and then stabilized. Since establishing with this office, we had increased the dose of the Imuran to 100 mg daily and decreased the prednisone to 20 mg daily. She had been on this dose for ~6 months and due to lack of response to therapy, we added tacrolimus as a third agent to her regimen in 10/2017. We have continued to decrease dosing of prednisone and she has now been off this medication since 6/2017. We then withdrew use of azathioprine in 4/2018.   She has no new medication-related side effects at present and feels well in general.  Her current dosing since 2018 has been tacrolimus 1 mg bid only. She has continued to tolerate this dose well. The most recent laboratory studies drawn 2020 indicate that the liver transaminases remain normal as does BP and renal functions. The patient has no symptoms which could be attributed to the liver disorder. The patient completes all daily activities without any functional limitations. The patient has not experienced fatigue, pain in the right side over the liver, problems concentrating, swelling of the abdomen, swelling of the lower extremities, hematemesis, hematochezia. ALLERGIES  No Known Allergies    MEDICATIONS  Current Outpatient Medications   Medication Sig    tacrolimus (PROGRAF) 1 mg capsule Take 1 Cap by mouth two (2) times a day.  rosuvastatin (CRESTOR) 10 mg tablet Take 10 mg by mouth daily.  lisinopril (PRINIVIL, ZESTRIL) 20 mg tablet Take  by mouth daily.  diphenhydrAMINE (BENADRYL) 25 mg capsule Take 25 mg by mouth once. No current facility-administered medications for this visit. SYSTEM REVIEW NOT RELATED TO LIVER DISEASE OR REVIEWED ABOVE:  Constitution systems: Negative for fever, chills, weight gain, weight loss. Eyes: Negative for visual changes. Recent abrasions of cornea bilaterally. ENT: Negative for sore throat, painful swallowing. Respiratory: Negative for cough, hemoptysis, SOB. Cardiology: Negative for chest pain, palpitations. GI:  Negative for constipation or diarrhea. : Negative for urinary frequency, dysuria, hematuria, nocturia. Skin: Negative for rash. Hematology: Negative for easy bruising, blood clots. Musculo-skeletal: Negative for back pain, muscle pain, weakness. Neurologic: Negative for headaches, dizziness, vertigo, memory problems not related to HE. Psychology: Negative for anxiety, depression. FAMILY HISTORY:  The father  at age 80 years.     The mother is alive and healthy at age 80 years. There is no family history of liver disease. There is no family history of immune disorders. SOCIAL HISTORY:  The patient is . The patient has 3 children, and 6 grandchildren. The patient has never used tobacco products. The patient consumes 1 alcoholic beverages per day historically. She discontinued alcohol with recognition of liver problem, but states that she has resumed more regular (daily) intake in Spring 2020. The patient used to work in banking. The patient retired in 2014. PHYSICAL EXAMINATION PERFORMED BY VIRTUAL TELEHEALTH:  VS: Not performed   General: No acute distress. Eyes: Sclera anicteric. ENT: No oral lesions. Skin: No rashes. spider angiomata. No jaundice. Abdomen: No obvious distention suggesting ascites. Extremities: No edema. No muscle wasting. Neurologic: Alert and oriented. Cranial nerves grossly intact. LABORATORY STUDIES:  From 8/2020  AST/ALT/ALP/T Bili/ALB:23/23/46/0.3/4.9  WBC/HB/PLT/INR:4.3/15/196  NA/BUN/CREAT:139/18/0.73  Tac 4      Liver [place] [address] Ref Rng & Units 5/6/2020 10/17/2019 7/31/2019 4/9/2019 1/3/2019 9/28/2018 6/28/2018   WBC 3.4 - 10.8 x10E3/uL 4.6 5.6 4.8 4.6 6.2 4.6 3.7   HGB 11.1 - 15.9 g/dL 14.8 14.9 13.9 15.0 13.5 13.9 14.3    - 450 x10E3/uL 202 160 202 192 208 167 189   AST 0 - 40 IU/L 39 27 23 23 20 25 27   ALT 0 - 32 IU/L 48(H) 24 19 20 19 21 26   Alk Phos 39 - 117 IU/L 49 54 48 57 57 61 66   Bili, Total 0.0 - 1.2 mg/dL 0.4 0.7 0.4 0.5 0.3 0.4 0.4   Bili, Direct 0.00 - 0.40 mg/dL 0.13 0.19 0.12 0.16 0.12 - -   Albumin 3.8 - 4.8 g/dL 5. 1(H) 5. 2(H) 4. 9(H) 4. 9(H) 4. 9(H) 4.6 4. 9(H)   BUN 8 - 27 mg/dL 21 17 16 16 18 15 20   Creat 0.57 - 1.00 mg/dL 0.84 0.80 0.78 0.92 0.74 0.80 0.86   Na 134 - 144 mmol/L 141 140 143 141 142 145(H) 142   K 3.5 - 5.2 mmol/L 4.8 4.6 4.6 4.9 4.2 5.0 4.7   Cl 96 - 106 mmol/L 104 100 105 103 104 105 103   CO2 20 - 29 mmol/L 22 25 22 23 22 24 20   Glucose 65 - 99 mg/dL 111(H) 85 92 98 80 96 98   Magnesium 1.6 - 2.3 mg/dL - 1.8 - - - - -   Some recent data might be hidden     SEROLOGIES:  Serologies Latest Ref Rng 3/18/2016   Hep A Ab, Total Negative Positive (A)   Hep B Surface Ag Negative Negative   KATLYN, IFA  See patterns   KATLYN Pattern  1:1280 (H)   C-ANCA Neg:<1:20 titer <1:20   P-ANCA Neg:<1:20 titer <1:20   ANCA Neg:<1:20 titer <1:20   ASMCA 0 - 19 Units 71 (H)   M2 Ab 0.0 - 20.0 Units 11.4   Anti-SLA 0.0 - 20.0 units 1.0   LKM 0.0 - 20.0 Units 1.4   10/2015. Anti-HCV negative, KATLYN negative, ASMA positive    LIVER HISTOLOGY:  11/2015. Slides reviewed by MLS. Severe active hepatitis with PMN, bridging necrosis and bridging fibrosis. 4/2018. FibroScan performed at [facility]. EkPa was 10.9. Suggested fibrosis level is F3. CAP score is 324, suggestive of steatosis. 10/2019. FibroScan performed at [facility]. EkPa was 6.5. Suggested fibrosis level is F0-1. CAP score 268, this is consistent with steatosis. 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 In-office fibroscan is consistent with past biopsy findings. Recent (10/2019) Fibroscan has suggested a marked reduction in fibrosis scoring and steatosis. The patient is currently receiving treatment with tacrolimus 1 mg bid as a monotherapy since 5/2018. Recent labs have shown normal liver enzymes, appropriate tac levels and normal renal function. Will repeat labs in the next month or so, then fall back to every 6 months. She will remain on present dosing of tacrolimus. Serologic and virologic studies to assess for other causes of chronic liver disease were negative. Hyperlipidemia.   Patient reports excellent tolerance and response to restart of Crestor. Will continue this at the low dose of 10 mg. Hypertension. Pressures have been improved recently and she continues to monitor on a regular basis. The patient was directed to continue all current medications at the current dosages. There are no contraindications for the patient to take any medications that are necessary for treatment of other medical issues. The patient was counseled regarding alcohol consumption. She was advised to continue with moderation of alcohol until we get the process under control. The need for vaccination against viral hepatitis B will be assessed with serologic and instituted as appropriate. She is immune to hepatitis A. All of the above issues were discussed with the patient. All questions were answered. The patient expressed a clear understanding of the above. FOLLOW-UP:  Pursuant to the emergency declaration under the [address] [place] [address] authority and the [employer] Act, this Virtual  Visit was conducted, with the patient's (and/or their legal guardian's) consent, to reduce the patient's risk of exposure to COVID-19 and provide necessary medical care. Services were provided through a video synchronous discussion virtually to substitute for an in-person clinic visit. The patient was located in their home. The provider was located in the [place] [address] office. Because of the COVID-19 epidemic all non-emergent diagnostic testing and  The in-office visit will be delayed by several months to reduce the risk of patient exposure to and potential infection from the novel corona virus. This follow-up appointment may be delayed further if warranted by the status of the epidemic at that time. Orders to obtain laboratory testing will be mailed to the patient and obtained in 3 months.      A virtual visit will be scheduled in 6 months with labs in the meantime.      Ollie Londono PA-C  Liver [facility] 59, [address] [facility] 22.  [phone]  [facility] [address]

## 2021-03-12 RX ORDER — TACROLIMUS 1 MG/1
CAPSULE ORAL
Qty: 180 CAP | Refills: 3 | Status: SHIPPED | OUTPATIENT
Start: 2021-03-12 | End: 2022-03-15

## 2021-03-30 LAB
ERYTHROCYTE [DISTWIDTH] IN BLOOD BY AUTOMATED COUNT: 12.1 % (ref 11.7–15.4)
HCT VFR BLD AUTO: 43 % (ref 34–46.6)
HGB BLD-MCNC: 14.3 G/DL (ref 11.1–15.9)
MCH RBC QN AUTO: 31.6 PG (ref 26.6–33)
MCHC RBC AUTO-ENTMCNC: 33.3 G/DL (ref 31.5–35.7)
MCV RBC AUTO: 95 FL (ref 79–97)
PLATELET # BLD AUTO: 187 X10E3/UL (ref 150–450)
RBC # BLD AUTO: 4.53 X10E6/UL (ref 3.77–5.28)
WBC # BLD AUTO: 5 X10E3/UL (ref 3.4–10.8)

## 2021-03-31 LAB
ALBUMIN SERPL-MCNC: 4.7 G/DL (ref 3.8–4.8)
ALP SERPL-CCNC: 48 IU/L (ref 39–117)
ALT SERPL-CCNC: 26 IU/L (ref 0–32)
AST SERPL-CCNC: 28 IU/L (ref 0–40)
BILIRUB DIRECT SERPL-MCNC: 0.15 MG/DL (ref 0–0.4)
BILIRUB SERPL-MCNC: 0.4 MG/DL (ref 0–1.2)
BUN SERPL-MCNC: 15 MG/DL (ref 8–27)
BUN/CREAT SERPL: 18 (ref 12–28)
CALCIUM SERPL-MCNC: 9.6 MG/DL (ref 8.7–10.3)
CHLORIDE SERPL-SCNC: 104 MMOL/L (ref 96–106)
CO2 SERPL-SCNC: 20 MMOL/L (ref 20–29)
CREAT SERPL-MCNC: 0.82 MG/DL (ref 0.57–1)
GLUCOSE SERPL-MCNC: 101 MG/DL (ref 65–99)
POTASSIUM SERPL-SCNC: 4.4 MMOL/L (ref 3.5–5.2)
PROT SERPL-MCNC: 6.9 G/DL (ref 6–8.5)
SODIUM SERPL-SCNC: 141 MMOL/L (ref 134–144)
TACROLIMUS, 700249: 6.1 NG/ML (ref 2–20)

## 2021-05-17 ENCOUNTER — VIRTUAL VISIT (OUTPATIENT)
Dept: HEMATOLOGY | Age: 71
End: 2021-05-17
Payer: MEDICARE

## 2021-05-17 DIAGNOSIS — K75.4 AUTOIMMUNE HEPATITIS (HCC): Primary | ICD-10-CM

## 2021-05-17 PROCEDURE — G8427 DOCREV CUR MEDS BY ELIG CLIN: HCPCS | Performed by: PHYSICIAN ASSISTANT

## 2021-05-17 PROCEDURE — G8400 PT W/DXA NO RESULTS DOC: HCPCS | Performed by: PHYSICIAN ASSISTANT

## 2021-05-17 PROCEDURE — G8432 DEP SCR NOT DOC, RNG: HCPCS | Performed by: PHYSICIAN ASSISTANT

## 2021-05-17 PROCEDURE — G0463 HOSPITAL OUTPT CLINIC VISIT: HCPCS | Performed by: PHYSICIAN ASSISTANT

## 2021-05-17 PROCEDURE — 99213 OFFICE O/P EST LOW 20 MIN: CPT | Performed by: PHYSICIAN ASSISTANT

## 2021-05-17 PROCEDURE — G8756 NO BP MEASURE DOC: HCPCS | Performed by: PHYSICIAN ASSISTANT

## 2021-05-17 PROCEDURE — 3017F COLORECTAL CA SCREEN DOC REV: CPT | Performed by: PHYSICIAN ASSISTANT

## 2021-05-17 PROCEDURE — 1090F PRES/ABSN URINE INCON ASSESS: CPT | Performed by: PHYSICIAN ASSISTANT

## 2021-05-17 PROCEDURE — 1101F PT FALLS ASSESS-DOCD LE1/YR: CPT | Performed by: PHYSICIAN ASSISTANT

## 2021-05-17 NOTE — PROGRESS NOTES
Herson Vuong MD, Pan [place], Kimberly Jenkins MD      [facility], RICKIE Correia, ACNP-BC     Eleni BARRETO Colten, [facility]   Glory Garcia P-CARI Buchanan, [facility]       [facility] [address]    at [place] [address], [address] [address] Cedeno  22.    [phone]    FAX: [address] [place] [address], [address]    [phone]    FAX: [phone]       Patient Care Team:  Severiano Cohen MD as PCP - General (Family Medicine)  Julissa Mcneil MD (Gastroenterology)      Problem List  Date Reviewed: 11/12/2020          Codes Class Noted    Autoimmune hepatitis [facility]) ICD-10-CM: K75.4  ICD-9-CM: 571.42  3/18/2016        Hypertension ICD-10-CM: I10  ICD-9-CM: 401.9  3/18/2016        Hypercholesterolemia ICD-10-CM: E78.00  ICD-9-CM: 272.0  3/18/2016            VIRTUAL TELEHEALTH VISIT PERFORMED DUE TO COVID-19 EPIDEMIC    CONSENT:  Suhas Pickard, who was seen by synchronous, real-time, audio-video technology, and/or her healthcare decision maker, is aware that this patient-initiated, Telehealth encounter on 5/17/2021 is a billable service, with coverage as determined by her insurance carrier. She is aware that she may receive a bill and has provided verbal consent to proceed. This patient was evaluated during a Virtual Telehealth visit. A caregiver was present if appropriate. Due to this being a TeleHealth encounter performed during the KINWF-00 public health emergency, the physical examination was limited to that listed in the [address] presents to the [place] [address] for management of autoimmune hepatitis.   The active problem list, all pertinent past medical history, medications, liver histology, and laboratory findings related to the liver disorder were reviewed with the patient. The patient is a 80 y.o.  female who was first noted to have abnormalities in liver transaminases into the 1000 range in 12/2015 at an outside GI office. Serologic evaluation for markers of chronic liver disease were positive for KATLYN and ASMA. Ultrasound of the liver was performed in 7/2015. The results of the imaging demonstrated a normal appearing liver. A liver biopsy was performed in 11/2015. The biopsy slides were reviewed by Dr. Dorthey Spurling and demonstrated a severe active hepatitis with bridging necrosis and fibrosis. The patient had been taking Lipitor at the time of enzyme elevation. This was stopped when the liver enzymes were noted to be elevated. The patient relates the timing of the elevation with when she was switched from Crestor to Lipitor. The liver enzymes remained elevated following discontinuation of statin prompting investigation of other sources, leading to the autoimmune diagnosis. She has since restarted treatment for cholesterol with Crestor again and is doing well with this medication and has responded well with reduction in values as evidenced with her last labs. With recognition of autoimmune hepatitis, the patient initiated treatment with prednisone 30 mg every day and imuran 50 mg every day in 11/2015. The liver enzymes declined from 1000 to about 300-400 range and then stabilized. Since establishing with this office, we had increased the dose of the Imuran to 100 mg daily and decreased the prednisone to 20 mg daily. She had been on this dose for ~6 months and due to lack of response to therapy, we added tacrolimus as a third agent to her regimen in 10/2017. We have continued to decrease dosing of prednisone and she has now been off this medication since 6/2017. We then withdrew use of azathioprine in 4/2018.   She has no new medication-related side effects at present and feels well in general.  Her current dosing since 5/2018 has been tacrolimus 1 mg bid only. She has continued to tolerate this dose well and has no new complaints at this time. The most recent laboratory studies drawn 3/2021 indicate that the liver transaminases remain normal as does BP and renal functions. She is monitoring her BP intermittently at home. The patient has no symptoms which could be attributed to the liver disorder. The patient completes all daily activities without any functional limitations. The patient has not experienced fatigue, pain in the right side over the liver, problems concentrating, swelling of the abdomen, swelling of the lower extremities, hematemesis, hematochezia. 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 She has received the COVID vaccine as of 2/2021. ALLERGIES  No Known Allergies    MEDICATIONS  Current Outpatient Medications   Medication Sig    tacrolimus (PROGRAF) 1 mg capsule TAKE 1 CAPSULE BY MOUTH 2 TIMES A DAY    rosuvastatin (CRESTOR) 10 mg tablet Take 10 mg by mouth daily.  lisinopril (PRINIVIL, ZESTRIL) 20 mg tablet Take  by mouth daily.  diphenhydrAMINE (BENADRYL) 25 mg capsule Take 25 mg by mouth once. No current facility-administered medications for this visit. SYSTEM REVIEW NOT RELATED TO LIVER DISEASE OR REVIEWED ABOVE:  Constitution systems: Negative for fever, chills, weight gain, weight loss. Eyes: Negative for visual changes. ENT: Negative for sore throat, painful swallowing. Respiratory: Negative for cough, hemoptysis, SOB. Cardiology: Negative for chest pain, palpitations. GI:  Negative for constipation or diarrhea. : Negative for urinary frequency, dysuria, hematuria, nocturia. Skin: Negative for rash. Hematology: Negative for easy bruising, blood clots.     Musculo-skeletal: Negative for back pain, muscle pain, weakness. Neurologic: Negative for headaches, dizziness, vertigo, memory problems not related to HE. Psychology: Negative for anxiety, depression. FAMILY HISTORY:  The father  at age 80 years. The mother is alive and healthy in her late 80s. There is no family history of liver disease. There is no family history of immune disorders. SOCIAL HISTORY:  The patient is . The patient has 3 children, and 6 grandchildren. The patient has never used tobacco products. The patient consumes 1 alcoholic beverages per day historically. She discontinued alcohol with recognition of liver problem, but states that she has resumed more regular (daily) intake in Spring 2020. The patient used to work in banking. The patient retired in . PHYSICAL EXAMINATION PERFORMED BY VIRTUAL TELEHEALTH:  VS: Not performed   General: No acute distress. Eyes: Sclera anicteric. ENT: No oral lesions. Skin: No rashes. spider angiomata. No jaundice. Abdomen: No obvious distention suggesting ascites. Extremities: No edema. No muscle wasting. Neurologic: Alert and oriented. Cranial nerves grossly intact. LABORATORY STUDIES:    Liver [place] [address] 3/30/2021 2020 10/17/2019 2019 2019 1/3/2019 2018   WBC 3.4 - 10.8 x10E3/uL 5.0 4.6 5.6 4.8 4.6 6.2 4.6   HGB 11.1 - 15.9 g/dL 14.3 14.8 14.9 13.9 15.0 13.5 13.9    - 450 x10E3/uL 187 202 160 202 192 208 167   AST 0 - 40 IU/L 28 39 27 23 23 20 25   ALT 0 - 32 IU/L 26 48(H) 24 19 20 19 21   Alk Phos 39 - 117 IU/L 48 49 54 48 57 57 61   Bili, Total 0.0 - 1.2 mg/dL 0.4 0.4 0.7 0.4 0.5 0.3 0.4   Bili, Direct 0.00 - 0.40 mg/dL 0.15 0.13 0.19 0.12 0.16 0.12 -   Albumin 3.8 - 4.8 g/dL 4.7 5. 1(H) 5. 2(H) 4. 9(H) 4. 9(H) 4. 9(H) 4.6   BUN 8 - 27 mg/dL 15 21 17 16 16 18 15   Creat 0.57 - 1.00 mg/dL 0.82 0.84 0.80 0.78 0.92 0.74 0.80   Na 134 - 144 mmol/L 141 141 140 143 141 142 145(H)   K 3.5 - 5.2 mmol/L 4.4 4.8 4.6 4.6 4.9 4.2 5.0   Cl 96 - 106 mmol/L 104 104 100 105 103 104 105   CO2 20 - 29 mmol/L 20 22 25 22 23 22 24   Glucose 65 - 99 mg/dL 101(H) 111(H) 85 92 98 80 96   Magnesium 1.6 - 2.3 mg/dL - - 1.8 - - - -   Some recent data might be hidden     From 8/2020  AST/ALT/ALP/T Bili/ALB:23/23/46/0.3/4.9  WBC/HB/PLT/INR:4.3/15/196  NA/BUN/CREAT:139/18/0.73  Tac 4    Liver Virology and Transplant Immune Suppression Latest Ref Rng & Units 3/30/2021   Tacrolimus Level 2.0 - 20.0 ng/mL 6.1     Liver Virology and Transplant Immune Suppression Latest Ref Rng & Units 5/6/2020   Tacrolimus Level 2.0 - 20.0 ng/mL 3.6     Liver Virology and Transplant Immune Suppression Latest Ref Rng & Units 10/17/2019   Tacrolimus Level 2.0 - 20.0 ng/mL 5.6     SEROLOGIES:  Serologies Latest Ref Rng 3/18/2016   Hep A Ab, Total Negative Positive (A)   Hep B Surface Ag Negative Negative   KATLYN, IFA  See patterns   KATLYN Pattern  1:1280 (H)   C-ANCA Neg:<1:20 titer <1:20   P-ANCA Neg:<1:20 titer <1:20   ANCA Neg:<1:20 titer <1:20   ASMCA 0 - 19 Units 71 (H)   M2 Ab 0.0 - 20.0 Units 11.4   Anti-SLA 0.0 - 20.0 units 1.0   LKM 0.0 - 20.0 Units 1.4   10/2015. Anti-HCV negative, KATLYN negative, ASMA positive    LIVER HISTOLOGY:  11/2015. Slides reviewed by MLS. Severe active hepatitis with PMN, bridging necrosis and bridging fibrosis. 4/2018. [facility]. EkPa was 10.9. Suggested fibrosis level is F3. CAP score is 324, suggestive of steatosis. 10/2019. FibroScan performed at [facility]. EkPa was 6.5. Suggested fibrosis level is F0-1. CAP score 268, this is consistent with steatosis. 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    In-office fibroscan is consistent with past biopsy findings. Recent (10/2019) Fibroscan has suggested a marked reduction in fibrosis scoring and steatosis. The patient is currently receiving treatment with tacrolimus 1 mg bid as a monotherapy since 5/2018. Recent labs have shown normal liver enzymes, appropriate tac levels and normal renal function. I have reviewed with her in detail the results of the recent 3/30/2021 lab results and these remain within good ranges. Will repeat labs every 6 months, prior to the time of her next office visit in Fall 2021. She will remain on present dosing of tacrolimus. Serologic and virologic studies to assess for other causes of chronic liver disease were negative. Hyperlipidemia. Patient reports excellent tolerance and response to restart of Crestor. Will continue this at the low dose of 10 mg. Hypertension. Pressures have been improved recently and she continues to monitor on a regular basis. The patient was directed to continue all current medications at the current dosages. There are no contraindications for the patient to take any medications that are necessary for treatment of other medical issues. The patient was counseled regarding alcohol consumption. She was advised to continue with moderation of alcohol until we get the process under control. The need for vaccination against viral hepatitis B will be assessed with serologic and instituted as appropriate. She is immune to hepatitis A. She has had full COVID vaccination. All of the above issues were discussed with the patient. All questions were answered. The patient expressed a clear understanding of the above.     FOLLOW-UP:  Pursuant to the emergency declaration under the [facility], [facility] waiver authority and the [employer] Act, this Virtual  Visit was conducted, with the patient's (and/or their legal guardian's) consent, to reduce the patient's risk of exposure to COVID-19 and provide necessary medical care. Services were provided through a video synchronous discussion virtually to substitute for an in-person clinic visit. The patient was located in their home. The provider was located in the [facility] office. Because of the COVID-19 epidemic all non-emergent diagnostic testing and  The in-office visit will be delayed by several months to reduce the risk of patient exposure to and potential infection from the novel corona virus. This follow-up appointment may be delayed further if warranted by the status of the epidemic at that time. Orders to obtain laboratory testing will be mailed to the patient and obtained in 3 months. A virtual visit will be scheduled in 6 months with labs at that time prior to her appointment. Documentation reviewed and updated to reflect current, accurate patient information.     Adriano Parekh PA-C  [facility] 59, [address] [facility] 22.  [phone]  [address] [place] [address]

## 2021-05-17 NOTE — PROGRESS NOTES
Identified pt with two pt identifiers(name and ). Reviewed record in preparation for visit and have obtained necessary documentation. No chief complaint on file. There were no vitals filed for this visit. Health Maintenance Review: Patient reminded of \"due or due soon\" health maintenance. I have asked the patient to contact his/her primary care provider (PCP) for follow-up on his/her health maintenance. Coordination of Care Questionnaire:  :   1) Have you been to an emergency room, urgent care, or hospitalized since your last visit? If yes, where when, and reason for visit? no       2. Have seen or consulted any other health care provider since your last visit? If yes, where when, and reason for visit? Yes, regular care team f/u      Patient is accompanied by self I have received verbal consent from Amanda Carver to discuss any/all medical information while they are present in the room.

## 2021-11-05 LAB
ALBUMIN SERPL-MCNC: 5.3 G/DL (ref 3.8–4.8)
ALP SERPL-CCNC: 53 IU/L (ref 44–121)
ALT SERPL-CCNC: 51 IU/L (ref 0–32)
AST SERPL-CCNC: 47 IU/L (ref 0–40)
BILIRUB DIRECT SERPL-MCNC: 0.15 MG/DL (ref 0–0.4)
BILIRUB SERPL-MCNC: 0.4 MG/DL (ref 0–1.2)
BUN SERPL-MCNC: 19 MG/DL (ref 8–27)
BUN/CREAT SERPL: 20 (ref 12–28)
CALCIUM SERPL-MCNC: 10.2 MG/DL (ref 8.7–10.3)
CHLORIDE SERPL-SCNC: 99 MMOL/L (ref 96–106)
CO2 SERPL-SCNC: 23 MMOL/L (ref 20–29)
CREAT SERPL-MCNC: 0.95 MG/DL (ref 0.57–1)
ERYTHROCYTE [DISTWIDTH] IN BLOOD BY AUTOMATED COUNT: 11.9 % (ref 11.7–15.4)
GLUCOSE SERPL-MCNC: 111 MG/DL (ref 65–99)
HCT VFR BLD AUTO: 47.7 % (ref 34–46.6)
HGB BLD-MCNC: 15.5 G/DL (ref 11.1–15.9)
MCH RBC QN AUTO: 31.4 PG (ref 26.6–33)
MCHC RBC AUTO-ENTMCNC: 32.5 G/DL (ref 31.5–35.7)
MCV RBC AUTO: 97 FL (ref 79–97)
PLATELET # BLD AUTO: 207 X10E3/UL (ref 150–450)
POTASSIUM SERPL-SCNC: 4.8 MMOL/L (ref 3.5–5.2)
PROT SERPL-MCNC: 7.9 G/DL (ref 6–8.5)
RBC # BLD AUTO: 4.94 X10E6/UL (ref 3.77–5.28)
SODIUM SERPL-SCNC: 139 MMOL/L (ref 134–144)
WBC # BLD AUTO: 4.5 X10E3/UL (ref 3.4–10.8)

## 2021-11-09 LAB — TACROLIMUS, 700249: 6.4 NG/ML (ref 2–20)

## 2021-11-11 ENCOUNTER — VIRTUAL VISIT (OUTPATIENT)
Dept: HEMATOLOGY | Age: 71
End: 2021-11-11
Payer: MEDICARE

## 2021-11-11 DIAGNOSIS — K75.4 AUTOIMMUNE HEPATITIS (HCC): Primary | ICD-10-CM

## 2021-11-11 PROCEDURE — G0463 HOSPITAL OUTPT CLINIC VISIT: HCPCS | Performed by: PHYSICIAN ASSISTANT

## 2021-11-11 PROCEDURE — 1101F PT FALLS ASSESS-DOCD LE1/YR: CPT | Performed by: PHYSICIAN ASSISTANT

## 2021-11-11 PROCEDURE — 3017F COLORECTAL CA SCREEN DOC REV: CPT | Performed by: PHYSICIAN ASSISTANT

## 2021-11-11 PROCEDURE — G8432 DEP SCR NOT DOC, RNG: HCPCS | Performed by: PHYSICIAN ASSISTANT

## 2021-11-11 PROCEDURE — G8756 NO BP MEASURE DOC: HCPCS | Performed by: PHYSICIAN ASSISTANT

## 2021-11-11 PROCEDURE — G8427 DOCREV CUR MEDS BY ELIG CLIN: HCPCS | Performed by: PHYSICIAN ASSISTANT

## 2021-11-11 PROCEDURE — 1090F PRES/ABSN URINE INCON ASSESS: CPT | Performed by: PHYSICIAN ASSISTANT

## 2021-11-11 PROCEDURE — 99214 OFFICE O/P EST MOD 30 MIN: CPT | Performed by: PHYSICIAN ASSISTANT

## 2021-11-11 PROCEDURE — G8400 PT W/DXA NO RESULTS DOC: HCPCS | Performed by: PHYSICIAN ASSISTANT

## 2021-11-11 NOTE — Clinical Note
NOTIFICATION RETURN TO WORK / SCHOOL    11/11/2021 12:13 PM    Ms. Sarah Jensen  [address] [place] [id number]      To Whom It May Concern:    Sarah Jensen is currently under the care of [address]. She will return to work/school on: ***    If there are questions or concerns please have the patient contact our office.         Sincerely,      CHRISTOPH Salinas

## 2021-11-11 NOTE — PROGRESS NOTES
Fang Nettles is a 80 y.o. female  HIPAA verified by two patient identifiers. Health Maintenance Due   Topic    Hepatitis C Screening     DTaP/Tdap/Td series (1 - Tdap)    Colorectal Cancer Screening Combo     Shingrix Vaccine Age 50> (1 of 2)    Breast Cancer Screen Mammogram     Bone Densitometry (Dexa) Screening     Pneumococcal 65+ years (1 of 1 - PPSV23)    Medicare Yearly Exam     Lipid Screen     COVID-19 Vaccine (3 - Pfizer risk 4-dose series)    Flu Vaccine (1)     Chief Complaint   Patient presents with    Follow-up     Patient-Reported Vitals 11/11/2021   Patient-Reported Weight 130lb   Patient-Reported Pulse -   Patient-Reported Temperature -   Patient-Reported SpO2 -   Patient-Reported Systolic  447   Patient-Reported Diastolic 70       Pain Scale: /10  Pain Location:             1. Have you been to the ER, urgent care clinic since your last visit? Hospitalized since your last visit? No    2. Have you seen or consulted any other health care providers outside of the [address] [place] [address] since your last visit? Include any pap smears or colon screening.  No

## 2021-11-11 NOTE — PROGRESS NOTES
Albertina Rivera MD, Klarissa Caballero MD Coleridge Raid, PA-C June Nevin [facility]     April S Colten [facility]   Zulma Rashid P-CARI Coats [facility]       Waleska Gambino [facility] 136    at [place] [address], [address] [address] Cedeno  22.    [phone]    FAX: [address] [place] [address], [address]    [phone]    FAX: [phone]       Patient Care Team:  Yue Preciado MD as PCP - General (Family Medicine)  Dolores Cliare MD (Gastroenterology)      Problem List  Date Reviewed: 5/17/2021          Codes Class Noted    Autoimmune hepatitis [facility]) ICD-10-CM: K75.4  ICD-9-CM: 571.42  3/18/2016        Hypertension ICD-10-CM: I10  ICD-9-CM: 401.9  3/18/2016        Hypercholesterolemia ICD-10-CM: E78.00  ICD-9-CM: 272.0  3/18/2016            VIRTUAL TELEHEALTH VISIT PERFORMED DUE TO COVID-19 EPIDEMIC    CONSENT:  Faviola Melendez, who was seen by synchronous, real-time, audio-video technology, and/or her healthcare decision maker, is aware that this patient-initiated, Telehealth encounter on 11/11/2021 is a billable service, with coverage as determined by her insurance carrier. She is aware that she may receive a bill and has provided verbal consent to proceed. This patient was evaluated during a Virtual Telehealth visit. A caregiver was present if appropriate. Due to this being a TeleHealth encounter performed during the [place] [address] public health emergency, the physical examination was limited to that listed in the [address] presents to the [place] [address] for management of autoimmune hepatitis.   The active problem list, all pertinent past medical history, medications, liver histology, and laboratory findings related to the liver disorder were reviewed with the patient. The patient is a 80 y.o.  female who was first noted to have abnormalities in liver transaminases into the 1000 range in 12/2015 at an outside GI office. Serologic evaluation for markers of chronic liver disease were positive for KATLYN and ASMA. Ultrasound of the liver was performed in 7/2015. The results of the imaging demonstrated a normal appearing liver. A liver biopsy was performed in 11/2015. The biopsy slides were reviewed by Dr. Marine Cooper and demonstrated a severe active hepatitis with bridging necrosis and fibrosis. The patient had been taking Lipitor at the time of enzyme elevation. This was stopped when the liver enzymes were noted to be elevated. The patient relates the timing of the elevation with when she was switched from Crestor to Lipitor. The liver enzymes remained elevated following discontinuation of statin prompting investigation of other sources, leading to the autoimmune diagnosis. She has since restarted treatment for cholesterol with Crestor again and is doing well with this medication and has responded well with reduction in values as evidenced with her last lipid value. With recognition of autoimmune hepatitis, the patient initiated treatment with prednisone 30 mg every day and imuran 50 mg every day in 11/2015. The liver enzymes declined from 1000 to about 300-400 range and then stabilized. Since establishing with this office, we had increased the dose of the Imuran to 100 mg daily and decreased the prednisone to 20 mg daily. She had been on this dose for ~6 months and due to lack of response to therapy, we added tacrolimus as a third agent to her regimen in 10/2017. We have continued to decrease dosing of prednisone and she has now been off this medication since 6/2017. We then withdrew use of azathioprine in 4/2018.   She has no new medication-related side effects at present and feels well in general.  Her current dosing since 5/2018 has been tacrolimus 1 mg bid only. She has continued to tolerate this dose well and has no new complaints at this time. The most recent laboratory studies drawn 11/2021 indicate that the liver transaminases have elevated relative to the stable normal values that she has been running of late. She states that she has had no changes in medications. She has had a modest weight gain of 4-5# in the past several months. She has also had some mild increase in alcohol consumption over the Summer. She has not missed doses of medications. The patient has no symptoms which could be attributed to the liver disorder. The patient completes all daily activities without any functional limitations. The patient has not experienced fatigue, pain in the right side over the liver, problems concentrating, swelling of the abdomen, swelling of the lower extremities, hematemesis, hematochezia. 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 She has received the COVID vaccine as of 2/2021. Booster was received late 8/2021. She is continuing to be careful. Of note, she had flu vaccination the week of her labs, prior to the recent lab draw. ALLERGIES  No Known Allergies    MEDICATIONS  Current Outpatient Medications   Medication Sig    tacrolimus (PROGRAF) 1 mg capsule TAKE 1 CAPSULE BY MOUTH 2 TIMES A DAY    rosuvastatin (CRESTOR) 10 mg tablet Take 10 mg by mouth daily.  lisinopril (PRINIVIL, ZESTRIL) 20 mg tablet Take  by mouth daily.  diphenhydrAMINE (BENADRYL) 25 mg capsule Take 25 mg by mouth once. No current facility-administered medications for this visit. SYSTEM REVIEW NOT RELATED TO LIVER DISEASE OR REVIEWED ABOVE:  Constitution systems: Negative for fever, chills, weight gain, weight loss.    Eyes: Negative for visual changes. ENT: Negative for sore throat, painful swallowing. Respiratory: Negative for cough, hemoptysis, SOB. Cardiology: Negative for chest pain, palpitations. GI:  Negative for constipation or diarrhea. : Negative for urinary frequency, dysuria, hematuria, nocturia. Skin: Negative for rash. Hematology: Negative for easy bruising, blood clots. Musculo-skeletal: Negative for back pain, muscle pain, weakness. Neurologic: Negative for headaches, dizziness, vertigo, memory problems not related to HE. Psychology: Negative for anxiety, depression. FAMILY HISTORY:  The father  at age 80 years. The mother is alive and healthy in her late 80s. There is no family history of liver disease. There is no family history of immune disorders. SOCIAL HISTORY:  The patient is . The patient has 3 children, and 6 grandchildren. The patient has never used tobacco products. The patient consumes 1 alcoholic beverages per day historically. She discontinued alcohol with recognition of liver problem, but states that she has resumed more regular (daily) intake in Spring 2020. The patient used to work in banking. The patient retired in . PHYSICAL EXAMINATION PERFORMED BY VIRTUAL TELEHEALTH:  VS: Not performed   General: No acute distress. Eyes: Sclera anicteric. ENT: No oral lesions. Skin: No rashes. spider angiomata. No jaundice. Abdomen: No obvious distention suggesting ascites. Extremities: No edema. No muscle wasting. Neurologic: Alert and oriented. Cranial nerves grossly intact.     LABORATORY STUDIES:    Liver [place] of [address] Units 2021 3/30/2021 2020 10/17/2019 2019 2019 1/3/2019   WBC 3.4 - 10.8 x10E3/uL 4.5 5.0 4.6 5.6 4.8 4.6 6.2   HGB 11.1 - 15.9 g/dL 15.5 14.3 14.8 14.9 13.9 15.0 13.5    - 450 x10E3/uL 207 187 202 160 202 192 208   AST 0 - 40 IU/L 47(H) 28 39 27 23 23 20   ALT 0 - 32 IU/L 51(H) 26 48(H) 24 19 20 19   Alk Phos 44 - 121 IU/L 53 48 49 54 48 57 57   Bili, Total 0.0 - 1.2 mg/dL 0.4 0.4 0.4 0.7 0.4 0.5 0.3   Bili, Direct 0.00 - 0.40 mg/dL 0.15 0.15 0.13 0.19 0.12 0.16 0.12   Albumin 3.8 - 4.8 g/dL 5. 3(H) 4.7 5. 1(H) 5. 2(H) 4. 9(H) 4. 9(H) 4. 9(H)   BUN 8 - 27 mg/dL 19 15 21 17 16 16 18   Creat 0.57 - 1.00 mg/dL 0.95 0.82 0.84 0.80 0.78 0.92 0.74   Na 134 - 144 mmol/L 139 141 141 140 143 141 142   K 3.5 - 5.2 mmol/L 4.8 4.4 4.8 4.6 4.6 4.9 4.2   Cl 96 - 106 mmol/L 99 104 104 100 105 103 104   CO2 20 - 29 mmol/L 23 20 22 25 22 23 22   Glucose 65 - 99 mg/dL 111(H) 101(H) 111(H) 85 92 98 80   Magnesium 1.6 - 2.3 mg/dL - - - 1.8 - - -   Some recent data might be hidden     From 8/2020  AST/ALT/ALP/T Bili/ALB:23/23/46/0.3/4.9  WBC/HB/PLT/INR:4.3/15/196  NA/BUN/CREAT:139/18/0.73  Tac 4  Liver Virology and Transplant Immune Suppression Latest Ref Rng & Units 11/4/2021   Tacrolimus Level 2.0 - 20.0 ng/mL 6.4     Liver Virology and Transplant Immune Suppression Latest Ref Rng & Units 3/30/2021   Tacrolimus Level 2.0 - 20.0 ng/mL 6.1     Liver Virology and Transplant Immune Suppression Latest Ref Rng & Units 5/6/2020   Tacrolimus Level 2.0 - 20.0 ng/mL 3.6       SEROLOGIES:  Serologies Latest Ref Rng 3/18/2016   Hep A Ab, Total Negative Positive (A)   Hep B Surface Ag Negative Negative   KATLYN, IFA  See patterns   KATLYN Pattern  1:1280 (H)   C-ANCA Neg:<1:20 titer <1:20   P-ANCA Neg:<1:20 titer <1:20   ANCA Neg:<1:20 titer <1:20   ASMCA 0 - 19 Units 71 (H)   M2 Ab 0.0 - 20.0 Units 11.4   Anti-SLA 0.0 - 20.0 units 1.0   LKM 0.0 - 20.0 Units 1.4   10/2015. Anti-HCV negative, KATLYN negative, ASMA positive    LIVER HISTOLOGY:  11/2015. Slides reviewed by MLS. Severe active hepatitis with PMN, bridging necrosis and bridging fibrosis. 4/2018. Fib[facility]Scan performed at [facility]. EkPa was 10.9. Suggested fibrosis level is F3. CAP score is 324, suggestive of steatosis. 10/2019.   FibroScan performed at [address]. EkPa was 6.5. Suggested fibrosis level is F0-1. CAP score 268, this is consistent with steatosis. 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 Last (10/2019) Fibroscan has suggested a marked reduction in fibrosis scoring and steatosis. The patient is currently receiving treatment with tacrolimus 1 mg bid as a monotherapy since 5/2018. Recent labs have shown elevation in liver enzymes, we will need to monitor and make determination if dosing needs to be altered. Plan on repeat labs in 1 month and we will gauge if there is indication for raising the dose of medication and I have reminded her to get a true trough level of tacrolimus. She notes that she did have flu shot the week of her labs last time. ? If this could have contributed to elevation in enzymes. Serologic and virologic studies to assess for other causes of chronic liver disease were negative. Hyperlipidemia. Patient reports excellent tolerance and response to restart of Crestor. Will continue this at the low dose of 10 mg. Hypertension. Pressures have been improved recently and she continues to monitor on a regular basis. The patient was directed to continue all current medications at the current dosages. There are no contraindications for the patient to take any medications that are necessary for treatment of other medical issues. The patient was counseled regarding alcohol consumption. She was advised to continue with moderation of alcohol until we get the process under control. The need for vaccination against viral hepatitis B will be assessed with serologic and instituted as appropriate. She is immune to hepatitis A. She has had full COVID vaccination and booster.   She has had flu vaccination as well. All of the above issues were discussed with the patient. All questions were answered. The patient expressed a clear understanding of the above. FOLLOW-UP:  Pursuant to the emergency declaration under the [address] [facility], [address] [place] [address] and the [employer] Act, this Virtual  Visit was conducted, with the patient's (and/or their legal guardian's) consent, to reduce the patient's risk of exposure to COVID-19 and provide necessary medical care. Services were provided through a video synchronous discussion virtually to substitute for an in-person clinic visit. The patient was located in their home. The provider was located in the [facility] office. Because of the COVID-19 epidemic all non-emergent diagnostic testing and  The in-office visit will be delayed by several months to reduce the risk of patient exposure to and potential infection from the novel corona virus. This follow-up appointment may be delayed further if warranted by the status of the epidemic at that time. Orders to obtain laboratory testing will be mailed to the patient and obtained in 3 months. A virtual visit will be scheduled in 4 months for virtual with repeat labs in the near future. Documentation reviewed and updated to reflect current, accurate patient information.     Isaiah Menon PA-C  [facility], [address] [facility] [address].  [phone]  [address] [place] [address]

## 2021-11-12 NOTE — PROGRESS NOTES
Reviewed with pt at time of office visit, repeat labs in one month with true trough, may need updosing of tac.

## 2021-12-02 LAB
ERYTHROCYTE [DISTWIDTH] IN BLOOD BY AUTOMATED COUNT: 11.9 % (ref 11.7–15.4)
HCT VFR BLD AUTO: 44 % (ref 34–46.6)
HGB BLD-MCNC: 15.2 G/DL (ref 11.1–15.9)
MCH RBC QN AUTO: 32.2 PG (ref 26.6–33)
MCHC RBC AUTO-ENTMCNC: 34.5 G/DL (ref 31.5–35.7)
MCV RBC AUTO: 93 FL (ref 79–97)
PLATELET # BLD AUTO: 193 X10E3/UL (ref 150–450)
RBC # BLD AUTO: 4.72 X10E6/UL (ref 3.77–5.28)
WBC # BLD AUTO: 4.7 X10E3/UL (ref 3.4–10.8)

## 2021-12-03 LAB
ALBUMIN SERPL-MCNC: 5 G/DL (ref 3.8–4.8)
ALP SERPL-CCNC: 50 IU/L (ref 44–121)
ALT SERPL-CCNC: 45 IU/L (ref 0–32)
AST SERPL-CCNC: 38 IU/L (ref 0–40)
BILIRUB DIRECT SERPL-MCNC: <0.1 MG/DL (ref 0–0.4)
BILIRUB SERPL-MCNC: 0.3 MG/DL (ref 0–1.2)
BUN SERPL-MCNC: 20 MG/DL (ref 8–27)
BUN/CREAT SERPL: 22 (ref 12–28)
CALCIUM SERPL-MCNC: 9.9 MG/DL (ref 8.7–10.3)
CHLORIDE SERPL-SCNC: 104 MMOL/L (ref 96–106)
CO2 SERPL-SCNC: 24 MMOL/L (ref 20–29)
CREAT SERPL-MCNC: 0.9 MG/DL (ref 0.57–1)
GLUCOSE SERPL-MCNC: 102 MG/DL (ref 65–99)
POTASSIUM SERPL-SCNC: 4.8 MMOL/L (ref 3.5–5.2)
PROT SERPL-MCNC: 7.6 G/DL (ref 6–8.5)
SODIUM SERPL-SCNC: 142 MMOL/L (ref 134–144)

## 2021-12-06 LAB — TACROLIMUS, 700249: 3 NG/ML (ref 2–20)

## 2021-12-08 ENCOUNTER — TELEPHONE (OUTPATIENT)
Dept: HEMATOLOGY | Age: 71
End: 2021-12-08

## 2021-12-08 DIAGNOSIS — K75.4 AUTOIMMUNE HEPATITIS (HCC): Primary | ICD-10-CM

## 2021-12-08 NOTE — PROGRESS NOTES
Pt notified via [address] of stable findings, will defer rise in tac levels - overall stable and repeat in 3/2022.

## 2022-02-21 LAB
ERYTHROCYTE [DISTWIDTH] IN BLOOD BY AUTOMATED COUNT: 12.4 % (ref 11.7–15.4)
HCT VFR BLD AUTO: 43.4 % (ref 34–46.6)
HGB BLD-MCNC: 14.6 G/DL (ref 11.1–15.9)
MCH RBC QN AUTO: 31.5 PG (ref 26.6–33)
MCHC RBC AUTO-ENTMCNC: 33.6 G/DL (ref 31.5–35.7)
MCV RBC AUTO: 94 FL (ref 79–97)
PLATELET # BLD AUTO: 200 X10E3/UL (ref 150–450)
RBC # BLD AUTO: 4.64 X10E6/UL (ref 3.77–5.28)
WBC # BLD AUTO: 5.1 X10E3/UL (ref 3.4–10.8)

## 2022-02-22 LAB
ALBUMIN SERPL-MCNC: 4.8 G/DL (ref 3.7–4.7)
ALP SERPL-CCNC: 53 IU/L (ref 44–121)
ALT SERPL-CCNC: 21 IU/L (ref 0–32)
AST SERPL-CCNC: 24 IU/L (ref 0–40)
BILIRUB DIRECT SERPL-MCNC: 0.15 MG/DL (ref 0–0.4)
BILIRUB SERPL-MCNC: 0.6 MG/DL (ref 0–1.2)
BUN SERPL-MCNC: 16 MG/DL (ref 8–27)
BUN/CREAT SERPL: 19 (ref 12–28)
CALCIUM SERPL-MCNC: 9.4 MG/DL (ref 8.7–10.3)
CHLORIDE SERPL-SCNC: 102 MMOL/L (ref 96–106)
CO2 SERPL-SCNC: 22 MMOL/L (ref 20–29)
CREAT SERPL-MCNC: 0.86 MG/DL (ref 0.57–1)
GLUCOSE SERPL-MCNC: 104 MG/DL (ref 65–99)
POTASSIUM SERPL-SCNC: 4.4 MMOL/L (ref 3.5–5.2)
PROT SERPL-MCNC: 7.1 G/DL (ref 6–8.5)
SODIUM SERPL-SCNC: 139 MMOL/L (ref 134–144)

## 2022-02-23 NOTE — PROGRESS NOTES
Pt notified via [facility] letter of stable findings, tac level pending but enzymes and renal functions good. Continue w present dosing and follow-up next month.

## 2022-02-24 LAB — TACROLIMUS, 700249: 3.2 NG/ML (ref 2–20)

## 2022-03-15 RX ORDER — TACROLIMUS 1 MG/1
CAPSULE ORAL
Qty: 180 CAPSULE | Refills: 3 | Status: SHIPPED | OUTPATIENT
Start: 2022-03-15

## 2022-05-12 ENCOUNTER — VIRTUAL VISIT (OUTPATIENT)
Dept: HEMATOLOGY | Age: 72
End: 2022-05-12
Payer: MEDICARE

## 2022-05-12 DIAGNOSIS — K75.4 AUTOIMMUNE HEPATITIS (HCC): Primary | ICD-10-CM

## 2022-05-12 PROCEDURE — G8756 NO BP MEASURE DOC: HCPCS | Performed by: PHYSICIAN ASSISTANT

## 2022-05-12 PROCEDURE — 1090F PRES/ABSN URINE INCON ASSESS: CPT | Performed by: PHYSICIAN ASSISTANT

## 2022-05-12 PROCEDURE — G0463 HOSPITAL OUTPT CLINIC VISIT: HCPCS | Performed by: PHYSICIAN ASSISTANT

## 2022-05-12 PROCEDURE — G8427 DOCREV CUR MEDS BY ELIG CLIN: HCPCS | Performed by: PHYSICIAN ASSISTANT

## 2022-05-12 PROCEDURE — G8432 DEP SCR NOT DOC, RNG: HCPCS | Performed by: PHYSICIAN ASSISTANT

## 2022-05-12 PROCEDURE — 1101F PT FALLS ASSESS-DOCD LE1/YR: CPT | Performed by: PHYSICIAN ASSISTANT

## 2022-05-12 PROCEDURE — G8400 PT W/DXA NO RESULTS DOC: HCPCS | Performed by: PHYSICIAN ASSISTANT

## 2022-05-12 PROCEDURE — 3017F COLORECTAL CA SCREEN DOC REV: CPT | Performed by: PHYSICIAN ASSISTANT

## 2022-05-12 PROCEDURE — 99213 OFFICE O/P EST LOW 20 MIN: CPT | Performed by: PHYSICIAN ASSISTANT

## 2022-05-12 NOTE — Clinical Note
NOTIFICATION RETURN TO WORK / SCHOOL    5/12/2022 7:40 AM    Ms. Shaye Buchanan  [id number] [address] [address] [phone]      To Whom It May Concern:    Shaye Buchanan is currently under the care of [address]. She will return to work/school on: ***    If there are questions or concerns please have the patient contact our office.         Sincerely,      CHRISTOPH Dougherty

## 2022-05-12 NOTE — PROGRESS NOTES
Identified pt with two pt identifiers(name and ). Reviewed record in preparation for visit and have obtained necessary documentation. Chief Complaint   Patient presents with    Hepatitis C     VV 4month f/u with [facility]      There were no vitals filed for this visit. Health Maintenance Review: Patient reminded of \"due or due soon\" health maintenance. I have asked the patient to contact his/her primary care provider (PCP) for follow-up on his/her health maintenance. Coordination of Care Questionnaire:  :   1) Have you been to an emergency room, urgent care, or hospitalized since your last visit? If yes, where when, and reason for visit? no       2. Have seen or consulted any other health care provider since your last visit? If yes, where when, and reason for visit? NO      Patient is accompanied by self I have received verbal consent from Missael Hernandez to discuss any/all medical information while they are present in the room.

## 2022-05-12 NOTE — PROGRESS NOTES
3340 [facility], MD, [address], [facility], [place]      Bruce RICKIE Ortega, [facility]     April S Colten, [facility]   ERIKA Wilson-CARI Caraballo, [facility]       Waleska Gambino Suman De [address] 136    at [address], [address] Kenny Cedeno  22.    [phone]    FAX: [address] [place] [address]    at [address], [address]    [phone]    FAX: [phone]       Patient Care Team:  Tino Barlow MD as PCP - General (Family Medicine)  Nick Elizondo MD (Gastroenterology)      Problem List  Date Reviewed: 11/11/2021          Codes Class Noted    Autoimmune hepatitis [facility]) ICD-10-CM: K75.4  ICD-9-CM: 571.42  3/18/2016        Hypertension ICD-10-CM: I10  ICD-9-CM: 401.9  3/18/2016        Hypercholesterolemia ICD-10-CM: E78.00  ICD-9-CM: 272.0  3/18/2016            VIRTUAL TELEHEALTH VISIT PERFORMED DUE TO COVID-19 EPIDEMIC    CONSENT:  Juan David Ruelas, who was evaluated through a synchronous (real-time) audio-video encounter, and/or his healthcare decision maker, is aware that it is a billable service, which includes applicable co-pays, with coverage as determined by his insurance carrier. He provided verbal consent to proceed and patient identification was verified. This visit was conducted pursuant to the emergency declaration under the [facility], [facility] waiver authority and the [employer] Act. A caregiver was present when appropriate. Ability to conduct physical exam was limited. The patient was located at home in a state where the provider was licensed to provide care.      Juan David Ruelas presents to the [place] [address] for management of autoimmune hepatitis. The active problem list, all pertinent past medical history, medications, liver histology, and laboratory findings related to the liver disorder were reviewed with the patient. The patient is a 71 y.o.  female who was first noted to have abnormalities in liver transaminases into the 1000 range in 12/2015 at an outside GI office. Serologic evaluation for markers of chronic liver disease were positive for KATLYN and ASMA. Ultrasound of the liver was performed in 7/2015. The results of the imaging demonstrated a normal appearing liver. A liver biopsy was performed in 11/2015. The biopsy slides were reviewed by Dr. Ya Dudley and demonstrated a severe active hepatitis with bridging necrosis and fibrosis. The patient had been taking Lipitor at the time of enzyme elevation. This was stopped when the liver enzymes were noted to be elevated. The patient relates the timing of the elevation with when she was switched from Crestor to Lipitor. The liver enzymes remained elevated following discontinuation of statin prompting investigation of other sources, leading to the autoimmune diagnosis. She has since restarted treatment for cholesterol with Crestor again and is doing well with this medication and has responded well with reduction in values as evidenced with her last lipid value. With recognition of autoimmune hepatitis, the patient initiated treatment with prednisone 30 mg every day and imuran 50 mg every day in 11/2015. The liver enzymes declined from 1000 to about 300-400 range and then stabilized. Since establishing with this office, we had increased the dose of the Imuran to 100 mg daily and decreased the prednisone to 20 mg daily. She had been on this dose for ~6 months and due to lack of response to therapy, we added tacrolimus as a third agent to her regimen in 10/2017.  We have continued to decrease dosing of prednisone and she has now been off this medication since 6/2017. We then withdrew use of azathioprine in 4/2018. She has no new medication-related side effects at present and feels well in general.  Her current dosing since 5/2018 has been tacrolimus 1 mg bid only. She has continued to tolerate this dose well and has no new complaints at this time. The most recent laboratory studies drawn 2/2022 indicate that the liver transaminases have normalized recently relative to the mildly elevated values that she has been running of late. The patient has no symptoms which could be attributed to the liver disorder. The patient completes all daily activities without any functional limitations. The patient has not experienced fatigue, pain in the right side over the liver, problems concentrating, swelling of the abdomen, swelling of the lower extremities, hematemesis, hematochezia. 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 She has received the COVID vaccine as of 2/2021. Booster was received late 8/2021. She had COVID in 1/2022 and is fully recovered. She has had 2nd booster 4/13/2022. Cortisone injections in feet for arthritis symptoms and she is feeling better. She notes that she had to go through rabies vaccination series in March and April 2022 after unprovoked raccoon attack. ALLERGIES  No Known Allergies    MEDICATIONS  Current Outpatient Medications   Medication Sig    tacrolimus (PROGRAF) 1 mg capsule TAKE 1 CAPSULE BY MOUTH 2 TIMES A DAY    rosuvastatin (CRESTOR) 10 mg tablet Take 10 mg by mouth daily.  lisinopril (PRINIVIL, ZESTRIL) 20 mg tablet Take  by mouth daily.  diphenhydrAMINE (BENADRYL) 25 mg capsule Take 25 mg by mouth once. No current facility-administered medications for this visit.      SYSTEM REVIEW NOT RELATED TO LIVER DISEASE OR REVIEWED ABOVE:  Constitution systems: Negative for fever, chills, weight gain, weight loss. Eyes: Negative for visual changes. ENT: Negative for sore throat, painful swallowing. Respiratory: Negative for cough, hemoptysis, SOB. Cardiology: Negative for chest pain, palpitations. GI:  Negative for constipation or diarrhea. : Negative for urinary frequency, dysuria, hematuria, nocturia. Skin: Negative for rash. Hematology: Negative for easy bruising, blood clots. Musculo-skeletal: Negative for back pain, muscle pain, weakness. Neurologic: Negative for headaches, dizziness, vertigo, memory problems not related to HE. Psychology: Negative for anxiety, depression. FAMILY HISTORY:  The father  at age 80 years. The mother is alive and healthy in her late 80s. There is no family history of liver disease. There is no family history of immune disorders. SOCIAL HISTORY:  The patient is . The patient has 3 children, and 6 grandchildren. The patient has never used tobacco products. The patient consumes 1 alcoholic beverages per day historically. She discontinued alcohol with recognition of liver problem, but states that she has resumed more regular (daily) intake in Spring 2020. The patient used to work in banking. The patient retired in . PHYSICAL EXAMINATION PERFORMED BY [facility]:  VS: Not performed   General: No acute distress. Eyes: Sclera anicteric. ENT: No oral lesions. Skin: No rashes. spider angiomata. No jaundice. Abdomen: No obvious distention suggesting ascites. Extremities: No edema. No muscle wasting. Neurologic: Alert and oriented. Cranial nerves grossly intact.     LABORATORY STUDIES:    [place] of [address] [place] [address] & Units 2022 2021 2021 3/30/2021 2020 10/17/2019 2019   WBC 3.4 - 10.8 x10E3/uL 5.1 4.7 4.5 5.0 4.6 5.6 4.8   HGB 11.1 - 15.9 g/dL 14.6 15.2 15.5 14.3 14.8 14.9 13.9    - 450 x10E3/uL 200 193 207 187 202 160 202   AST 0 - 40 IU/L 24 38 47(H) 28 39 27 23   ALT 0 - 32 IU/L 21 45(H) 51(H) 26 48(H) 24 19   Alk Phos 44 - 121 IU/L 53 50 53 48 49 54 48   Bili, Total 0.0 - 1.2 mg/dL 0.6 0.3 0.4 0.4 0.4 0.7 0.4   Bili, Direct 0.00 - 0.40 mg/dL 0.15 <0.10 0.15 0.15 0.13 0.19 0.12   Albumin 3.7 - 4.7 g/dL 4. 8(H) 5. 0(H) 5. 3(H) 4.7 5. 1(H) 5. 2(H) 4. 9(H)   BUN 8 - 27 mg/dL 16 20 19 15 21 17 16   Creat 0.57 - 1.00 mg/dL 0.86 0.90 0.95 0.82 0.84 0.80 0.78   Na 134 - 144 mmol/L 139 142 139 141 141 140 143   K 3.5 - 5.2 mmol/L 4.4 4.8 4.8 4.4 4.8 4.6 4.6   Cl 96 - 106 mmol/L 102 104 99 104 104 100 105   CO2 20 - 29 mmol/L 22 24 23 20 22 25 22   Glucose 65 - 99 mg/dL 104(H) 102(H) 111(H) 101(H) 111(H) 85 92   Magnesium 1.6 - 2.3 mg/dL - - - - - 1.8 -   Some recent data might be hidden     Liver Virology and Transplant Immune Suppression Latest Ref Rng & Units 2/21/2022   Tacrolimus Level 2.0 - 20.0 ng/mL 3.2     Liver Virology and Transplant Immune Suppression Latest Ref Rng & Units 12/2/2021   Tacrolimus Level 2.0 - 20.0 ng/mL 3.0     Liver Virology and Transplant Immune Suppression Latest Ref Rng & Units 11/4/2021   Tacrolimus Level 2.0 - 20.0 ng/mL 6.4     SEROLOGIES:  Serologies Latest Ref Rng 3/18/2016   Hep A Ab, Total Negative Positive (A)   Hep B Surface Ag Negative Negative   KATLYN, IFA  See patterns   KATLYN Pattern  1:1280 (H)   C-ANCA Neg:<1:20 titer <1:20   P-ANCA Neg:<1:20 titer <1:20   ANCA Neg:<1:20 titer <1:20   ASMCA 0 - 19 Units 71 (H)   M2 Ab 0.0 - 20.0 Units 11.4   Anti-SLA 0.0 - 20.0 units 1.0   LKM 0.0 - 20.0 Units 1.4   10/2015. Anti-HCV negative, KATLYN negative, ASMA positive    LIVER HISTOLOGY:  11/2015. Slides reviewed by MLS. Severe active hepatitis with PMN, bridging necrosis and bridging fibrosis. 4/2018. [facility]. EkPa was 10.9. Suggested fibrosis level is F3. CAP score is 324, suggestive of steatosis. 10/2019. FibroScan performed at [facility]. EkPa was 6.5.   Suggested fibrosis level is F0-1. CAP score 268, this is consistent with steatosis. 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 Last (10/2019) Fibroscan has suggested a marked reduction in fibrosis scoring and steatosis. The patient is currently receiving treatment with tacrolimus 1 mg bid as a monotherapy since 5/2018. Recent labs have shown normalization again of liver enzymes, we will continue with present dosing . Plan on repeat labs this month and I have reminded her to get a true trough level of tacrolimus. Lab req will be mailed to the patient. Serologic and virologic studies to assess for other causes of chronic liver disease were negative. Hyperlipidemia. Patient reports excellent tolerance and response to restart of Crestor. Will continue this at the low dose of 10 mg. Hypertension. Pressures have been improved recently and she continues to monitor on a regular basis. The patient was directed to continue all current medications at the current dosages. There are no contraindications for the patient to take any medications that are necessary for treatment of other medical issues. The patient was counseled regarding alcohol consumption. She was advised to continue with moderation of alcohol until we get the process under control. The need for vaccination against viral hepatitis B will be assessed with serologic and instituted as appropriate. She is immune to hepatitis A. She has had full COVID vaccination and booster. She has had flu vaccination as well. All of the above issues were discussed with the patient. All questions were answered. The patient expressed a clear understanding of the above.     FOLLOW-UP:  Pursuant to the emergency declaration under the [address] and the National Emergencies Act, [facility] waiver authority and the [employer] Act, this Virtual  Visit was conducted, with the patient's (and/or their legal guardian's) consent, to reduce the patient's risk of exposure to COVID-19 and provide necessary medical care. Services were provided through a video synchronous discussion virtually to substitute for an in-person clinic visit. The patient was located in their home. The provider was located in the [address]. Because of the COVID-19 epidemic all non-emergent diagnostic testing and  The in-office visit will be delayed by several months to reduce the risk of patient exposure to and potential infection from the novel corona virus. This follow-up appointment may be delayed further if warranted by the status of the epidemic at that time. Orders to obtain laboratory testing will be mailed to the patient and obtained in 3 months. A virtual visit will be scheduled in 5 months for virtual with repeat labs in the near future.      Manan Felix PA-C  [facility], [facility] 22.  [phone]  [address]

## 2022-06-10 LAB
ERYTHROCYTE [DISTWIDTH] IN BLOOD BY AUTOMATED COUNT: 12.8 % (ref 11.7–15.4)
HCT VFR BLD AUTO: 44.9 % (ref 34–46.6)
HGB BLD-MCNC: 15.1 G/DL (ref 11.1–15.9)
MCH RBC QN AUTO: 32 PG (ref 26.6–33)
MCHC RBC AUTO-ENTMCNC: 33.6 G/DL (ref 31.5–35.7)
MCV RBC AUTO: 95 FL (ref 79–97)
PLATELET # BLD AUTO: 173 X10E3/UL (ref 150–450)
RBC # BLD AUTO: 4.72 X10E6/UL (ref 3.77–5.28)
WBC # BLD AUTO: 4.4 X10E3/UL (ref 3.4–10.8)

## 2022-06-11 LAB
ALBUMIN SERPL-MCNC: 4.8 G/DL (ref 3.7–4.7)
ALP SERPL-CCNC: 52 IU/L (ref 44–121)
ALT SERPL-CCNC: 143 IU/L (ref 0–32)
AST SERPL-CCNC: 120 IU/L (ref 0–40)
BILIRUB DIRECT SERPL-MCNC: 0.18 MG/DL (ref 0–0.4)
BILIRUB SERPL-MCNC: 0.6 MG/DL (ref 0–1.2)
BUN SERPL-MCNC: 15 MG/DL (ref 8–27)
BUN/CREAT SERPL: 17 (ref 12–28)
CALCIUM SERPL-MCNC: 10 MG/DL (ref 8.7–10.3)
CHLORIDE SERPL-SCNC: 98 MMOL/L (ref 96–106)
CO2 SERPL-SCNC: 25 MMOL/L (ref 20–29)
CREAT SERPL-MCNC: 0.88 MG/DL (ref 0.57–1)
EGFR: 70 ML/MIN/1.73
GLUCOSE SERPL-MCNC: 102 MG/DL (ref 65–99)
POTASSIUM SERPL-SCNC: 4.7 MMOL/L (ref 3.5–5.2)
PROT SERPL-MCNC: 7.7 G/DL (ref 6–8.5)
SODIUM SERPL-SCNC: 138 MMOL/L (ref 134–144)

## 2022-06-13 ENCOUNTER — TELEPHONE (OUTPATIENT)
Dept: HEMATOLOGY | Age: 72
End: 2022-06-13

## 2022-06-13 DIAGNOSIS — K75.4 AUTOIMMUNE HEPATITIS (HCC): Primary | ICD-10-CM

## 2022-06-13 LAB — TACROLIMUS, 700249: 3.1 NG/ML (ref 2–20)

## 2022-06-14 NOTE — PROGRESS NOTES
Advised pt via [address] letter of elevation, ?unclear if related to recent course of rabies and COVID booster. She is feeling well and continues on stable medication dosing. Will plan on repeat lab values when she returns from out of town in the next 10 days or so prior to making any changes in stable tac dosing.

## 2022-06-29 LAB
ALBUMIN SERPL-MCNC: 4.1 G/DL (ref 3.5–5)
ALBUMIN/GLOB SERPL: 1.2 {RATIO} (ref 1.1–2.2)
ALP SERPL-CCNC: 52 U/L (ref 45–117)
ALT SERPL-CCNC: 368 U/L (ref 12–78)
AST SERPL-CCNC: 143 U/L (ref 15–37)
BILIRUB DIRECT SERPL-MCNC: 0.2 MG/DL (ref 0–0.2)
BILIRUB SERPL-MCNC: 0.7 MG/DL (ref 0.2–1)
GLOBULIN SER CALC-MCNC: 3.3 G/DL (ref 2–4)
PROT SERPL-MCNC: 7.4 G/DL (ref 6.4–8.2)

## 2022-06-30 LAB
BUN SERPL-MCNC: 12 MG/DL (ref 8–27)
BUN/CREAT SERPL: 16 (ref 12–28)
CALCIUM SERPL-MCNC: 9.6 MG/DL (ref 8.7–10.3)
CHLORIDE SERPL-SCNC: 99 MMOL/L (ref 96–106)
CO2 SERPL-SCNC: 23 MMOL/L (ref 20–29)
CREAT SERPL-MCNC: 0.75 MG/DL (ref 0.57–1)
EGFR: 85 ML/MIN/1.73
ERYTHROCYTE [DISTWIDTH] IN BLOOD BY AUTOMATED COUNT: 12.3 % (ref 11.7–15.4)
GLUCOSE SERPL-MCNC: 98 MG/DL (ref 65–99)
HCT VFR BLD AUTO: 42.7 % (ref 34–46.6)
HGB BLD-MCNC: 14.5 G/DL (ref 11.1–15.9)
MCH RBC QN AUTO: 32.1 PG (ref 26.6–33)
MCHC RBC AUTO-ENTMCNC: 34 G/DL (ref 31.5–35.7)
MCV RBC AUTO: 95 FL (ref 79–97)
PLATELET # BLD AUTO: 209 X10E3/UL (ref 150–450)
POTASSIUM SERPL-SCNC: 4.7 MMOL/L (ref 3.5–5.2)
RBC # BLD AUTO: 4.52 X10E6/UL (ref 3.77–5.28)
SODIUM SERPL-SCNC: 137 MMOL/L (ref 134–144)
WBC # BLD AUTO: 4.6 X10E3/UL (ref 3.4–10.8)

## 2022-07-01 LAB
AFP L3 MFR SERPL: NORMAL % (ref 0–9.9)
AFP SERPL-MCNC: 1.4 NG/ML (ref 0–9.2)
TACROLIMUS, 700249: 3.1 NG/ML (ref 2–20)

## 2022-07-06 DIAGNOSIS — K75.4 AUTOIMMUNE HEPATITIS (HCC): Primary | ICD-10-CM

## 2022-07-12 ENCOUNTER — TELEPHONE (OUTPATIENT)
Dept: HEMATOLOGY | Age: 72
End: 2022-07-12

## 2022-07-12 RX ORDER — GLUCOSAMINE SULFATE 1500 MG
2000 POWDER IN PACKET (EA) ORAL DAILY
COMMUNITY

## 2022-07-12 NOTE — TELEPHONE ENCOUNTER
Staff from endoscopy called patient in preparation for procedure on 7.19.22. Patient reports that she has been in close contact/direct contact with her sister who was just confirmed to have covid 23. Department is calling to see if Dr. Nanette Bradshaw still wants to see patient for procedure or if it needs to be rescheduled.

## 2022-07-13 NOTE — TELEPHONE ENCOUNTER
[email or web address] Spoke [email or web address] . At this time patient is not having symptoms of COVID. I have ask patient to get tested between tomorrow and Friday 7/15/22 before procedure on 7/19/22. Call office if positive. Patient verbalize understanding. (KF)

## 2022-07-14 ENCOUNTER — TRANSCRIBE ORDER (OUTPATIENT)
Dept: SCHEDULING | Age: 72
End: 2022-07-14

## 2022-07-14 DIAGNOSIS — K75.4 AUTOIMMUNE HEPATITIS (HCC): Primary | ICD-10-CM

## 2022-07-19 ENCOUNTER — HOSPITAL ENCOUNTER (OUTPATIENT)
Age: 72
Setting detail: OUTPATIENT SURGERY
Discharge: HOME | End: 2022-07-19
Attending: INTERNAL MEDICINE | Admitting: INTERNAL MEDICINE
Payer: MEDICARE

## 2022-07-19 ENCOUNTER — APPOINTMENT (OUTPATIENT)
Dept: ULTRASOUND IMAGING | Age: 72
End: 2022-07-19
Attending: INTERNAL MEDICINE
Payer: MEDICARE

## 2022-07-19 VITALS
BODY MASS INDEX: 23.92 KG/M2 | SYSTOLIC BLOOD PRESSURE: 106 MMHG | WEIGHT: 130 LBS | OXYGEN SATURATION: 96 % | RESPIRATION RATE: 21 BRPM | DIASTOLIC BLOOD PRESSURE: 67 MMHG | HEIGHT: 62 IN | HEART RATE: 66 BPM

## 2022-07-19 DIAGNOSIS — K75.4 AUTOIMMUNE HEPATITIS (HCC): ICD-10-CM

## 2022-07-19 PROCEDURE — 76942 ECHO GUIDE FOR BIOPSY: CPT | Performed by: INTERNAL MEDICINE

## 2022-07-19 PROCEDURE — 47000 NEEDLE BIOPSY OF LIVER PERQ: CPT | Performed by: INTERNAL MEDICINE

## 2022-07-19 PROCEDURE — 76040000019: Performed by: INTERNAL MEDICINE

## 2022-07-19 PROCEDURE — 88307 TISSUE EXAM BY PATHOLOGIST: CPT

## 2022-07-19 PROCEDURE — 77030014115: Performed by: INTERNAL MEDICINE

## 2022-07-19 PROCEDURE — 77030013826 HC NDL BIOP MAXCOR BARD -B: Performed by: INTERNAL MEDICINE

## 2022-07-19 PROCEDURE — 76942 ECHO GUIDE FOR BIOPSY: CPT

## 2022-07-19 PROCEDURE — 88313 SPECIAL STAINS GROUP 2: CPT

## 2022-07-19 RX ORDER — SODIUM CHLORIDE 0.9 % (FLUSH) 0.9 %
5-40 SYRINGE (ML) INJECTION AS NEEDED
Status: DISCONTINUED | OUTPATIENT
Start: 2022-07-19 | End: 2022-07-19 | Stop reason: HOSPADM

## 2022-07-19 RX ORDER — LIDOCAINE HYDROCHLORIDE 10 MG/ML
10 INJECTION INFILTRATION; PERINEURAL ONCE
Status: DISCONTINUED | OUTPATIENT
Start: 2022-07-19 | End: 2022-07-19 | Stop reason: HOSPADM

## 2022-07-19 RX ORDER — SODIUM CHLORIDE 0.9 % (FLUSH) 0.9 %
5-40 SYRINGE (ML) INJECTION EVERY 8 HOURS
Status: DISCONTINUED | OUTPATIENT
Start: 2022-07-19 | End: 2022-07-19 | Stop reason: HOSPADM

## 2022-07-19 RX ORDER — FENTANYL CITRATE 50 UG/ML
25 INJECTION, SOLUTION INTRAMUSCULAR; INTRAVENOUS
Status: DISCONTINUED | OUTPATIENT
Start: 2022-07-19 | End: 2022-07-19 | Stop reason: HOSPADM

## 2022-07-19 RX ORDER — ONDANSETRON 2 MG/ML
4 INJECTION INTRAMUSCULAR; INTRAVENOUS
Status: DISCONTINUED | OUTPATIENT
Start: 2022-07-19 | End: 2022-07-19 | Stop reason: HOSPADM

## 2022-07-19 NOTE — H&P
3340 [facility], MD, [address], [place], [place]      RICKIE Vick [facility]-RACHEAL Abel [facility]   EMEKA Ryan [facility]       Waleska Gambino Suman De Mckeon 136    at [address], [facility] Kenny Cedeno  22.    [phone]    FAX: [address] [place] [address], [address]    [phone]    FAX: [phone]         PRE-PROCEDURE NOTE - LIVER BIOPSY    H and P from last office visit reviewed. Allergies reviewed. Out-patient medication list reviewed. Patient Active Problem List   Diagnosis Code    Autoimmune hepatitis ([facility] K75.4    Hypertension I10    Hypercholesterolemia E78.00       No Known Allergies    No current facility-administered medications on file prior to encounter. Current Outpatient Medications on File Prior to Encounter   Medication Sig Dispense Refill    cholecalciferol (Vitamin D3) 25 mcg (1,000 unit) cap Take 2,000 Units by mouth daily.  tacrolimus (PROGRAF) 1 mg capsule TAKE 1 CAPSULE BY MOUTH 2 TIMES A  Capsule 3    rosuvastatin (CRESTOR) 10 mg tablet Take 10 mg by mouth daily.  lisinopril (PRINIVIL, ZESTRIL) 20 mg tablet Take 20 mg by mouth daily.  diphenhydrAMINE (BENADRYL) 25 mg capsule Take 25 mg by mouth nightly as needed. For liver biopsy to assess Autoimmune hepatitis. The risks of the procedure were discussed with the patient. This included bleeding, pain, and puncture of other organs. All questions were answered. The patient wishes to proceed with the procedure.     The patient was counseled at length about the risks of yaakov Covid-19 in the tavia-operative and post-operative states including the recovery window of their procedure. The patient was made aware that yaakov Covid-19 after a surgical procedure may worsen their prognosis for recovering from the virus and lend to a higher morbidity and or mortality risk. The patient was given the options of postponing their procedure. All of the risks, benefits, and alternatives were discussed. The patient does  wish to proceed with the procedure. PHYSICAL EXAMINATION:  Visit Vitals  Ht 5' 2\" (1.575 m)   Wt 130 lb (59 kg)   Breastfeeding No   BMI 23.78 kg/m²       General: No acute distress. Eyes: Sclera anicteric. ENT: No oral lesions. Thyroid normal.  Nodes: No adenopathy. Skin: No spider angiomata. No jaundice. No palmar erythema. Respiratory: Lungs clear to auscultation. Cardiovascular: Regular heart rate. No murmurs. No JVD. Abdomen: Soft non-tender, liver size normal to percussion/palpation. Spleen not palpable. No obvious ascites. Extremities: No edema. No muscle wasting. No gross arthritic changes. Neurologic: Alert and oriented. Cranial nerves grossly intact. No asterixis. LABS:  Lab Results   Component Value Date/Time    WBC 4.6 06/29/2022 09:00 AM    HGB 14.5 06/29/2022 09:00 AM    HCT 42.7 06/29/2022 09:00 AM    PLATELET 408 23/31/0618 09:00 AM    MCV 95 06/29/2022 09:00 AM     No results found for: INR, PTMR, PTP, PT1, PT2, INREXT    ASSESSMENT AND PLAN:  Liver biopsy under ultrasound guidance.     Jojo Tapia MD  [facility] [address] [place] [address] [address] 22.  [phone]  [address] [place] [address]

## 2022-07-19 NOTE — DISCHARGE INSTRUCTIONS
3340 [facility], [place], [address], [place], [place]      RICKIE Hall, [facility]     Eleni Abel, [facility]   EMEKA Dumont, [facility]       Waleska Gambino [facility] 136    at [address], [facility] Kenny Cedeno  22. [phone]    FAX: [address] [place] [address] [address], [address]    [phone]    FAX: [phone]         LIVER BIOPSY DISCHARGE INSTRUCTIONS      Carlitos Starr  1950  Date: 7/19/2022    DIET:    Bing Goldberg may resume your previous diet. ACTIVITIES:  Rest quietly the rest of today. You should not lift any objects more than 20 pounds for the next 2 days. If you work sitting down without strenuous activity you may return to work tomorrow. If you exert yourself or do heavy lifting at work you should take tomorrow off. Do not drive or operate hazardous machinery for 12 hours after you are discharged from this procedure. SPECIAL INSTRUCTIONS:  Do not use any aspirin or non-steroidal (Motin, Advil, Naproxen, etc) pain medications for the next 2 days. You may use extra-strength Tylenol (acetaminophen) if you experience pain or discomfort later today. Restarting blood thinners: If you were taking blood thinners prior to the procedure you can restart these in 2 days. Call the [facility] office if you experience any of the following:  Persistent or severe abdominal pain. Persistent or severe abdominal distention. Fever and chills   Nausea and vomiting. New or unusual symptoms. Follow-up care: You should have a follow up appointment with Dr. Reed [facility] to review the results of the liver biopsy results in 2 weeks.   If you do not have an appointment please call the office at the number listed above to schedule this. Other instructions: If you have any problems or questions call the [facility] office at the phone number listed above. DISCHARGE SUMMARY from Nurse: The following personal items collected during your admission are returned to you:   Dental Appliance: Dental Appliances: None  Vision: Visual Aid: Contacts (l eye)  Hearing Aid:    Jewelry:    Clothing:    Other Valuables:    Valuables sent to safe:            Learning About Coronavirus (COVID-19)  Coronavirus (COVID-19): Overview  What is coronavirus (OSNNY-72)? The coronavirus disease (COVID-19) is caused by a virus. It is an illness that was first found in [place], [place], in December 2019. It has since spread worldwide. The virus can cause fever, cough, and trouble breathing. In severe cases, it can cause pneumonia and make it hard to breathe without help. It can cause death. Coronaviruses are a large group of viruses. They cause the common cold. They also cause more serious illnesses like Middle East respiratory syndrome (MERS) and severe acute respiratory syndrome (SARS). COVID-19 is caused by a novel coronavirus. That means it's a new type that has not been seen in people before. This virus spreads person-to-person through droplets from coughing and sneezing. It can also spread when you are close to someone who is infected. And it can spread when you touch something that has the virus on it, such as a doorknob or a tabletop. What can you do to protect yourself from coronavirus (COVID-19)? The best way to protect yourself from getting sick is to:  · Avoid areas where there is an outbreak. · Avoid contact with people who may be infected. · Wash your hands often with soap or alcohol-based hand sanitizers. · Avoid crowds and try to stay at least 6 feet away from other people. · Wash your hands often, especially after you cough or sneeze.  Use soap and water, and scrub for at least 20 seconds. If soap and water aren't available, use an alcohol-based hand . · Avoid touching your mouth, nose, and eyes. What can you do to avoid spreading the virus to others? To help avoid spreading the virus to others:  · Cover your mouth with a tissue when you cough or sneeze. Then throw the tissue in the trash. · Use a disinfectant to clean things that you touch often. · Stay home if you are sick or have been exposed to the virus. Don't go to school, work, or public areas. And don't use public transportation. · If you are sick:  ? Leave your home only if you need to get medical care. But call the doctor's office first so they know you're coming. And wear a face mask, if you have one.  ? If you have a face mask, wear it whenever you're around other people. It can help stop the spread of the virus when you cough or sneeze. ? Clean and disinfect your home every day. Use household  and disinfectant wipes or sprays. Take special care to clean things that you grab with your hands. These include doorknobs, remote controls, phones, and handles on your refrigerator and microwave. And don't forget countertops, tabletops, bathrooms, and computer keyboards. When to call for help  Call 911 anytime you think you may need emergency care. For example, call if:  · You have severe trouble breathing. (You can't talk at all.)  · You have constant chest pain or pressure. · You are severely dizzy or lightheaded. · You are confused or can't think clearly. · Your face and lips have a blue color. · You pass out (lose consciousness) or are very hard to wake up. Call your doctor now if you develop symptoms such as:  · Shortness of breath. · Fever. · Cough. If you need to get care, call ahead to the doctor's office for instructions before you go. Make sure you wear a face mask, if you have one, to prevent exposing other people to the virus. Where can you get the latest information?   The following [facility] organizations are tracking and studying this virus. Their websites contain the most up-to-date information. Madhav Macias also learn what to do if you think you may have been exposed to the virus. · U.S. Centers for Disease Control and Prevention (CDC): The CDC provides updated news about the disease and travel advice. The website also tells you how to prevent the spread of infection. www.cdc.gov  · World Health Organization [facility]): WHO offers information about the virus outbreaks. WHO also has travel advice. www.who.int  Current as of: April 1, 2020               Content Version: 12.4  © 3975-5159 [employer]. Care instructions adapted under license by your healthcare professional. If you have questions about a medical condition or this instruction, always ask your healthcare professional. [address] any warranty or liability for your use of this information.

## 2022-07-19 NOTE — PROCEDURES
[address] [facility], MD, [address], [place], [place]      RICKIE Wright ACNP-BC     Eleni Abel, [facility]   EMEKA Townsend [facility]       Waleska Gambino [address]    at [address], [address] [place] [address], [place]  22.    [phone]    FAX: [address] [place] [address], [address]    [phone]    FAX: [phone]         LIVER BIOPSY PROCEDURE NOTE    Glenora Nearing  1950    INDICATIONS/PRE-OPERATIVE  DIAGNOSIS:  Autoimmune hepatitis    : Andrew Clay MD    SURGICAL ASSISTANT:  None    PROSTHETIC DEVICES, TISSUE GRAFTS, TRANSPLANTED ORGANS:  Not applicable    SEDATION: 1% Lidocaine injection 10 ml    PROCEDURE:  Informed consent to perform the procedure was obtained from the patient. The patient was positioned on the edge of the stretcher lying flat in the supine position. Ultrasound was utilized to image the liver. The diaphragm and any major mass lesion or vascular structures within the liver were identified. An appropriate site for liver biopsy was identified. The distance from the surface of the skin to the liver capsule was 3 cm. This area was prepped with betadine and draped in sterile fashion. The skin was infiltrated with 1% lidocaine. The deeper subcutanous tissues and liver capsule overlying the biopsy site were then infiltrated with 1% lidocaine until appropriate anesthesia was obtained. A small incision was made in the skin so the biopsy devise could be easily inserted. A total of 2 passes with the 16 gauge Bard biopsy devise was then made into the liver. Core(s) of liver tissue totaling 4 cm in length were obtained and placed into tissue fixative.   A band aid was placed over the biopsy site. The patient was then repositioned on the right side and transported to the recovery area on the stretcher for routine monitoring until discharge. The specimen was sent to pathology for processing via the normal transport mechanism. SPECIMEN COLLECTED: Liver    INTERVENTIONS:  None    ESTIMATED BLOOD LOSS: Negligible.      COMPLICATIONS:  None    POST-OPERATIVE DIAGNOSIS: Same as Pre-operative Diagnosis      MD Tali Lucas[place] [address].  [phone]  [address] [place] [address]

## 2022-08-05 ENCOUNTER — OFFICE VISIT (OUTPATIENT)
Dept: HEMATOLOGY | Age: 72
End: 2022-08-05
Payer: MEDICARE

## 2022-08-05 VITALS
HEART RATE: 89 BPM | SYSTOLIC BLOOD PRESSURE: 147 MMHG | BODY MASS INDEX: 24 KG/M2 | OXYGEN SATURATION: 97 % | WEIGHT: 130.4 LBS | TEMPERATURE: 97.8 F | DIASTOLIC BLOOD PRESSURE: 92 MMHG | HEIGHT: 62 IN

## 2022-08-05 DIAGNOSIS — K75.4 AUTOIMMUNE HEPATITIS (HCC): Primary | ICD-10-CM

## 2022-08-05 LAB
ALBUMIN SERPL-MCNC: 4.4 G/DL (ref 3.5–5)
ALBUMIN/GLOB SERPL: 1.3 {RATIO} (ref 1.1–2.2)
ALP SERPL-CCNC: 51 U/L (ref 45–117)
ALT SERPL-CCNC: 92 U/L (ref 12–78)
ANION GAP SERPL CALC-SCNC: 8 MMOL/L (ref 5–15)
AST SERPL-CCNC: 46 U/L (ref 15–37)
BASOPHILS # BLD: 0 K/UL (ref 0–0.1)
BASOPHILS NFR BLD: 1 % (ref 0–1)
BILIRUB DIRECT SERPL-MCNC: 0.2 MG/DL (ref 0–0.2)
BILIRUB SERPL-MCNC: 0.6 MG/DL (ref 0.2–1)
BUN SERPL-MCNC: 16 MG/DL (ref 6–20)
BUN/CREAT SERPL: 19 (ref 12–20)
CALCIUM SERPL-MCNC: 10 MG/DL (ref 8.5–10.1)
CHLORIDE SERPL-SCNC: 105 MMOL/L (ref 97–108)
CO2 SERPL-SCNC: 27 MMOL/L (ref 21–32)
CREAT SERPL-MCNC: 0.85 MG/DL (ref 0.55–1.02)
DIFFERENTIAL METHOD BLD: NORMAL
EOSINOPHIL # BLD: 0 K/UL (ref 0–0.4)
EOSINOPHIL NFR BLD: 0 % (ref 0–7)
ERYTHROCYTE [DISTWIDTH] IN BLOOD BY AUTOMATED COUNT: 11.9 % (ref 11.5–14.5)
GLOBULIN SER CALC-MCNC: 3.4 G/DL (ref 2–4)
GLUCOSE SERPL-MCNC: 82 MG/DL (ref 65–100)
HCT VFR BLD AUTO: 44.9 % (ref 35–47)
HGB BLD-MCNC: 14.7 G/DL (ref 11.5–16)
IMM GRANULOCYTES # BLD AUTO: 0 K/UL (ref 0–0.04)
IMM GRANULOCYTES NFR BLD AUTO: 0 % (ref 0–0.5)
LYMPHOCYTES # BLD: 1.4 K/UL (ref 0.8–3.5)
LYMPHOCYTES NFR BLD: 24 % (ref 12–49)
MCH RBC QN AUTO: 31.4 PG (ref 26–34)
MCHC RBC AUTO-ENTMCNC: 32.7 G/DL (ref 30–36.5)
MCV RBC AUTO: 95.9 FL (ref 80–99)
MONOCYTES # BLD: 0.4 K/UL (ref 0–1)
MONOCYTES NFR BLD: 6 % (ref 5–13)
NEUTS SEG # BLD: 4.1 K/UL (ref 1.8–8)
NEUTS SEG NFR BLD: 69 % (ref 32–75)
NRBC # BLD: 0 K/UL (ref 0–0.01)
NRBC BLD-RTO: 0 PER 100 WBC
PLATELET # BLD AUTO: 202 K/UL (ref 150–400)
PMV BLD AUTO: 9.8 FL (ref 8.9–12.9)
POTASSIUM SERPL-SCNC: 4.4 MMOL/L (ref 3.5–5.1)
PROT SERPL-MCNC: 7.8 G/DL (ref 6.4–8.2)
RBC # BLD AUTO: 4.68 M/UL (ref 3.8–5.2)
SODIUM SERPL-SCNC: 140 MMOL/L (ref 136–145)
WBC # BLD AUTO: 6 K/UL (ref 3.6–11)

## 2022-08-05 PROCEDURE — 99215 OFFICE O/P EST HI 40 MIN: CPT | Performed by: INTERNAL MEDICINE

## 2022-08-05 PROCEDURE — 1123F ACP DISCUSS/DSCN MKR DOCD: CPT | Performed by: INTERNAL MEDICINE

## 2022-08-05 PROCEDURE — 3017F COLORECTAL CA SCREEN DOC REV: CPT | Performed by: INTERNAL MEDICINE

## 2022-08-05 PROCEDURE — G8536 NO DOC ELDER MAL SCRN: HCPCS | Performed by: INTERNAL MEDICINE

## 2022-08-05 PROCEDURE — G8400 PT W/DXA NO RESULTS DOC: HCPCS | Performed by: INTERNAL MEDICINE

## 2022-08-05 PROCEDURE — G8420 CALC BMI NORM PARAMETERS: HCPCS | Performed by: INTERNAL MEDICINE

## 2022-08-05 PROCEDURE — 1101F PT FALLS ASSESS-DOCD LE1/YR: CPT | Performed by: INTERNAL MEDICINE

## 2022-08-05 PROCEDURE — G8510 SCR DEP NEG, NO PLAN REQD: HCPCS | Performed by: INTERNAL MEDICINE

## 2022-08-05 PROCEDURE — G0463 HOSPITAL OUTPT CLINIC VISIT: HCPCS | Performed by: INTERNAL MEDICINE

## 2022-08-05 PROCEDURE — G8427 DOCREV CUR MEDS BY ELIG CLIN: HCPCS | Performed by: INTERNAL MEDICINE

## 2022-08-05 PROCEDURE — G8755 DIAS BP > OR = 90: HCPCS | Performed by: INTERNAL MEDICINE

## 2022-08-05 PROCEDURE — 1090F PRES/ABSN URINE INCON ASSESS: CPT | Performed by: INTERNAL MEDICINE

## 2022-08-05 PROCEDURE — G8753 SYS BP > OR = 140: HCPCS | Performed by: INTERNAL MEDICINE

## 2022-08-05 NOTE — PROGRESS NOTES
3340 [facility], MD, [address], [place], [place]      RICKIE Solares ACNP-RACHEAL Abel, [facility] ANN-BC   EMEKA Russell, [place] [facility]       Waleskacorinne Gambino [facility] 136    at [address], [address] [place] [address], [facility].    [phone]    FAX: [address] [place] [address], [address]    [phone]    FAX: [phone]       Patient Care Team:  Sue Guajardo MD as PCP - General (Family Medicine)  Ying Russo MD (Gastroenterology)      Problem List  Date Reviewed: 5/12/2022            Codes Class Noted    Autoimmune hepatitis [facility]) ICD-10-CM: K75.4  ICD-9-CM: 571.42  3/18/2016        Hypertension ICD-10-CM: I10  ICD-9-CM: 401.9  3/18/2016        Hypercholesterolemia ICD-10-CM: E78.00  ICD-9-CM: 272.0  3/18/2016           Tyron Mcmullen is being seen at [address] for management of Autoimmune Hepatitis. The active problem list, all pertinent past medical history, medications, liver histology, radiologic findings and laboratory findings related to the liver disorder were reviewed and discussed with the patient. The patient is a 71 y.o.  female who was found to be elevated liver transaminases into the 1000 range in 12/2015. The patient was switched from Crestor to Lipitor prior to the elevation in liver enzymes. All statins were stopped but the liver enzyme elevation did not resolved. Serologic evaluation for markers of chronic liver disease were positive for KATLYN and ASMA. A liver biopsy in 11/2015 demonstrated severe active hepatitis with bridging necrosis and fibrosis. The patient is now being treated with tacrolimus.     After having normal liver enzymes for years she has had slow progressive rise in liver transaminases which started 1 month following COVID booster in mid 4/2022. She also received a rabies vaccine in 3 and 4/2022 after being bitten by a racoon. The patient underwent a liver biopsy in 8/2022. The procedure was well tolerated. I have personally reviewed and interpreted the liver biopsy slides. This demonstrates mild portal inflammation and stage 3 fibrosis consistent with ongoing controled AIH. The patient does not have any symptoms which could be attributed to the liver disorder. The patient is not experiencing the following symptoms which are commonly seen in this liver disorder:   fatigue,   pain in the right side over the liver,   Myalgias, arthralgias,    The patient completes all daily activities without any functional limitations. ASSESSMENT AND PLAN:  Autoimmune Hepatitis   The diagnosis was based upon laboratory studies, serology, liver biopsy    A liver biopsy performed in 11/2015 shows severe inflammation with interface hepatitis with piecemeal necrosis and   Stage 3 bridging fibrosis. Fibroscan performed in 10/2019 demonstrated a liver stiffness of 6.5 consistent and CAP score 268 suggesting fatty liver and stage 1 fibrosis. The patient was initially treated with prednisone 30 mg every day and azathioprine 50 mg every day   The liver enzymes declined from the 1000 range to about 300-400 range and then stabilized. The dose of azathioprine was increased but this had little effect. The patient was started on tacrolimus in 10/2017. The liver enzymes declined in to the normal range. Prednisone was taper and stopped in 6/2017. Azathioprine was stopped in 4/2018. Liver enzymes have remained normal from late 2017 until 5/2022. This was about 1 month following COVID booster. Liver enzymes were monitored but continued to drift upward into the 100 range in 6/2022.     A repeat liver biopsy in 8/2022 demonstrated mild portal inflammation, mild interface activity, no PMN and stage 3 bridging fibrosis. Suspect COVID vaccine initiated an immune response with elevation in liver enzymes. The liver enzymes looked like they have peaked and are now coming down. Since the biopsy shows mild ongoing inflammation will continue tacrolimus at the current dose and see if the lvier enzymes come down to normal.    If they do not there are 2 options. Pulse with prednisone 20 mg every day until the liver transaminases are normal and then taper or increase the dose of tacrolimus and then taper this down over time. Skin Cancer  She developed a SCCA for the face in 2021. She has regular follow-up with dermatology every 6 months. Screening for Hepatocellular Carcinoma  HCC screening is not necessary if the patient has no evidence of cirrhosis. Treatment of other medical problems in patients with chronic liver disease  There are no contraindications for the patient to take most medications that are necessary for treatment of other medical issues. Counseling for alcohol in patients with chronic liver disease  The patient does not consume any significant amount of alcohol. Vaccinations   Vaccination for viral hepatitis A is not needed. The patient has serologic evidence of prior exposure or vaccination with immunity. The patient has received COVID vaccine in 2/2021 and the booster dose in 8/2021. She had COVID infection in 1/2022 and is fully recovered. She has had 2nd booster 4/2022 2 months prior to the increase in liver enzymes. Routine vaccinations against other bacterial and viral agents can be performed as indicated. Annual flu vaccination should be administered if indicated. ALLERGIES  No Known Allergies    MEDICATIONS  Current Outpatient Medications   Medication Sig    cholecalciferol (VITAMIN D3) 25 mcg (1,000 unit) cap Take 2,000 Units by mouth daily.     tacrolimus (PROGRAF) 1 mg capsule TAKE 1 CAPSULE BY MOUTH 2 TIMES A DAY    rosuvastatin (CRESTOR) 10 mg tablet Take 10 mg by mouth daily. lisinopril (PRINIVIL, ZESTRIL) 20 mg tablet Take 20 mg by mouth daily. diphenhydrAMINE (BENADRYL) 25 mg capsule Take 25 mg by mouth nightly as needed. No current facility-administered medications for this visit. SYSTEM REVIEW NOT RELATED TO LIVER DISEASE OR REVIEWED ABOVE:  Constitution systems: Negative for fever, chills, weight gain, weight loss. Eyes: Negative for visual changes. ENT: Negative for sore throat, painful swallowing. Respiratory: Negative for cough, hemoptysis, SOB. Cardiology: Negative for chest pain, palpitations. GI:  Negative for constipation or diarrhea. : Negative for urinary frequency, dysuria, hematuria, nocturia. Skin: Negative for rash. Hematology: Negative for easy bruising, blood clots. Musculo-skeletal: Negative for back pain, muscle pain, weakness. Neurologic: Negative for headaches, dizziness, vertigo, memory problems not related to HE. Psychology: Negative for anxiety, depression. FAMILY HISTORY:  The father  at age 80 years. The mother is alive and healthy in her late 80s. There is no family history of liver disease. There is no family history of immune disorders. SOCIAL HISTORY:  The patient is . The patient has 3 children, and 6 grandchildren. The patient has never used tobacco products. The patient consumes 1 alcoholic beverages per day historically. She discontinued alcohol with recognition of liver problem, but states that she has resumed more regular (daily) intake in Spring 2020. The patient used to work in banking. The patient retired in . PHYSICAL EXAMINATION PERFORMED BY [facility]:  VS: Not performed   General: No acute distress. Eyes: Sclera anicteric. ENT: No oral lesions. Skin: No rashes. spider angiomata. No jaundice.     Abdomen: No obvious distention suggesting ascites. Extremities: No edema. No muscle wasting. Neurologic: Alert and oriented. Cranial nerves grossly intact. LABORATORY STUDIES:  Liver [place] [address] & Units 8/5/2022 6/29/2022   WBC 3.6 - 11.0 K/uL 6.0 4.6   ANC 1.8 - 8.0 K/UL 4.1    HGB 11.5 - 16.0 g/dL 14.7 14.5    - 400 K/uL 202 209   AST 15 - 37 U/L 46 (H) 143 (H)   ALT 12 - 78 U/L 92 (H) 368 (H)   Alk Phos 45 - 117 U/L 51 52   Bili, Total 0.2 - 1.0 MG/DL 0.6 0.7   Bili, Direct 0.0 - 0.2 MG/DL 0.2 0.2   Albumin 3.5 - 5.0 g/dL 4.4 4.1   BUN 6 - 20 MG/DL 16 12   Creat 0.55 - 1.02 MG/DL 0.85 0.75   Na 136 - 145 mmol/L 140 137   K 3.5 - 5.1 mmol/L 4.4 4.7   Cl 97 - 108 mmol/L 105 99   CO2 21 - 32 mmol/L 27 23   Glucose 65 - 100 mg/dL 82 98     Liver Virology and Transplant Immune Suppression Latest Ref Rng & Units 6/29/2022   Tacrolimus Level 2.0 - 20.0 ng/mL 3.1     Liver Virology and Transplant Immune Suppression Latest Ref Rng & Units 6/10/2022   Tacrolimus Level 2.0 - 20.0 ng/mL 3.1     Liver Virology and Transplant Immune Suppression Latest Ref Rng & Units 2/21/2022   Tacrolimus Level 2.0 - 20.0 ng/mL 3.2     SEROLOGIES:  Serologies Latest Ref Rng 3/18/2016   Hep A Ab, Total Negative Positive (A)   Hep B Surface Ag Negative Negative   KATLYN, IFA  See patterns   KATLYN Pattern  1:1280 (H)   C-ANCA Neg:<1:20 titer <1:20   P-ANCA Neg:<1:20 titer <1:20   ANCA Neg:<1:20 titer <1:20   ASMCA 0 - 19 Units 71 (H)   M2 Ab 0.0 - 20.0 Units 11.4   Anti-SLA 0.0 - 20.0 units 1.0   LKM 0.0 - 20.0 Units 1.4     10/2015. Anti-HCV negative, KATLYN negative, ASMA positive    LIVER HISTOLOGY:  11/2015. Slides reviewed by MLS. Severe active hepatitis with PMN, bridging necrosis and bridging fibrosis. 4/2018. FibroScan performed at [address]. EkPa was 10.9. Suggested fibrosis level is F3. CAP score is 324, suggestive of steatosis. 10/2019. FibroScan performed at [address]. EkPa was 6.5. Suggested fibrosis level is F0-1. CAP score 268, this is consistent with steatosis. 8/2022. Slides reviewed by MLS. Mild portal inflammation, with Mild interface hepatitis, with No PMN. Bile ducts are normal.  No lobular inflammation. No steatosis. Gaston stage 4 fibrosis. Metavir stage 3 fibrosis. Knodell score (1013). ENDOSCOPIC PROCEDURES:  Not available or performed    RADIOLOGY:  Not available or performed    OTHER TESTING:  Not available or performed    FOLLOW-UP:  All of the issues listed above in the Assessment and Plan were discussed with the patient. All questions were answered. The patient expressed a clear understanding of the above. [address] [place] [address] in 3 months for routine monitoring.       Jjoo Tapia MD  [facility] [address] [place] [address], [address] [place] [address].  [phone]  [address] [place] [address]

## 2022-08-05 NOTE — PROGRESS NOTES
Identified pt with two pt identifiers(name and ). Reviewed record in preparation for visit and have obtained necessary documentation. Chief Complaint   Patient presents with    Hepatitis C     2wks follow up to lbx with MLS      Vitals:    22 1148   BP: (!) 147/92   Pulse: 89   Temp: 97.8 °F (36.6 °C)   TempSrc: Temporal   SpO2: 97%   Weight: 130 lb 6.4 oz (59.1 kg)   Height: 5' 2\" (1.575 m)   PainSc:   0 - No pain       Health Maintenance Review: Patient reminded of \"due or due soon\" health maintenance. I have asked the patient to contact his/her primary care provider (PCP) for follow-up on his/her health maintenance. Coordination of Care Questionnaire:  :   1) Have you been to an emergency room, urgent care, or hospitalized since your last visit? If yes, where when, and reason for visit? no       2. Have seen or consulted any other health care provider since your last visit? If yes, where when, and reason for visit? NO      Patient is accompanied by self I have received verbal consent from Amy Cano to discuss any/all medical information while they are present in the room.

## 2022-08-05 NOTE — Clinical Note
8/30/2022    Patient: Maycol Strauss   YOB: 1950   Date of Visit: 8/5/2022     Sera Shin MD  [address] [facility] [address]  Via Fax: [phone]    Dear Sera Shin MD,      Thank you for referring Ms. Maycol Strauss to [facility] Erin Carvajal for evaluation. My notes for this consultation are attached. If you have questions, please do not hesitate to call me. I look forward to following your patient along with you.       Sincerely,    Yara Kothari MD

## 2022-08-30 ENCOUNTER — TELEPHONE (OUTPATIENT)
Dept: HEMATOLOGY | Age: 72
End: 2022-08-30

## 2022-08-30 NOTE — TELEPHONE ENCOUNTER
----- Message from Phil Guillen MD sent at 8/30/2022  5:29 AM EDT -----  Regarding: Tell her liver enzymes comeing down. Stay on same dose of Tacrolimus. SOWMYA Crowley 3 months      [email or web address] Spoke w/patient concerning above message from Andrew. Liver enzymes coming down and maintain same does of tacrolimus. Keep follow up appt 10/2022. Patient verbalize understanding. (KF)

## 2022-09-19 ENCOUNTER — TELEPHONE (OUTPATIENT)
Dept: HEMATOLOGY | Age: 72
End: 2022-09-19

## 2022-09-19 DIAGNOSIS — K75.4 AUTOIMMUNE HEPATITIS (HCC): Primary | ICD-10-CM

## 2022-09-26 DIAGNOSIS — K75.4 AUTOIMMUNE HEPATITIS (HCC): Primary | ICD-10-CM

## 2022-09-26 DIAGNOSIS — R10.11 RUQ ABDOMINAL PAIN: ICD-10-CM

## 2022-09-28 ENCOUNTER — HOSPITAL ENCOUNTER (OUTPATIENT)
Dept: ULTRASOUND IMAGING | Age: 72
Discharge: HOME | End: 2022-09-28
Attending: PHYSICIAN ASSISTANT
Payer: MEDICARE

## 2022-09-28 DIAGNOSIS — R10.11 RUQ ABDOMINAL PAIN: ICD-10-CM

## 2022-09-28 DIAGNOSIS — K75.4 AUTOIMMUNE HEPATITIS (HCC): ICD-10-CM

## 2022-09-28 PROCEDURE — 76700 US EXAM ABDOM COMPLETE: CPT

## 2022-09-30 NOTE — PROGRESS NOTES
Pt notified of findings, rather large hepatic cyst.? Source for pain/pressure. Will call for hard copy of the past 2015 ultrasound report for comparison.

## 2022-10-04 ENCOUNTER — TELEPHONE (OUTPATIENT)
Dept: HEMATOLOGY | Age: 72
End: 2022-10-04

## 2022-10-04 NOTE — TELEPHONE ENCOUNTER
Fax request for 2015/2016 abdominal US sent to PCP, per CHRISTOPH Stack    Fax confirmation received.

## 2022-10-05 ENCOUNTER — TELEPHONE (OUTPATIENT)
Dept: HEMATOLOGY | Age: 72
End: 2022-10-05

## 2022-10-05 LAB
ALBUMIN SERPL-MCNC: 4.9 G/DL (ref 3.7–4.7)
ALP SERPL-CCNC: 56 IU/L (ref 44–121)
ALT SERPL-CCNC: 122 IU/L (ref 0–32)
AST SERPL-CCNC: 79 IU/L (ref 0–40)
BILIRUB DIRECT SERPL-MCNC: 0.13 MG/DL (ref 0–0.4)
BILIRUB SERPL-MCNC: 0.4 MG/DL (ref 0–1.2)
BUN SERPL-MCNC: 17 MG/DL (ref 8–27)
BUN/CREAT SERPL: 19 (ref 12–28)
CALCIUM SERPL-MCNC: 9.8 MG/DL (ref 8.7–10.3)
CHLORIDE SERPL-SCNC: 103 MMOL/L (ref 96–106)
CO2 SERPL-SCNC: 23 MMOL/L (ref 20–29)
CREAT SERPL-MCNC: 0.89 MG/DL (ref 0.57–1)
EGFR: 69 ML/MIN/1.73
ERYTHROCYTE [DISTWIDTH] IN BLOOD BY AUTOMATED COUNT: 12 % (ref 11.7–15.4)
GLUCOSE SERPL-MCNC: 96 MG/DL (ref 70–99)
HCT VFR BLD AUTO: 46.5 % (ref 34–46.6)
HGB BLD-MCNC: 15.4 G/DL (ref 11.1–15.9)
MCH RBC QN AUTO: 30.4 PG (ref 26.6–33)
MCHC RBC AUTO-ENTMCNC: 33.1 G/DL (ref 31.5–35.7)
MCV RBC AUTO: 92 FL (ref 79–97)
PLATELET # BLD AUTO: 205 X10E3/UL (ref 150–450)
POTASSIUM SERPL-SCNC: 4.6 MMOL/L (ref 3.5–5.2)
PROT SERPL-MCNC: 7.4 G/DL (ref 6–8.5)
RBC # BLD AUTO: 5.06 X10E6/UL (ref 3.77–5.28)
SODIUM SERPL-SCNC: 140 MMOL/L (ref 134–144)
WBC # BLD AUTO: 4.8 X10E3/UL (ref 3.4–10.8)

## 2022-10-05 NOTE — TELEPHONE ENCOUNTER
Call placed to pcp office to request patient's 2015 abdominal ultrasound report, per CHRISTOPH Shetty    Report received and placed in provider's box for review.

## 2022-10-06 DIAGNOSIS — K76.89 HEPATIC CYST: ICD-10-CM

## 2022-10-06 DIAGNOSIS — K75.4 AUTOIMMUNE HEPATITIS (HCC): Primary | ICD-10-CM

## 2022-10-07 NOTE — PROGRESS NOTES
Reviewed labs and US cyst findings with Dr Umair Winters. Will defer changes in immunosuppression for now pending any possible surgical management of large hepatic cyst.  She has been having increased abdominal pain and we will have her obtain CT to assess cyst and feasibility/indication for surgical management with a consult to Dr Alverto Mancia. Patient advised of this plan and referral made. Cyst seen to have enlarged on interval from 2015 from 5.6 cm to present 7.9 cm. Area L Indication Text: Tumors in this location are included in Area L (trunk and extremities).  Mohs surgery is indicated for larger tumors, or tumors with aggressive histologic features, in these anatomic locations.

## 2022-10-13 ENCOUNTER — HOSPITAL ENCOUNTER (OUTPATIENT)
Dept: CT IMAGING | Age: 72
Discharge: HOME | End: 2022-10-13
Attending: PHYSICIAN ASSISTANT
Payer: MEDICARE

## 2022-10-13 DIAGNOSIS — K75.4 AUTOIMMUNE HEPATITIS (HCC): ICD-10-CM

## 2022-10-13 DIAGNOSIS — K76.89 HEPATIC CYST: ICD-10-CM

## 2022-10-13 PROCEDURE — 74170 CT ABD WO CNTRST FLWD CNTRST: CPT

## 2022-10-13 PROCEDURE — 74011000636 HC RX REV CODE- 636: Performed by: PHYSICIAN ASSISTANT

## 2022-10-13 RX ADMIN — IOPAMIDOL 100 ML: 755 INJECTION, SOLUTION INTRAVENOUS at 10:43

## 2022-10-17 NOTE — PROGRESS NOTES
Pt notified via MD letter of findings, will have her followup with surgical associates for evaluation and treatment options if indicated.

## 2022-10-18 ENCOUNTER — TELEPHONE (OUTPATIENT)
Dept: HEMATOLOGY | Age: 72
End: 2022-10-18

## 2022-10-18 NOTE — TELEPHONE ENCOUNTER
Call placed to Dr. Maryam Mary office to confirm if patient can be scheduled asap. Per Sugar Alvarado, there was a note indicating that the patient would be the one to follow up and schedule. [facility] to advise to call the office.

## 2022-10-18 NOTE — TELEPHONE ENCOUNTER
Message left on vm advising patient to contact Dr. Rosy Vega office (general surgeon @[phone]) to schedule an appointment, per Dr. Rosy Vega office.

## 2022-10-20 ENCOUNTER — OFFICE VISIT (OUTPATIENT)
Dept: SURGERY | Age: 72
End: 2022-10-20
Payer: MEDICARE

## 2022-10-20 VITALS
BODY MASS INDEX: 24.11 KG/M2 | WEIGHT: 131 LBS | HEART RATE: 81 BPM | DIASTOLIC BLOOD PRESSURE: 74 MMHG | OXYGEN SATURATION: 98 % | RESPIRATION RATE: 18 BRPM | HEIGHT: 62 IN | SYSTOLIC BLOOD PRESSURE: 126 MMHG

## 2022-10-20 DIAGNOSIS — K76.89 HEPATIC CYST: Primary | ICD-10-CM

## 2022-10-20 PROCEDURE — G8536 NO DOC ELDER MAL SCRN: HCPCS | Performed by: SURGERY

## 2022-10-20 PROCEDURE — 1123F ACP DISCUSS/DSCN MKR DOCD: CPT | Performed by: SURGERY

## 2022-10-20 PROCEDURE — 1101F PT FALLS ASSESS-DOCD LE1/YR: CPT | Performed by: SURGERY

## 2022-10-20 PROCEDURE — 3017F COLORECTAL CA SCREEN DOC REV: CPT | Performed by: SURGERY

## 2022-10-20 PROCEDURE — 3074F SYST BP LT 130 MM HG: CPT | Performed by: SURGERY

## 2022-10-20 PROCEDURE — 3078F DIAST BP <80 MM HG: CPT | Performed by: SURGERY

## 2022-10-20 PROCEDURE — G8427 DOCREV CUR MEDS BY ELIG CLIN: HCPCS | Performed by: SURGERY

## 2022-10-20 PROCEDURE — G8400 PT W/DXA NO RESULTS DOC: HCPCS | Performed by: SURGERY

## 2022-10-20 PROCEDURE — 99203 OFFICE O/P NEW LOW 30 MIN: CPT | Performed by: SURGERY

## 2022-10-20 PROCEDURE — 1090F PRES/ABSN URINE INCON ASSESS: CPT | Performed by: SURGERY

## 2022-10-20 PROCEDURE — G8510 SCR DEP NEG, NO PLAN REQD: HCPCS | Performed by: SURGERY

## 2022-10-20 PROCEDURE — G8752 SYS BP LESS 140: HCPCS | Performed by: SURGERY

## 2022-10-20 PROCEDURE — G8420 CALC BMI NORM PARAMETERS: HCPCS | Performed by: SURGERY

## 2022-10-20 PROCEDURE — G8754 DIAS BP LESS 90: HCPCS | Performed by: SURGERY

## 2022-10-20 NOTE — PROGRESS NOTES
Identified pt with two pt identifiers (name and ). Reviewed chart in preparation for visit and have obtained necessary documentation. Todd Floyd is a 71 y.o. female  Chief Complaint   Patient presents with    New Patient    Cyst     Hepatic Cyst      Visit Vitals  /74 (BP 1 Location: Right arm, BP Patient Position: Sitting, BP Cuff Size: Adult)   Pulse 81   Resp 18   Ht 5' 2\" (1.575 m)   Wt 131 lb (59.4 kg)   SpO2 98%   BMI 23.96 kg/m²       1. Have you been to the ER, urgent care clinic since your last visit? Hospitalized since your last visit? No    2. Have you seen or consulted any other health care providers outside of the [address] [place] [address] since your last visit? Include any pap smears or colon screening.  No

## 2022-10-20 NOTE — LETTER
10/28/2022    Patient: Roma Fernandez   YOB: 1950   Date of Visit: 10/20/2022     Patricia Witt MD  [address] [facility] [address]  Via Fax: [phone]    Dear Patricia Witt MD,      Thank you for referring Ms. Roma Fernandez to [address] for evaluation. My notes for this consultation are attached. If you have questions, please do not hesitate to call me. I look forward to following your patient along with you.       Sincerely,    Deloris Cloud MD

## 2022-10-28 PROBLEM — K76.89 HEPATIC CYST: Status: ACTIVE | Noted: 2022-10-28

## 2022-10-28 NOTE — PROGRESS NOTES
[facility] History and Physical    Chief Complaint: Hepatic cyst    History of Present Illness:      Manan Wolff is a 71 y.o. female who was kindly referred by Dr. Nita Coleman for evaluation of a large hepatic cyst.  The patient states that she was diagnosed with autoimmune hepatitis 7 years ago. This has been managed with tacrolimus immunosuppression. Her LFTs did increase in June and she underwent a biopsy of her liver at that time. During the biopsy on ultrasound a large cyst was noted. She had follow-up imaging that showed the cyst had increased in size since 2007. She does have some minor right upper quadrant discomfort. No changes with food intake. She describes the pain is not severe and she typically does not take any medication for this. Additionally noted on the CT was a short segment dissection of the SMA without flow limitation or occlusion.     Past Medical History:   Diagnosis Date    Arthritis     midfoot - bilateral - cortisone shots twice a yr    Autoimmune disease ([address]     Hypercholesterolemia     Hypertension     Ill-defined condition     elevated cholesterol    Liver disease     autoimmune hepatitis       Past Surgical History:   Procedure Laterality Date    HX CATARACT REMOVAL Left 12/12/2019    Dr. Durán Necessary       Social History     Socioeconomic History    Marital status:      Spouse name: Not on file    Number of children: Not on file    Years of education: Not on file    Highest education level: Not on file   Occupational History    Not on file   Tobacco Use    Smoking status: Never    Smokeless tobacco: Never   Vaping Use    Vaping Use: Never used   Substance and Sexual Activity    Alcohol use: Not Currently     Alcohol/week: 0.0 standard drinks     Comment: glass of wine daily/none in last 6 weeks per pt on 7/12/22    Drug use: No    Sexual activity: Not on file   Other Topics Concern    Not on file   Social History Narrative    Not on file     Social Determinants of Health     Financial Resource Strain: Not on file   Food Insecurity: Not on file   Transportation Needs: Not on file   Physical Activity: Not on file   Stress: Not on file   Social Connections: Not on file   Intimate Partner Violence: Not on file   Housing Stability: Not on file       Family History   Problem Relation Age of Onset    Macular Degen Mother     Hearing Impairment Mother          Current Outpatient Medications:     cholecalciferol (VITAMIN D3) 25 mcg (1,000 unit) cap, Take 2,000 Units by mouth daily. , Disp: , Rfl:     tacrolimus (PROGRAF) 1 mg capsule, TAKE 1 CAPSULE BY MOUTH 2 TIMES A DAY, Disp: 180 Capsule, Rfl: 3    rosuvastatin (CRESTOR) 10 mg tablet, Take 10 mg by mouth daily. , Disp: , Rfl:     lisinopril (PRINIVIL, ZESTRIL) 20 mg tablet, Take 20 mg by mouth daily. , Disp: , Rfl:     diphenhydrAMINE (BENADRYL) 25 mg capsule, Take 25 mg by mouth nightly as needed. , Disp: , Rfl:     No Known Allergies    ROS   Constitutional: Negative  Ears, Nose, Mouth, Throat, and Face: Negative  Respiratory: Negative  Cardiovascular: Negative  Gastrointestinal: As in HPI  Genitourinary: Negative  Integument/Breast: Negative  Hematologic/Lymphatic: Negative  Behavioral/Psychiatric: Negative  Allergic/Immunologic: Negative      Physical Exam:     Visit Vitals  /74 (BP 1 Location: Right arm, BP Patient Position: Sitting, BP Cuff Size: Adult)   Pulse 81   Resp 18   Ht 5' 2\" (1.575 m)   Wt 131 lb (59.4 kg)   SpO2 98%   BMI 23.96 kg/m²       General - alert and oriented, no apparent distress  HEENT - NC/AT. No scleral icterus  Pulm - CTAB, normal inspiratory effort  CV - RRR, no M/R/G  Abd - soft, NT, ND. No palpable masses or organomegaly. Ext - warm, well perfused, no edema  Lymphatics - no cervical, supraclavicular or inguinal adenopathy noted.    Skin - supple, no rashes  Psychiatric - normal affect, good mood    Labs  Lab Results   Component Value Date/Time    WBC 4.8 10/04/2022 09:27 AM    HGB 15.4 10/04/2022 09:27 AM    HCT 46.5 10/04/2022 09:27 AM    PLATELET 599 12/22/7949 09:27 AM    MCV 92 10/04/2022 09:27 AM     Lab Results   Component Value Date/Time    Sodium 140 10/04/2022 09:27 AM    Potassium 4.6 10/04/2022 09:27 AM    Chloride 103 10/04/2022 09:27 AM    CO2 23 10/04/2022 09:27 AM    Anion gap 8 08/05/2022 01:32 PM    Glucose 96 10/04/2022 09:27 AM    BUN 17 10/04/2022 09:27 AM    Creatinine 0.89 10/04/2022 09:27 AM    BUN/Creatinine ratio 19 10/04/2022 09:27 AM    GFR est AA >60 08/05/2022 01:32 PM    GFR est non-AA >60 08/05/2022 01:32 PM    Calcium 9.8 10/04/2022 09:27 AM    Bilirubin, total 0.4 10/04/2022 09:27 AM    Alk. phosphatase 56 10/04/2022 09:27 AM    Protein, total 7.4 10/04/2022 09:27 AM    Albumin 4.9 (H) 10/04/2022 09:27 AM    Globulin 3.4 08/05/2022 01:32 PM    A-G Ratio 1.3 08/05/2022 01:32 PM    ALT (SGPT) 122 (H) 10/04/2022 09:27 AM    AST (SGOT) 79 (H) 10/04/2022 09:27 AM         Imaging  CT Results (most recent):  Results from Hospital Encounter encounter on 10/13/22    CT ABD W WO CONT    Narrative  EXAM: CT ABD W WO CONT    INDICATION: assess RUQ pain and parameters of known hepatic cyst    COMPARISON: Abdominal ultrasound September 28, 2022. CONTRAST: 100 mL of Isovue-370. TECHNIQUE:  Multislice helical CT was performed from the diaphragm to the iliac crest prior  to and during rapid bolus intravenous contrast administration. CT acquisitions  were performed in the unenhanced, arterial, portal venous, and delayed phases. Oral contrast was not administered. Contiguous 5 mm axial images were  reconstructed and lung and soft tissue windows were generated. Coronal and  sagittal reformations were generated. CT dose reduction was achieved through  use of a standardized protocol tailored for this examination and automatic  exposure control for dose modulation. FINDINGS:  The visualized portions of the lung bases are clear.     The liver measures 18.5 cm longitudinal. There is a cyst of the RIGHT hepatic  lobe segment 6 which measures 9.2 x 7.6 cm on series 5, image 35. It measures  8.3 cm craniocaudal on coronal series 796, slice 59. The cyst measures simple  fluid density with no enhancing components. The wall is thin and there is a  single, thin septation observed along the anterior margin. No mural nodularity  is identified. The cyst abuts the main portal vein and the RIGHT portal vein  without narrowing or occlusion. No abnormally enhancing liver lesions are identified. The portal veins and the  hepatic veins are patent. There is no intrahepatic biliary ductal dilatation. The spleen, pancreas, and adrenal glands image normally. There is no pancreas  ductal dilatation. The spleen size is normal. The kidneys enhance symmetrically. No calcified renal stone is seen. There is no hydronephrosis. There is a simple  cyst of the upper pole LEFT kidney measuring 4.8 cm diameter. No complex cystic  or solid renal mass is identified. The stomach, included small bowel, and included large bowel are unremarkable. There is no abdominal adenopathy. The abdominal aorta is normal in caliber. The  distal aorta measures 1.7 cm AP. There is a short segment dissection of the SMA  demonstrated on coronal series 849, slice 48 without occlusion. No bony lytic or blastic lesion is seen. There is multilevel degenerative disc  disease of the lumbar spine. Impression  1. There is a simple cyst of the RIGHT hepatic lobe segment 6 measuring 9.2 x  7.6 x 8.3 cm. No abnormally enhancing liver mass is seen. 2. There is a 5 cm simple cyst of the LEFT kidney upper pole which requires no  further follow-up. 3. There is a short 1.3 cm segment dissection of the SMA with no occlusion or  stenosis.       I have reviewed and agree with all of the pertinent images    Assessment:     Rochelle Torres is a 71 y.o. female with a moderately large hepatic cysts on the posterior aspect of the right lobe. This appears simple without any concern for malignancy. She also has a short segment dissection of the SMA. Recommendations:     1. The patient's symptoms are fairly minor. We discussed that if she wanted to be sure the symptoms are related to her cyst, that this could be aspirated by interventional radiology to see if her pain improves. She could then undergo a subsequent surgical procedure (cyst marsupialization) for more definitive cyst management if her symptoms recurred after drainage. She prefers this over upfront surgical intervention. I recommended a referral to vascular surgery regarding the short segment SMA dissection. She states that she has family friends who are vascular surgeons and will reach out to them for an appointment. I will see her back as needed. 30 mins of time was spent with the patient of which > 50% of the time involved face-to-face counseling of the patient regarding the proposed treatment plan.       Kalina Mcintyre MD  10/20/2022    CC: MD Dr. Sadiq De La Garza

## 2022-10-31 ENCOUNTER — VIRTUAL VISIT (OUTPATIENT)
Dept: HEMATOLOGY | Age: 72
End: 2022-10-31
Payer: MEDICARE

## 2022-10-31 DIAGNOSIS — K75.4 AUTOIMMUNE HEPATITIS (HCC): Primary | ICD-10-CM

## 2022-10-31 PROCEDURE — G8428 CUR MEDS NOT DOCUMENT: HCPCS | Performed by: PHYSICIAN ASSISTANT

## 2022-10-31 PROCEDURE — G0463 HOSPITAL OUTPT CLINIC VISIT: HCPCS | Performed by: PHYSICIAN ASSISTANT

## 2022-10-31 PROCEDURE — 1101F PT FALLS ASSESS-DOCD LE1/YR: CPT | Performed by: PHYSICIAN ASSISTANT

## 2022-10-31 PROCEDURE — 3017F COLORECTAL CA SCREEN DOC REV: CPT | Performed by: PHYSICIAN ASSISTANT

## 2022-10-31 PROCEDURE — 1123F ACP DISCUSS/DSCN MKR DOCD: CPT | Performed by: PHYSICIAN ASSISTANT

## 2022-10-31 PROCEDURE — G8400 PT W/DXA NO RESULTS DOC: HCPCS | Performed by: PHYSICIAN ASSISTANT

## 2022-10-31 PROCEDURE — 99214 OFFICE O/P EST MOD 30 MIN: CPT | Performed by: PHYSICIAN ASSISTANT

## 2022-10-31 PROCEDURE — 1090F PRES/ABSN URINE INCON ASSESS: CPT | Performed by: PHYSICIAN ASSISTANT

## 2022-10-31 PROCEDURE — G8756 NO BP MEASURE DOC: HCPCS | Performed by: PHYSICIAN ASSISTANT

## 2022-10-31 PROCEDURE — G8432 DEP SCR NOT DOC, RNG: HCPCS | Performed by: PHYSICIAN ASSISTANT

## 2022-10-31 NOTE — PROGRESS NOTES
Identified pt with two pt identifiers(name and ). Reviewed record in preparation for visit and have obtained necessary documentation. No chief complaint on file. There were no vitals filed for this visit. Health Maintenance Review: Patient reminded of \"due or due soon\" health maintenance. I have asked the patient to contact his/her primary care provider (PCP) for follow-up on his/her health maintenance. Coordination of Care Questionnaire:  :   1) Have you been to an emergency room, urgent care, or hospitalized since your last visit? If yes, where when, and reason for visit? no       2. Have seen or consulted any other health care provider since your last visit? If yes, where when, and reason for visit? YES, Dr Elvira Mccoy 10/20/22      Patient is accompanied by self I have received verbal consent from Berry Hodge to discuss any/all medical information while they are present in the room.

## 2022-10-31 NOTE — PROGRESS NOTES
[facility], Gordon ELLER, Sivakumar Kristofer Suzanne, [place]      RICKIE Yoon, [facility]   Logan Al, [facility]   Tad Rosales, FNP-C   Joseph Steven FNP-C    Adriana Orozco, [facility]       Waleska Gambino Suman De Mckeon [address]    at [address], [facility] Kenny Cedeno  22.    [phone]    FAX: [address] [place] [address], [address]    [phone]    FAX: [phone]       Patient Care Team:  Sunday Ward MD as PCP - General (Family Medicine)  Rodolfo Mathis MD (Gastroenterology)      Problem List  Date Reviewed: 10/28/2022            Codes Class Noted    Hepatic cyst ICD-10-CM: K76.89  ICD-9-CM: 573.8  10/28/2022        Autoimmune hepatitis [facility]) ICD-10-CM: K75.4  ICD-9-CM: 571.42  3/18/2016        Hypertension ICD-10-CM: I10  ICD-9-CM: 401.9  3/18/2016        Hypercholesterolemia ICD-10-CM: E78.00  ICD-9-CM: 272.0  3/18/2016           Belkys Cantu is being seen at [address] for management of Autoimmune Hepatitis. The active problem list, all pertinent past medical history, medications, liver histology, radiologic findings and laboratory findings related to the liver disorder were reviewed and discussed with the patient. The patient is a 71 y.o.  female who was found to be elevated liver transaminases into the 1000 range in 12/2015. The patient was switched from Crestor to Lipitor prior to the elevation in liver enzymes. All statins were stopped but the liver enzyme elevation did not resolved. Serologic evaluation for markers of chronic liver disease were positive for KATLYN and ASMA.     A liver biopsy in 11/2015 demonstrated severe active hepatitis with bridging necrosis and fibrosis. The patient is now being treated with tacrolimus at a dose of 1 mg bid. After having normal liver enzymes for years she has had slow progressive rise in liver transaminases which started 1 month following COVID booster in mid 4/2022. She also received a rabies vaccine in 3 and 4/2022 after being bitten by a racoon. The patient underwent a liver biopsy in 8/2022. This demonstrates mild portal inflammation and stage 3 fibrosis consistent with ongoing controlled AIH. Her liver enzymes were noted to be coming down in 8/2022, but have risen modestly on latest labs in 10/2022. At the time of the liver biopsy, she was noted to have a moderately large hepatic cyst on ultrasound. She had contacted me in late 9/2022 about abdominal pain and ultrasound was performed showing a ~8 cm right lobe hepatic cyst.  This was followed with CT scan showing again dimensions of cyst to be 9 x 8 cm. 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 She has since met with Dr Garvin Merlin in surgery for evaluation. He has offered IR drainage of the cyst to identify if that relieves her pressure/pain and if so, surgical marsupialization could be considered. She has relayed that the pain is not so severe at this time and she would like to defer drainage at present. She is due to see Dr Shirley Valdivia next week with vascular surgery for review of the SMA dissection. This is not likely to be the cause of the pressure in the RUQ and she has had no crampy abdominal pain symptoms as might be expected. She has had flu shot a few weeks ago and has not had the bivalent COVID vaccination at this point. The patient is not experiencing the following symptoms which are commonly seen in this liver disorder:   fatigue, myalgias, arthralgias. She continues to tolerate the tacrolimus reasonably well.      The patient completes all daily activities without any functional limitations. ASSESSMENT AND PLAN:  Autoimmune Hepatitis   The diagnosis was based upon laboratory studies, serology, liver biopsy. A liver biopsy performed in 11/2015 shows severe inflammation with interface hepatitis with piecemeal necrosis and Stage 3 bridging fibrosis. Fibroscan performed in 10/2019 demonstrated a liver stiffness of 6.5 consistent and CAP score 268 suggesting fatty liver and stage 1 fibrosis. The patient was initially treated with prednisone 30 mg every day and azathioprine 50 mg every day   The liver enzymes declined from the 1000 range to about 300-400 range and then stabilized. The dose of azathioprine was increased but this had little effect. The patient was started on tacrolimus in 10/2017. The liver enzymes declined in to the normal range. Prednisone was taper and stopped in 6/2017. Azathioprine was stopped in 4/2018. Liver enzymes have remained normal from late 2017 until 5/2022. This was about 1 month following COVID booster and  3 months following rabies vaccination. Liver enzymes were monitored but continued to drift upward into the 100 range in 6/2022. A repeat liver biopsy in 8/2022 demonstrated mild portal inflammation, mild interface activity, no PMN and stage 3 bridging fibrosis. Suspect COVID vaccine (or possibly rabies) initiated an immune response with elevation in liver enzymes. The liver enzymes remain modestly elevated on recent labs. Since the biopsy shows mild ongoing inflammation will continue tacrolimus at the current dose and see if the lvier enzymes come down to normal.    If they do not there are 2 options. Pulse with prednisone 20 mg every day until the liver transaminases are normal and then taper or increase the dose of tacrolimus and then taper this down over time. We will defer changes in dosing pending evaluation with vascular surgery and her monitoring of cyst pressure/pain symptoms.  I have asked that she obtain labs every 4-6 weeks to monitor and we can decide on appropriate adjustments. Hepatic cyst  She has a relatively large right lobe hepatic cyst.  This is potentially the source for RUQ pain/pressure symptoms. She has been evaluated and has elected to defer at present any drainage of the cyst for the time being. I have asked her to let me know if this increases in symptom or if she would like to proceed with this procedure. She understands that the fluid would likely recur following drainage, but if her symptoms improved, this would localize the pain to the cyst and additional intervention could be considered. SMA dissection  She is due to see vascular surgeon next week for assessment. This has not been a noted finding on prior imaging. This will likely be followed with serial imaging CT scan in 3-6 months at which point we can also determine stability of the liver cyst as well. Skin Cancer  She developed a SCCA for the face in 2021. She has regular follow-up with dermatology every 6 months. Screening for Hepatocellular Carcinoma  HCC screening is not necessary if the patient has no evidence of cirrhosis. Treatment of other medical problems in patients with chronic liver disease  There are no contraindications for the patient to take most medications that are necessary for treatment of other medical issues. Counseling for alcohol in patients with chronic liver disease  The patient does not consume any significant amount of alcohol. Vaccinations   Vaccination for viral hepatitis A is not needed. The patient has serologic evidence of prior exposure or vaccination with immunity. The patient has received COVID vaccine in 2/2021 and the booster dose in 8/2021. She had COVID infection in 1/2022 and is fully recovered. She has had 2nd booster 4/2022 2 months prior to the increase in liver enzymes.     Routine vaccinations against other bacterial and viral agents can be performed as indicated. Annual flu vaccination should be administered if indicated. ALLERGIES  No Known Allergies    MEDICATIONS  Current Outpatient Medications   Medication Sig    cholecalciferol (VITAMIN D3) 25 mcg (1,000 unit) cap Take 2,000 Units by mouth daily. tacrolimus (PROGRAF) 1 mg capsule TAKE 1 CAPSULE BY MOUTH 2 TIMES A DAY    rosuvastatin (CRESTOR) 10 mg tablet Take 10 mg by mouth daily. lisinopril (PRINIVIL, ZESTRIL) 20 mg tablet Take 20 mg by mouth daily. diphenhydrAMINE (BENADRYL) 25 mg capsule Take 25 mg by mouth nightly as needed. No current facility-administered medications for this visit. SYSTEM REVIEW NOT RELATED TO LIVER DISEASE OR REVIEWED ABOVE:  Constitution systems: Negative for fever, chills, weight gain, weight loss. Eyes: Negative for visual changes. ENT: Negative for sore throat, painful swallowing. Respiratory: Negative for cough, hemoptysis, SOB. Cardiology: Negative for chest pain, palpitations. GI:  Negative for constipation or diarrhea. Intermittent RUQ pain/pressure, particularly with positional changes. : Negative for urinary frequency, dysuria, hematuria, nocturia. Skin: Negative for rash. Hematology: Negative for easy bruising, blood clots. Musculo-skeletal: Negative for back pain, muscle pain, weakness. Neurologic: Negative for headaches, dizziness, vertigo, memory problems not related to HE. Psychology: Negative for anxiety, depression. FAMILY HISTORY:  The father  at age 80 years. The mother is alive and healthy in her late 80s. There is no family history of liver disease. There is no family history of immune disorders. SOCIAL HISTORY:  The patient is . The patient has 3 children, and 6 grandchildren. The patient has never used tobacco products. The patient consumes 1 alcoholic beverages per day historically.   She discontinued alcohol with recognition of liver problem, but states that she has resumed more regular (daily) intake in Spring 2020. The patient used to work in banking. The patient retired in 2014. PHYSICAL EXAMINATION PERFORMED BY VIRTUAL TELEHEALTH:  VS: Not performed   General: No acute distress. Eyes: Sclera anicteric. ENT: No oral lesions. Skin: No rashes. spider angiomata. No jaundice. Abdomen: No obvious distention suggesting ascites. Extremities: No edema. No muscle wasting. Neurologic: Alert and oriented. Cranial nerves grossly intact.     LABORATORY STUDIES:  Liver [place] of [address] Units 10/4/2022 8/5/2022 6/29/2022   WBC 3.4 - 10.8 x10E3/uL 4.8 6.0 4.6   ANC 1.8 - 8.0 K/UL  4.1    HGB 11.1 - 15.9 g/dL 15.4 14.7 14.5    - 450 x10E3/uL 205 202 209   AST 0 - 40 IU/L 79 (H) 46 (H) 143 (H)   ALT 0 - 32 IU/L 122 (H) 92 (H) 368 (H)   Alk Phos 44 - 121 IU/L 56 51 52   Bili, Total 0.0 - 1.2 mg/dL 0.4 0.6 0.7   Bili, Direct 0.00 - 0.40 mg/dL 0.13 0.2 0.2   Albumin 3.7 - 4.7 g/dL 4.9 (H) 4.4 4.1   BUN 8 - 27 mg/dL 17 16 12   Creat 0.57 - 1.00 mg/dL 0.89 0.85 0.75   Na 134 - 144 mmol/L 140 140 137   K 3.5 - 5.2 mmol/L 4.6 4.4 4.7   Cl 96 - 106 mmol/L 103 105 99   CO2 20 - 29 mmol/L 23 27 23   Glucose 70 - 99 mg/dL 96 82 98   Magnesium 1.6 - 2.3 mg/dL        Liver Virology and Transplant Immune Suppression Latest Ref Rng & Units 6/29/2022   Tacrolimus Level 2.0 - 20.0 ng/mL 3.1     Liver Virology and Transplant Immune Suppression Latest Ref Rng & Units 6/10/2022   Tacrolimus Level 2.0 - 20.0 ng/mL 3.1     Liver Virology and Transplant Immune Suppression Latest Ref Rng & Units 2/21/2022   Tacrolimus Level 2.0 - 20.0 ng/mL 3.2       SEROLOGIES:  Serologies Latest Ref Rng 3/18/2016   Hep A Ab, Total Negative Positive (A)   Hep B Surface Ag Negative Negative   KATLYN, IFA  See patterns   KATLYN Pattern  1:1280 (H)   C-ANCA Neg:<1:20 titer <1:20   P-ANCA Neg:<1:20 titer <1:20   ANCA Neg:<1:20 titer <1:20   ASMCA 0 - 19 Units 71 (H)   M2 Ab 0.0 - 20.0 Units 11.4   Anti-SLA 0.0 - 20.0 units 1.0   LKM 0.0 - 20.0 Units 1.4     10/2015. Anti-HCV negative, KATLYN negative, ASMA positive    LIVER HISTOLOGY:  11/2015. Slides reviewed by MLS. Severe active hepatitis with PMN, bridging necrosis and bridging fibrosis. 4/2018. FibroScan performed at [facility]. EkPa was 10.9. Suggested fibrosis level is F3. CAP score is 324, suggestive of steatosis. 10/2019. FibroScan performed at [facility]. EkPa was 6.5. Suggested fibrosis level is F0-1. CAP score 268, this is consistent with steatosis. 8/2022. Slides reviewed by MLS. Mild portal inflammation, with Mild interface hepatitis, with No PMN. Bile ducts are normal.  No lobular inflammation. No steatosis. Gaston stage 4 fibrosis. Metavir stage 3 fibrosis. Knodell score (1013). ENDOSCOPIC PROCEDURES:  Not available or performed    RADIOLOGY:  7/2015. Ultrasound of abdomen. 5.6 cm right lobe mildly septated cyst.   9/2022. Ultrasound of liver. The liver is normal in echotexture with no mass. In the right lobe of liver  there is a 7.9 x 7.8 x 7.3 cm simple cyst..   10/2022. CT scan abdomen with and without IV contrast.The liver measures 18.5 cm longitudinal. There is a cyst of the RIGHT hepatic lobe segment 6 which measures 9.2 x 7.6 cm on series 5, image 35. It measures 8.3 cm craniocaudal on coronal series 052, slice 59. The cyst measures simple fluid density with no enhancing components. The wall is thin and there is a single, thin septation observed along the anterior margin. No mural nodularity is identified. The cyst abuts the main portal vein and the RIGHT portal vein without narrowing or occlusion. There is a 5 cm simple cyst of the LEFT kidney upper pole which requires no further follow-up. 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     OTHER TESTING:  Not available or performed    FOLLOW-UP AFTER VIRTUAL VISIT:  Pursuant to the emergency declaration under the [facility] waiver authority and the [employer] Act, this Virtual  Visit was conducted, with the patient's (and/or their legal guardian's) consent, to reduce the patient's risk of exposure to COVID-19 and provide necessary medical care. Services were provided through a video synchronous discussion virtually to substitute for an in-person clinic visit. The patient was located in their home. The provider was located in the [place] [address] office. All of the issues listed above in the Assessment and Plan were discussed with the patient. All questions were answered. The patient expressed a clear understanding of the above. [address] [place] [address] in 3 months for routine monitoring, virtual ok. Labs monthly. Documentation reviewed and updated to reflect current, accurate patient information.     Aldo Mcallister PA-C  [facility] 59, [address] [facility] 22.  [phone]  [address] [place] [address]

## 2022-11-08 ENCOUNTER — TELEPHONE (OUTPATIENT)
Dept: HEMATOLOGY | Age: 72
End: 2022-11-08

## 2022-11-08 DIAGNOSIS — K75.4 AUTOIMMUNE HEPATITIS (HCC): Primary | ICD-10-CM

## 2022-11-08 NOTE — PROGRESS NOTES
Pt notified, liver enzymes down modestly. Will continue with present dosing of tac 1 mg bid. 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 Plan repeat labs in one month, order sent.

## 2022-12-15 RX ORDER — TACROLIMUS 1 MG/1
CAPSULE ORAL
Qty: 180 CAPSULE | Refills: 3 | Status: SHIPPED | OUTPATIENT
Start: 2022-12-15

## 2022-12-21 LAB
ALBUMIN SERPL-MCNC: 4.6 G/DL (ref 3.5–5)
ALBUMIN/GLOB SERPL: 1.4 {RATIO} (ref 1.1–2.2)
ALP SERPL-CCNC: 50 U/L (ref 45–117)
ALT SERPL-CCNC: 62 U/L (ref 12–78)
ANION GAP SERPL CALC-SCNC: 5 MMOL/L (ref 5–15)
AST SERPL-CCNC: 48 U/L (ref 15–37)
BILIRUB SERPL-MCNC: 0.5 MG/DL (ref 0.2–1)
BUN SERPL-MCNC: 16 MG/DL (ref 6–20)
BUN/CREAT SERPL: 18 (ref 12–20)
CALCIUM SERPL-MCNC: 10 MG/DL (ref 8.5–10.1)
CHLORIDE SERPL-SCNC: 105 MMOL/L (ref 97–108)
CO2 SERPL-SCNC: 28 MMOL/L (ref 21–32)
CREAT SERPL-MCNC: 0.91 MG/DL (ref 0.55–1.02)
GLOBULIN SER CALC-MCNC: 3.2 G/DL (ref 2–4)
GLUCOSE SERPL-MCNC: 99 MG/DL (ref 65–100)
POTASSIUM SERPL-SCNC: 4.5 MMOL/L (ref 3.5–5.1)
PROT SERPL-MCNC: 7.8 G/DL (ref 6.4–8.2)
SODIUM SERPL-SCNC: 138 MMOL/L (ref 136–145)

## 2022-12-27 ENCOUNTER — TELEPHONE (OUTPATIENT)
Dept: HEMATOLOGY | Age: 72
End: 2022-12-27

## 2022-12-27 DIAGNOSIS — K75.4 AUTOIMMUNE HEPATITIS (HCC): Primary | ICD-10-CM

## 2022-12-27 NOTE — PROGRESS NOTES
Pt notified of stable findings, will remain on tac 1 mg bid and repeat labs prior to follow-up 2/2023. Still awaiting changes in plan for management of cyst/SMA findings.

## 2023-02-21 ENCOUNTER — VIRTUAL VISIT (OUTPATIENT)
Dept: HEMATOLOGY | Age: 73
End: 2023-02-21
Payer: MEDICARE

## 2023-02-21 DIAGNOSIS — K75.4 AUTOIMMUNE HEPATITIS (HCC): Primary | ICD-10-CM

## 2023-02-21 PROCEDURE — 3017F COLORECTAL CA SCREEN DOC REV: CPT | Performed by: PHYSICIAN ASSISTANT

## 2023-02-21 PROCEDURE — G8432 DEP SCR NOT DOC, RNG: HCPCS | Performed by: PHYSICIAN ASSISTANT

## 2023-02-21 PROCEDURE — 1090F PRES/ABSN URINE INCON ASSESS: CPT | Performed by: PHYSICIAN ASSISTANT

## 2023-02-21 PROCEDURE — G8400 PT W/DXA NO RESULTS DOC: HCPCS | Performed by: PHYSICIAN ASSISTANT

## 2023-02-21 PROCEDURE — 1101F PT FALLS ASSESS-DOCD LE1/YR: CPT | Performed by: PHYSICIAN ASSISTANT

## 2023-02-21 PROCEDURE — 1123F ACP DISCUSS/DSCN MKR DOCD: CPT | Performed by: PHYSICIAN ASSISTANT

## 2023-02-21 PROCEDURE — G0463 HOSPITAL OUTPT CLINIC VISIT: HCPCS | Performed by: PHYSICIAN ASSISTANT

## 2023-02-21 PROCEDURE — G8427 DOCREV CUR MEDS BY ELIG CLIN: HCPCS | Performed by: PHYSICIAN ASSISTANT

## 2023-02-21 PROCEDURE — 99213 OFFICE O/P EST LOW 20 MIN: CPT | Performed by: PHYSICIAN ASSISTANT

## 2023-02-21 NOTE — PROGRESS NOTES
[address] [facility], MD, [place], Sivakumar Arturo Garcia, [place]      Juan Mata PAPRAKASH Knowles S Colten, [facility] Center[facility]-BC   Atnonio Fernando, [facility]-AG   Pawan Kothari, FNP-C   Pura Krishnan, FNP-C    Sophia Storey, [facility]-BC       Waleska Gambino Suman [address]    at [place] [address] [address] [facility], [address] [facility], [facility].    [phone]    FAX: [address]    [facility]    [address], [address] [place] [address], [address]    [phone]    FAX: [phone]       Patient Care Team:  Fan Charles MD as PCP - General (Family Medicine)  Gio Ghotra MD (Gastroenterology)      Problem List  Date Reviewed: 10/28/2022            Codes Class Noted    Hepatic cyst ICD-10-CM: K76.89  ICD-9-CM: 573.8  10/28/2022        Autoimmune hepatitis ([facility] [address] ICD-10-CM: K75.4  ICD-9-CM: 571.42  3/18/2016        Hypertension ICD-10-CM: I10  ICD-9-CM: 401.9  3/18/2016        Hypercholesterolemia ICD-10-CM: E78.00  ICD-9-CM: 272.0  3/18/2016         VIRTUAL TELEHEALTH VISIT PERFORMED DUE TO COVID-19 EPIDEMIC    CONSENT:  Fito Mccauley, who was evaluated through a synchronous (real-time) audio-video encounter, and/or his healthcare decision maker, is aware that it is a billable service, which includes applicable co-pays, with coverage as determined by his insurance carrier. He provided verbal consent to proceed and patient identification was verified. This visit was conducted pursuant to the emergency declaration under the [facility], [address] [place] [address] [facility] authority and the [employer] Act. A caregiver was present when appropriate. Ability to conduct physical exam was limited. The patient was located at home in a state where the provider was licensed to provide care. Ananda Vera is being seen at [place] [address] for management of Autoimmune Hepatitis. The active problem list, all pertinent past medical history, medications, liver histology, radiologic findings and laboratory findings related to the liver disorder were reviewed and discussed with the patient. The patient is a 68 y.o.  female who was found to be elevated liver transaminases into the 1000 range in 12/2015. The patient was switched from Crestor to Lipitor prior to the elevation in liver enzymes. All statins were stopped but the liver enzyme elevation did not resolved. Serologic evaluation for markers of chronic liver disease were positive for KATLYN and ASMA. A liver biopsy in 11/2015 demonstrated severe active hepatitis with bridging necrosis and fibrosis. She was initially started on prednisone and azathioprine but due to persistent elevation in enzymes, was switched over to tac in 2017/  The patient is now being treated with tacrolimus monotherapy at a dose of 1 mg bid. After having normal liver enzymes for years she has had slow progressive rise in liver transaminases which started 1 month following COVID booster in mid 4/2022. She also received a rabies vaccine in 3 and 4/2022 after being bitten by a racoon. The patient underwent a liver biopsy in 8/2022. This demonstrates mild portal inflammation and stage 3 fibrosis consistent with ongoing controlled AIH. Her liver enzymes were noted to be generally be coming down since 8/2022 on monitoring through 12/2022. At the time of the liver biopsy, she was noted to have a moderately large hepatic cyst on ultrasound. She had contacted me in late 9/2022 about abdominal pain and ultrasound was performed showing a ~8 cm right lobe hepatic cyst.  This was followed with CT scan showing again dimensions of cyst to be 9 x 8 cm.  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 She has since met with Dr Tino Wu in surgery for evaluation. He has offered IR drainage of the cyst to identify if that relieves her pressure/pain and if so, surgical marsupialization could be considered. She has relayed that the pain is not so severe at this time and she would like to defer drainage at present. She has seen Dr Juan Rocha with vascular surgery for review of the SMA dissection. She reports that he was not overly concerned with the SMA but recommended that this have repeat imaging in 6 months which he has ordered. This is not likely to be the cause of the pressure in the RUQ and she has had no crampy abdominal pain symptoms as might be expected. She has been having some increased orthopedic issues, knee. She has had a short course of higher dose Ibu last month in 1/2023 but is not taking this regularly. The patient is not experiencing the following symptoms which are commonly seen in this liver disorder:   fatigue, myalgias, arthralgias. She continues to tolerate the tacrolimus reasonably well. The patient completes all daily activities without any functional limitations. ASSESSMENT AND PLAN:  Autoimmune Hepatitis   The diagnosis was based upon laboratory studies, serology, liver biopsy. A liver biopsy performed in 11/2015 shows severe inflammation with interface hepatitis with piecemeal necrosis and Stage 3 bridging fibrosis. Fibroscan performed in 10/2019 demonstrated a liver stiffness of 6.5 consistent and CAP score 268 suggesting fatty liver and stage 1 fibrosis. The patient was initially treated with prednisone 30 mg every day and azathioprine 50 mg every day   The liver enzymes declined from the 1000 range to about 300-400 range and then stabilized. The dose of azathioprine was increased but this had little effect. The patient was started on tacrolimus in 10/2017.       The liver enzymes declined in to the normal range.  Prednisone was taper and stopped in 6/2017. Azathioprine was stopped in 4/2018. Liver enzymes have remained normal from late 2017 until 5/2022. This was about 1 month following COVID booster and  3 months following rabies vaccination. Liver enzymes were monitored but continued to drift upward into the 100 range in 6/2022. A repeat liver biopsy in 8/2022 demonstrated mild portal inflammation, mild interface activity, no PMN and stage 3 bridging fibrosis. Suspect COVID vaccine (or possibly rabies) initiated an immune response with elevation in liver enzymes. The liver enzymes remain modestly elevated on recent labs. She will go for additional lab studies in the near future. Since the biopsy shows mild ongoing inflammation will continue tacrolimus at the current dose and see if the lvier enzymes come down to normal.    If they do not there are 2 options. 1)Pulse with prednisone 20 mg every day until the liver transaminases are normal and then taper or 2) increase the dose of tacrolimus and then taper this down over time. We will defer changes in dosing pending labs. Hepatic cyst  She has a relatively large right lobe hepatic cyst.  This is potentially the source for RUQ pain/pressure symptoms. She has been evaluated and has elected to defer at present any drainage of the cyst for the time being. I have asked her to let me know if this increases in symptom or if she would like to proceed with this procedure. She understands that the fluid would likely recur following drainage, but if her symptoms improved, this would localize the pain to the cyst and additional intervention could be considered. SMA dissection  She has seen vascular surgeon, Dr Jennifer Akesr, who was not concerned about the dissection, but has scheduled her for follow-up CT for 5/2023 (he ordered) to ensure no change over time.   At that time and with repeat CT we can also determine stability of the liver cyst as well.     Skin Cancer  She developed a SCCA for the face in 2021. She has regular follow-up with dermatology every 6 months and has done recent round of Effudex for hairline treatment. Screening for Hepatocellular Carcinoma  HCC screening is not necessary if the patient has no evidence of cirrhosis. Treatment of other medical problems in patients with chronic liver disease  There are no contraindications for the patient to take most medications that are necessary for treatment of other medical issues. Counseling for alcohol in patients with chronic liver disease  The patient does not consume any significant amount of alcohol. Vaccinations   Vaccination for viral hepatitis A is not needed. The patient has serologic evidence of prior exposure or vaccination with immunity. The patient has received COVID vaccine in 2/2021 and the booster dose in 8/2021. She had COVID infection in 1/2022 and is fully recovered. She has had 2nd booster 4/2022 2 months prior to the increase in liver enzymes. Routine vaccinations against other bacterial and viral agents can be performed as indicated. Annual flu vaccination should be administered if indicated. ALLERGIES  No Known Allergies    MEDICATIONS  Current Outpatient Medications   Medication Sig    tacrolimus (PROGRAF) 1 mg capsule TAKE 1 CAPSULE BY MOUTH 2 TIMES A DAY. GENERIC FOR PROGRAF. cholecalciferol (VITAMIN D3) 25 mcg (1,000 unit) cap Take 2,000 Units by mouth daily. rosuvastatin (CRESTOR) 10 mg tablet Take 10 mg by mouth daily. lisinopril (PRINIVIL, ZESTRIL) 20 mg tablet Take 20 mg by mouth daily. diphenhydrAMINE (BENADRYL) 25 mg capsule Take 25 mg by mouth nightly as needed. No current facility-administered medications for this visit. SYSTEM REVIEW NOT RELATED TO LIVER DISEASE OR REVIEWED ABOVE:  Constitution systems: Negative for fever, chills, weight gain, weight loss. Eyes: Negative for visual changes.     ENT: Negative for sore throat, painful swallowing. Respiratory: Negative for cough, hemoptysis, SOB. Cardiology: Negative for chest pain, palpitations. GI:  Negative for constipation or diarrhea. Intermittent RUQ pain/pressure, particularly with positional changes. : Negative for urinary frequency, dysuria, hematuria, nocturia. Skin: Negative for rash. Hematology: Negative for easy bruising, blood clots. Musculo-skeletal: Negative for back pain, muscle pain, weakness. Neurologic: Negative for headaches, dizziness, vertigo, memory problems not related to HE. Psychology: Negative for anxiety, depression. FAMILY HISTORY:  The father  at age 80 years. The mother is alive and healthy in her late 80s. There is no family history of liver disease. There is no family history of immune disorders. SOCIAL HISTORY:  The patient is . The patient has 3 children, and 6 grandchildren. The patient has never used tobacco products. The patient consumes 1 alcoholic beverages per day historically. She discontinued alcohol with recognition of liver problem, but states that she has resumed more regular (daily) intake in Spring 2020. The patient used to work in banking. The patient retired in . PHYSICAL EXAMINATION PERFORMED BY [employer]:  VS: Not performed   General: No acute distress. Eyes: Sclera anicteric. ENT: No oral lesions. Skin: No rashes. spider angiomata. No jaundice. Abdomen: No obvious distention suggesting ascites. Extremities: No edema. No muscle wasting. Neurologic: Alert and oriented. Cranial nerves grossly intact.     LABORATORY STUDIES:  Liver [place] [address] Units 2022   WBC 3.6 - 11.0 K/uL 4.3 5.3   ANC 1.8 - 8.0 K/UL     HGB 11.5 - 16.0 g/dL 15.2 14.8    - 400 K/uL 189 173   AST 15 - 37 U/L 44 (H) 45 (H)   ALT 12 - 78 U/L 61 74   Alk Phos 45 - 117 U/L 52 54   Bili, Total 0.2 - 1.0 MG/DL 0.5 0.6   Bili, Direct 0.0 - 0.2 MG/DL 0.1 0.2   Albumin 3.5 - 5.0 g/dL 4.7 4.2   BUN 6 - 20 MG/DL  17   Creat 0.55 - 1.02 MG/DL  0.78   Na 136 - 145 mmol/L  139   K 3.5 - 5.1 mmol/L  4.2   Cl 97 - 108 mmol/L  107   CO2 21 - 32 mmol/L  29   Glucose 65 - 100 mg/dL  91   Magnesium 1.6 - 2.3 mg/dL       Liver [place] [address] Ref Rng & Units 10/4/2022   WBC 3.6 - 11.0 K/uL 4.8   ANC 1.8 - 8.0 K/UL    HGB 11.5 - 16.0 g/dL 15.4    - 400 K/uL 205   AST 15 - 37 U/L 79 (H)   ALT 12 - 78 U/L 122 (H)   Alk Phos 45 - 117 U/L 56   Bili, Total 0.2 - 1.0 MG/DL 0.4   Bili, Direct 0.0 - 0.2 MG/DL 0.13   Albumin 3.5 - 5.0 g/dL 4.9 (H)   BUN 6 - 20 MG/DL 17   Creat 0.55 - 1.02 MG/DL 0.89   Na 136 - 145 mmol/L 140   K 3.5 - 5.1 mmol/L 4.6   Cl 97 - 108 mmol/L 103   CO2 21 - 32 mmol/L 23   Glucose 65 - 100 mg/dL 96   Magnesium 1.6 - 2.3 mg/dL      Liver Virology and Transplant Immune Suppression Latest Ref Rng & Units 2022   Tacrolimus Level 2.0 - 20.0 ng/mL    Tacrolimus Ther Rn.0-15.0 ng/mL 3.7 (L)     Liver Virology and Transplant Immune Suppression Latest Ref Rng & Units 2022   Tacrolimus Level 2.0 - 20.0 ng/mL    Tacrolimus Ther Rn.0-15.0 ng/mL 2.6 (L)     Liver Virology and Transplant Immune Suppression Latest Ref Rng & Units 2022   Tacrolimus Level 2.0 - 20.0 ng/mL 3.1   Tacrolimus Ther Rn.0-15.0 ng/mL      SEROLOGIES:  Serologies Latest Ref Rng 3/18/2016   Hep A Ab, Total Negative Positive (A)   Hep B Surface Ag Negative Negative   KATLYN, IFA  See patterns   KATLYN Pattern  1:1280 (H)   C-ANCA Neg:<1:20 titer <1:20   P-ANCA Neg:<1:20 titer <1:20   ANCA Neg:<1:20 titer <1:20   ASMCA 0 - 19 Units 71 (H)   M2 Ab 0.0 - 20.0 Units 11.4   Anti-SLA 0.0 - 20.0 units 1.0   LKM 0.0 - 20.0 Units 1.4     10/2015. Anti-HCV negative, KATLYN negative, ASMA positive    LIVER HISTOLOGY:  2015. Slides reviewed by MLS. Severe active hepatitis with PMN, bridging necrosis and bridging fibrosis. 2018. FibroScan performed at [facility]. EkPa was 10.9. Suggested fibrosis level is F3. CAP score is 324, suggestive of steatosis. 10/2019. FibroScan performed at [facility]. EkPa was 6.5. Suggested fibrosis level is F0-1. CAP score 268, this is consistent with steatosis. 8/2022. Slides reviewed by MLS. Mild portal inflammation, with Mild interface hepatitis, with No PMN. Bile ducts are normal.  No lobular inflammation. No steatosis. Gaston stage 4 fibrosis. Metavir stage 3 fibrosis. Knodell score (1013). ENDOSCOPIC PROCEDURES:  Not available or performed    RADIOLOGY:  7/2015. Ultrasound of abdomen. 5.6 cm right lobe mildly septated cyst.   9/2022. Ultrasound of liver. The liver is normal in echotexture with no mass. In the right lobe of liver  there is a 7.9 x 7.8 x 7.3 cm simple cyst..   10/2022. CT scan abdomen with and without IV contrast.The liver measures 18.5 cm longitudinal. There is a cyst of the RIGHT hepatic lobe segment 6 which measures 9.2 x 7.6 cm on series 5, image 35. It measures 8.3 cm craniocaudal on coronal series 159, slice 59. The cyst measures simple fluid density with no enhancing components. The wall is thin and there is a single, thin septation observed along the anterior margin. No mural nodularity is identified. The cyst abuts the main portal vein and the RIGHT portal vein without narrowing or occlusion. There is a 5 cm simple cyst of the LEFT kidney upper pole which requires no further follow-up. 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     OTHER TESTING:  Not available or performed    FOLLOW-UP AFTER VIRTUAL VISIT:  Pursuant to the emergency declaration under the [facility] waiver authority and the [employer] Act, this Virtual  Visit was conducted, with the patient's (and/or their legal guardian's) consent, to reduce the patient's risk of exposure to COVID-19 and provide necessary medical care. Services were provided through a video synchronous discussion virtually to substitute for an in-person clinic visit. The patient was located in their home. The provider was located in the [place] [address] office. All of the issues listed above in the Assessment and Plan were discussed with the patient. All questions were answered. The patient expressed a clear understanding of the above. [address] [place] [address] in 3-4 months for routine monitoring, virtual ok. Labs monthly for now. Documentation reviewed and updated to reflect current, accurate patient information.     Ramakrishna Valles PA-C  Liver [facility], [address] [facility] Kenny Cagle  22.  [phone]  [address]

## 2023-02-21 NOTE — PROGRESS NOTES
Identified pt with two pt identifiers(name and ). Reviewed record in preparation for visit and have obtained necessary documentation. Chief Complaint   Patient presents with    Other     VV 3month autoimmune hepatitis follow up      There were no vitals filed for this visit. Health Maintenance Review: Patient reminded of \"due or due soon\" health maintenance. I have asked the patient to contact his/her primary care provider (PCP) for follow-up on his/her health maintenance. Coordination of Care Questionnaire:  :   1) Have you been to an emergency room, urgent care, or hospitalized since your last visit? If yes, where when, and reason for visit? no       2. Have seen or consulted any other health care provider since your last visit? If yes, where when, and reason for visit? NO      Patient is accompanied by self I have received verbal consent from Esteban Hi to discuss any/all medical information while they are present in the room.

## 2023-02-21 NOTE — Clinical Note
NOTIFICATION RETURN TO WORK / SCHOOL    2/21/2023 9:11 AM    Ms. Frances Samayoa  [address] [place] [address]      To Whom It May Concern:    Frances Samayoa is currently under the care of [address]. She will return to work/school on: ***    If there are questions or concerns please have the patient contact our office.         Sincerely,      CHRISTOPH Em

## 2023-02-28 LAB
ANION GAP SERPL CALC-SCNC: 4 MMOL/L (ref 5–15)
BUN SERPL-MCNC: 16 MG/DL (ref 6–20)
BUN/CREAT SERPL: 16 (ref 12–20)
CALCIUM SERPL-MCNC: 10.1 MG/DL (ref 8.5–10.1)
CHLORIDE SERPL-SCNC: 104 MMOL/L (ref 97–108)
CO2 SERPL-SCNC: 29 MMOL/L (ref 21–32)
CREAT SERPL-MCNC: 0.98 MG/DL (ref 0.55–1.02)
GLUCOSE SERPL-MCNC: 105 MG/DL (ref 65–100)
POTASSIUM SERPL-SCNC: 4.6 MMOL/L (ref 3.5–5.1)
SODIUM SERPL-SCNC: 137 MMOL/L (ref 136–145)

## 2023-03-01 DIAGNOSIS — K75.4 AUTOIMMUNE HEPATITIS (HCC): Primary | ICD-10-CM

## 2023-03-01 NOTE — PROGRESS NOTES
Pt notified of continued slow reduction in liver enzymes. Will continue with tac 1 mg bid and plan on repeat labs in 4 mo prior to next vv.

## 2023-04-22 DIAGNOSIS — K75.4 AUTOIMMUNE HEPATITIS (HCC): Primary | ICD-10-CM

## 2023-04-24 DIAGNOSIS — K75.4 AUTOIMMUNE HEPATITIS (HCC): Primary | ICD-10-CM

## 2023-05-22 RX ORDER — DIPHENHYDRAMINE HCL 25 MG
25 CAPSULE ORAL
COMMUNITY

## 2023-05-22 RX ORDER — LISINOPRIL 20 MG/1
20 TABLET ORAL DAILY
COMMUNITY

## 2023-05-22 RX ORDER — TACROLIMUS 1 MG/1
CAPSULE ORAL
COMMUNITY
Start: 2022-12-15

## 2023-05-22 RX ORDER — ROSUVASTATIN CALCIUM 10 MG/1
10 TABLET, COATED ORAL DAILY
COMMUNITY
Start: 2017-08-28

## 2023-06-10 LAB
ALBUMIN SERPL-MCNC: 4.4 G/DL (ref 3.5–5)
ALBUMIN/GLOB SERPL: 1 (ref 1.1–2.2)
ALP SERPL-CCNC: 190 U/L (ref 45–117)
ALT SERPL-CCNC: 221 U/L (ref 12–78)
ANION GAP SERPL CALC-SCNC: 7 MMOL/L (ref 5–15)
AST SERPL-CCNC: 206 U/L (ref 15–37)
BILIRUB DIRECT SERPL-MCNC: 0.6 MG/DL (ref 0–0.2)
BILIRUB SERPL-MCNC: 1.7 MG/DL (ref 0.2–1)
BUN SERPL-MCNC: 12 MG/DL (ref 6–20)
BUN/CREAT SERPL: 15 (ref 12–20)
CALCIUM SERPL-MCNC: 9.8 MG/DL (ref 8.5–10.1)
CHLORIDE SERPL-SCNC: 105 MMOL/L (ref 97–108)
CO2 SERPL-SCNC: 26 MMOL/L (ref 21–32)
CREAT SERPL-MCNC: 0.78 MG/DL (ref 0.55–1.02)
ERYTHROCYTE [DISTWIDTH] IN BLOOD BY AUTOMATED COUNT: 13 % (ref 11.5–14.5)
GLOBULIN SER CALC-MCNC: 4.4 G/DL (ref 2–4)
GLUCOSE SERPL-MCNC: 83 MG/DL (ref 65–100)
HCT VFR BLD AUTO: 47.5 % (ref 35–47)
HGB BLD-MCNC: 14.6 G/DL (ref 11.5–16)
MCH RBC QN AUTO: 31.3 PG (ref 26–34)
MCHC RBC AUTO-ENTMCNC: 30.7 G/DL (ref 30–36.5)
MCV RBC AUTO: 101.7 FL (ref 80–99)
NRBC # BLD: 0 K/UL (ref 0–0.01)
NRBC BLD-RTO: 0 PER 100 WBC
PLATELET # BLD AUTO: 195 K/UL (ref 150–400)
PMV BLD AUTO: 11.3 FL (ref 8.9–12.9)
POTASSIUM SERPL-SCNC: 4.3 MMOL/L (ref 3.5–5.1)
PROT SERPL-MCNC: 8.8 G/DL (ref 6.4–8.2)
RBC # BLD AUTO: 4.67 M/UL (ref 3.8–5.2)
SODIUM SERPL-SCNC: 138 MMOL/L (ref 136–145)
WBC # BLD AUTO: 4.7 K/UL (ref 3.6–11)

## 2023-06-11 LAB — TACROLIMUS BLOOD: 6.9 NG/ML

## 2023-06-21 ENCOUNTER — TELEMEDICINE (OUTPATIENT)
Age: 73
End: 2023-06-21
Payer: MEDICARE

## 2023-06-21 DIAGNOSIS — K75.4 AUTOIMMUNE HEPATITIS (HCC): Primary | ICD-10-CM

## 2023-06-21 DIAGNOSIS — R97.8 OTHER ABNORMAL TUMOR MARKERS: ICD-10-CM

## 2023-06-21 PROCEDURE — G8400 PT W/DXA NO RESULTS DOC: HCPCS | Performed by: PHYSICIAN ASSISTANT

## 2023-06-21 PROCEDURE — G8427 DOCREV CUR MEDS BY ELIG CLIN: HCPCS | Performed by: PHYSICIAN ASSISTANT

## 2023-06-21 PROCEDURE — 1123F ACP DISCUSS/DSCN MKR DOCD: CPT | Performed by: PHYSICIAN ASSISTANT

## 2023-06-21 PROCEDURE — 99213 OFFICE O/P EST LOW 20 MIN: CPT | Performed by: PHYSICIAN ASSISTANT

## 2023-06-21 PROCEDURE — 3017F COLORECTAL CA SCREEN DOC REV: CPT | Performed by: PHYSICIAN ASSISTANT

## 2023-06-21 PROCEDURE — 1090F PRES/ABSN URINE INCON ASSESS: CPT | Performed by: PHYSICIAN ASSISTANT

## 2023-06-21 ASSESSMENT — PATIENT HEALTH QUESTIONNAIRE - PHQ9
SUM OF ALL RESPONSES TO PHQ QUESTIONS 1-9: 0
1. LITTLE INTEREST OR PLEASURE IN DOING THINGS: 0
SUM OF ALL RESPONSES TO PHQ QUESTIONS 1-9: 0
SUM OF ALL RESPONSES TO PHQ9 QUESTIONS 1 & 2: 0
2. FEELING DOWN, DEPRESSED OR HOPELESS: 0

## 2023-06-21 NOTE — PROGRESS NOTES
Identified pt with two pt identifiers(name and ). Reviewed record in preparation for visit and have obtained necessary documentation. Chief Complaint   Patient presents with    Hepatitis C     VV follow up     There were no vitals taken for this visit. 1. \"Have you been to the ER, urgent care clinic since your last visit? Hospitalized since your last visit? \" Yes ED visit for broken shoulder and fall. 2. \"Have you seen or consulted any other health care providers outside of the [address] [place] [address] since your last visit? \" No     Patient is accompanied by Self I have received verbal consent from Annie Holley to discuss any/all medical information while they are present in the room.
simple fluid density with no enhancing components. The wall is thin and there is a single, thin septation observed along the anterior margin. No mural nodularity is identified. The cyst abuts the main portal vein and the RIGHT portal vein without narrowing or occlusion. There is a 5 cm simple cyst of the LEFT kidney upper pole which requires no further follow-up. 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 5/2023. CT abdomen at [facility], will call for report. OTHER TESTING:  Not available or performed    FOLLOW-UP AFTER VIRTUAL VISIT:  Pursuant to the emergency declaration under the [facility] waiver authority and the [employer] Act, this Virtual  Visit was conducted, with the patient's (and/or their legal guardian's) consent, to reduce the patient's risk of exposure to COVID-19 and provide necessary medical care. Services were provided through a video synchronous discussion virtually to substitute for an in-person clinic visit. The patient was located in their home. The provider was located in the [facility] [employer] office. All of the issues listed above in the Assessment and Plan were discussed with the patient. All questions were answered. The patient expressed a clear understanding of the above. [facility] 87 in 3 months for routine monitoring, virtual ok. Labs to be repeated for determination of any change in IS needed next week. Documentation reviewed and updated to reflect current, accurate patient information.     Amando Otero PA-C  Liver [facility] 59, [address] [facility] Gaurav Vizcarracrystal  22.  [phone]  [address] [facility]

## 2023-06-22 ENCOUNTER — CLINICAL DOCUMENTATION (OUTPATIENT)
Age: 73
End: 2023-06-22

## 2023-06-22 NOTE — PROGRESS NOTES
Records request for patients most recent CT Abdomen faxed to Fab Riley (at [phone]), per Terrence VILLAFUERTE    Fax confirmation received.

## 2023-06-26 ENCOUNTER — CLINICAL DOCUMENTATION (OUTPATIENT)
Age: 73
End: 2023-06-26

## 2023-06-29 LAB
ALBUMIN SERPL-MCNC: 4 G/DL (ref 3.5–5)
ALBUMIN/GLOB SERPL: 1.3 (ref 1.1–2.2)
ALP SERPL-CCNC: 50 U/L (ref 45–117)
ALT SERPL-CCNC: 66 U/L (ref 12–78)
ANION GAP SERPL CALC-SCNC: 7 MMOL/L (ref 5–15)
AST SERPL-CCNC: 42 U/L (ref 15–37)
BILIRUB DIRECT SERPL-MCNC: 0.2 MG/DL (ref 0–0.2)
BILIRUB SERPL-MCNC: 0.5 MG/DL (ref 0.2–1)
BUN SERPL-MCNC: 17 MG/DL (ref 6–20)
BUN/CREAT SERPL: 22 (ref 12–20)
CALCIUM SERPL-MCNC: 9.3 MG/DL (ref 8.5–10.1)
CHLORIDE SERPL-SCNC: 109 MMOL/L (ref 97–108)
CO2 SERPL-SCNC: 24 MMOL/L (ref 21–32)
CREAT SERPL-MCNC: 0.77 MG/DL (ref 0.55–1.02)
ERYTHROCYTE [DISTWIDTH] IN BLOOD BY AUTOMATED COUNT: 11.9 % (ref 11.5–14.5)
GLOBULIN SER CALC-MCNC: 3.1 G/DL (ref 2–4)
GLUCOSE SERPL-MCNC: 98 MG/DL (ref 65–100)
HCT VFR BLD AUTO: 43.2 % (ref 35–47)
HGB BLD-MCNC: 13.9 G/DL (ref 11.5–16)
MCH RBC QN AUTO: 31.2 PG (ref 26–34)
MCHC RBC AUTO-ENTMCNC: 32.2 G/DL (ref 30–36.5)
MCV RBC AUTO: 96.9 FL (ref 80–99)
NRBC # BLD: 0 K/UL (ref 0–0.01)
NRBC BLD-RTO: 0 PER 100 WBC
PLATELET # BLD AUTO: 171 K/UL (ref 150–400)
PMV BLD AUTO: 10.5 FL (ref 8.9–12.9)
POTASSIUM SERPL-SCNC: 4.3 MMOL/L (ref 3.5–5.1)
PROT SERPL-MCNC: 7.1 G/DL (ref 6.4–8.2)
RBC # BLD AUTO: 4.46 M/UL (ref 3.8–5.2)
SODIUM SERPL-SCNC: 140 MMOL/L (ref 136–145)
WBC # BLD AUTO: 4.8 K/UL (ref 3.6–11)

## 2023-06-30 LAB
ALP SERPL-CCNC: 53 IU/L (ref 44–121)
TACROLIMUS BLOOD: 3.3 NG/ML

## 2023-07-01 LAB — CANCER AG19-9 SERPL-ACNC: 26 U/ML (ref 0–35)

## 2023-07-03 LAB
AFP L3 MFR SERPL: NORMAL % (ref 0–9.9)
AFP SERPL-MCNC: 1.7 NG/ML (ref 0–9.2)
ALP BONE CFR SERPL: 57 % (ref 14–68)
ALP INTEST CFR SERPL: 0 % (ref 0–18)
ALP LIVER CFR SERPL: 43 % (ref 18–85)
ALP SERPL-CCNC: 53 IU/L (ref 44–121)

## 2023-09-29 LAB
BASOPHILS # BLD AUTO: 0 X10E3/UL (ref 0–0.2)
BASOPHILS NFR BLD AUTO: 1 %
EOSINOPHIL # BLD AUTO: 0 X10E3/UL (ref 0–0.4)
EOSINOPHIL NFR BLD AUTO: 1 %
ERYTHROCYTE [DISTWIDTH] IN BLOOD BY AUTOMATED COUNT: 12.2 % (ref 11.7–15.4)
HCT VFR BLD AUTO: 44.9 % (ref 34–46.6)
HGB BLD-MCNC: 15.2 G/DL (ref 11.1–15.9)
IMM GRANULOCYTES # BLD AUTO: 0 X10E3/UL (ref 0–0.1)
IMM GRANULOCYTES NFR BLD AUTO: 0 %
LYMPHOCYTES # BLD AUTO: 1.2 X10E3/UL (ref 0.7–3.1)
LYMPHOCYTES NFR BLD AUTO: 27 %
MCH RBC QN AUTO: 31.3 PG (ref 26.6–33)
MCHC RBC AUTO-ENTMCNC: 33.9 G/DL (ref 31.5–35.7)
MCV RBC AUTO: 92 FL (ref 79–97)
MONOCYTES # BLD AUTO: 0.3 X10E3/UL (ref 0.1–0.9)
MONOCYTES NFR BLD AUTO: 7 %
NEUTROPHILS # BLD AUTO: 2.9 X10E3/UL (ref 1.4–7)
NEUTROPHILS NFR BLD AUTO: 64 %
PLATELET # BLD AUTO: 179 X10E3/UL (ref 150–450)
RBC # BLD AUTO: 4.86 X10E6/UL (ref 3.77–5.28)
WBC # BLD AUTO: 4.5 X10E3/UL (ref 3.4–10.8)

## 2023-09-30 LAB
ALBUMIN SERPL-MCNC: 5.2 G/DL (ref 3.8–4.8)
ALP SERPL-CCNC: 50 IU/L (ref 44–121)
ALT SERPL-CCNC: 32 IU/L (ref 0–32)
AST SERPL-CCNC: 31 IU/L (ref 0–40)
BILIRUB DIRECT SERPL-MCNC: 0.17 MG/DL (ref 0–0.4)
BILIRUB SERPL-MCNC: 0.6 MG/DL (ref 0–1.2)
PROT SERPL-MCNC: 7.4 G/DL (ref 6–8.5)

## 2023-10-02 ENCOUNTER — TELEMEDICINE (OUTPATIENT)
Age: 73
End: 2023-10-02
Payer: MEDICARE

## 2023-10-02 DIAGNOSIS — K75.4 AUTOIMMUNE HEPATITIS (HCC): Primary | ICD-10-CM

## 2023-10-02 LAB
ALBUMIN SERPL-MCNC: 5.2 G/DL (ref 3.8–4.8)
ALP SERPL-CCNC: 50 IU/L (ref 44–121)
ALT SERPL-CCNC: 32 IU/L (ref 0–32)
AST SERPL-CCNC: 31 IU/L (ref 0–40)
BASOPHILS # BLD AUTO: 0 X10E3/UL (ref 0–0.2)
BASOPHILS NFR BLD AUTO: 1 %
BILIRUB DIRECT SERPL-MCNC: 0.17 MG/DL (ref 0–0.4)
BILIRUB SERPL-MCNC: 0.6 MG/DL (ref 0–1.2)
EOSINOPHIL # BLD AUTO: 0 X10E3/UL (ref 0–0.4)
EOSINOPHIL NFR BLD AUTO: 1 %
ERYTHROCYTE [DISTWIDTH] IN BLOOD BY AUTOMATED COUNT: 12.2 % (ref 11.7–15.4)
HCT VFR BLD AUTO: 44.9 % (ref 34–46.6)
HGB BLD-MCNC: 15.2 G/DL (ref 11.1–15.9)
IMM GRANULOCYTES # BLD AUTO: 0 X10E3/UL (ref 0–0.1)
IMM GRANULOCYTES NFR BLD AUTO: 0 %
LYMPHOCYTES # BLD AUTO: 1.2 X10E3/UL (ref 0.7–3.1)
LYMPHOCYTES NFR BLD AUTO: 27 %
MCH RBC QN AUTO: 31.3 PG (ref 26.6–33)
MCHC RBC AUTO-ENTMCNC: 33.9 G/DL (ref 31.5–35.7)
MCV RBC AUTO: 92 FL (ref 79–97)
MONOCYTES # BLD AUTO: 0.3 X10E3/UL (ref 0.1–0.9)
MONOCYTES NFR BLD AUTO: 7 %
NEUTROPHILS # BLD AUTO: 2.9 X10E3/UL (ref 1.4–7)
NEUTROPHILS NFR BLD AUTO: 64 %
PLATELET # BLD AUTO: 179 X10E3/UL (ref 150–450)
PROT SERPL-MCNC: 7.4 G/DL (ref 6–8.5)
RBC # BLD AUTO: 4.86 X10E6/UL (ref 3.77–5.28)
TACROLIMUS BLD LC/MS/MS-MCNC: 3.7 NG/ML (ref 2–20)
WBC # BLD AUTO: 4.5 X10E3/UL (ref 3.4–10.8)

## 2023-10-02 PROCEDURE — G8400 PT W/DXA NO RESULTS DOC: HCPCS | Performed by: PHYSICIAN ASSISTANT

## 2023-10-02 PROCEDURE — 99213 OFFICE O/P EST LOW 20 MIN: CPT | Performed by: PHYSICIAN ASSISTANT

## 2023-10-02 PROCEDURE — G8427 DOCREV CUR MEDS BY ELIG CLIN: HCPCS | Performed by: PHYSICIAN ASSISTANT

## 2023-10-02 PROCEDURE — 1090F PRES/ABSN URINE INCON ASSESS: CPT | Performed by: PHYSICIAN ASSISTANT

## 2023-10-02 PROCEDURE — G8484 FLU IMMUNIZE NO ADMIN: HCPCS | Performed by: PHYSICIAN ASSISTANT

## 2023-10-02 PROCEDURE — 1123F ACP DISCUSS/DSCN MKR DOCD: CPT | Performed by: PHYSICIAN ASSISTANT

## 2023-10-02 PROCEDURE — 1036F TOBACCO NON-USER: CPT | Performed by: PHYSICIAN ASSISTANT

## 2023-10-02 PROCEDURE — 3017F COLORECTAL CA SCREEN DOC REV: CPT | Performed by: PHYSICIAN ASSISTANT

## 2023-10-02 PROCEDURE — G8420 CALC BMI NORM PARAMETERS: HCPCS | Performed by: PHYSICIAN ASSISTANT

## 2023-10-02 ASSESSMENT — PATIENT HEALTH QUESTIONNAIRE - PHQ9
SUM OF ALL RESPONSES TO PHQ QUESTIONS 1-9: 0
SUM OF ALL RESPONSES TO PHQ9 QUESTIONS 1 & 2: 0
2. FEELING DOWN, DEPRESSED OR HOPELESS: 0
SUM OF ALL RESPONSES TO PHQ QUESTIONS 1-9: 0
1. LITTLE INTEREST OR PLEASURE IN DOING THINGS: 0

## 2023-10-02 NOTE — PROGRESS NOTES
Identified pt with two pt identifiers(name and ). Reviewed record in preparation for visit and have obtained necessary documentation. Chief Complaint   Patient presents with    Other     VV Follow up     There were no vitals taken for this visit. 1. \"Have you been to the ER, urgent care clinic since your last visit? Hospitalized since your last visit? \" No    2. \"Have you seen or consulted any other health care providers outside of the [address] [place] [address] since your last visit? \" No     Patient is accompanied by Self I have received verbal consent from Jessica Mcghee to discuss any/all medical information while they are present in the room.
enzymes have remained normal from late 2017 until 5/2022. This was about 1 month following COVID booster and 3 months following rabies vaccination. A repeat liver biopsy in 8/2022 demonstrated mild portal inflammation, mild interface activity, no PMN and stage 3 bridging fibrosis. Suspect COVID vaccine (or possibly rabies) initiated an immune response with elevation in liver enzymes. There was another elevation of enzymes following additional COVID booster in 5/2023 which have normalized in the past 3 months. For now, she will continue with tac 1 mg bid and plan on repeat labs in 3 months. Hepatic cyst  She has a relatively large right lobe hepatic cyst.  This is potentially the source for RUQ pain/pressure symptoms in the past and she is asymptomatic at present. She has been evaluated and has elected to defer at present any drainage of the cyst for the time being. I have asked her to let me know if this increases in symptom or if she would like to proceed with this procedure. She understands that the fluid would likely recur following drainage, but if her symptoms improved, this would localize the pain to the cyst and additional intervention could be considered. Recent CT scan in 5/2023 had shown no change in the size of this cyst. Will plan on annual evaluation pending any change in her symptoms. SMA dissection  She has seen vascular surgeon, Dr Barron Piña, who was not concerned about the dissection, but has scheduled her for follow-up CT for 5/2023 (he ordered) to ensure no change over time. This was performed and is unchanged, no intervention recommended by vascular. Skin Cancer  She developed a SCCA for the face in 2021. She has regular follow-up with dermatology every 6 months and has done recent round of Effudex for hairline treatment. Screening for Hepatocellular Carcinoma  HCC screening is not necessary if the patient has no evidence of cirrhosis.  Last tumor markers in

## 2024-01-04 ENCOUNTER — CLINICAL DOCUMENTATION (OUTPATIENT)
Age: 74
End: 2024-01-04

## 2024-01-08 ENCOUNTER — CLINICAL DOCUMENTATION (OUTPATIENT)
Age: 74
End: 2024-01-08

## 2024-01-09 ENCOUNTER — CLINICAL DOCUMENTATION (OUTPATIENT)
Age: 74
End: 2024-01-09

## 2024-01-09 DIAGNOSIS — K75.4 AUTOIMMUNE HEPATITIS (HCC): Primary | ICD-10-CM

## 2024-02-13 RX ORDER — TACROLIMUS 1 MG/1
1 CAPSULE ORAL 2 TIMES DAILY
Qty: 180 CAPSULE | Refills: 3 | Status: SHIPPED | OUTPATIENT
Start: 2024-02-13

## 2024-04-01 DIAGNOSIS — K75.4 AUTOIMMUNE HEPATITIS (HCC): ICD-10-CM

## 2024-04-05 LAB
ERYTHROCYTE [DISTWIDTH] IN BLOOD BY AUTOMATED COUNT: 12.2 % (ref 11.7–15.4)
HCT VFR BLD AUTO: 44.9 % (ref 34–46.6)
HGB BLD-MCNC: 15 G/DL (ref 11.1–15.9)
MCH RBC QN AUTO: 31.1 PG (ref 26.6–33)
MCHC RBC AUTO-ENTMCNC: 33.4 G/DL (ref 31.5–35.7)
MCV RBC AUTO: 93 FL (ref 79–97)
PLATELET # BLD AUTO: 192 X10E3/UL (ref 150–450)
RBC # BLD AUTO: 4.83 X10E6/UL (ref 3.77–5.28)
WBC # BLD AUTO: 4.5 X10E3/UL (ref 3.4–10.8)

## 2024-04-06 LAB
ALBUMIN SERPL-MCNC: 5 G/DL (ref 3.8–4.8)
ALP SERPL-CCNC: 53 IU/L (ref 44–121)
ALT SERPL-CCNC: 37 IU/L (ref 0–32)
AST SERPL-CCNC: 33 IU/L (ref 0–40)
BILIRUB DIRECT SERPL-MCNC: 0.12 MG/DL (ref 0–0.4)
BILIRUB SERPL-MCNC: 0.4 MG/DL (ref 0–1.2)
BUN SERPL-MCNC: 20 MG/DL (ref 8–27)
BUN/CREAT SERPL: 23 (ref 12–28)
CALCIUM SERPL-MCNC: 10 MG/DL (ref 8.7–10.3)
CHLORIDE SERPL-SCNC: 102 MMOL/L (ref 96–106)
CO2 SERPL-SCNC: 23 MMOL/L (ref 20–29)
CREAT SERPL-MCNC: 0.87 MG/DL (ref 0.57–1)
EGFRCR SERPLBLD CKD-EPI 2021: 70 ML/MIN/1.73
GLUCOSE SERPL-MCNC: 110 MG/DL (ref 70–99)
POTASSIUM SERPL-SCNC: 4.7 MMOL/L (ref 3.5–5.2)
PROT SERPL-MCNC: 7.5 G/DL (ref 6–8.5)
SODIUM SERPL-SCNC: 140 MMOL/L (ref 134–144)

## 2024-04-07 LAB — TACROLIMUS BLD LC/MS/MS-MCNC: 3.8 NG/ML (ref 2–20)

## 2024-04-18 ENCOUNTER — TELEMEDICINE (OUTPATIENT)
Age: 74
End: 2024-04-18
Payer: MEDICARE

## 2024-04-18 DIAGNOSIS — K75.4 AUTOIMMUNE HEPATITIS (HCC): Primary | ICD-10-CM

## 2024-04-18 PROCEDURE — 99214 OFFICE O/P EST MOD 30 MIN: CPT | Performed by: PHYSICIAN ASSISTANT

## 2024-04-18 PROCEDURE — 1123F ACP DISCUSS/DSCN MKR DOCD: CPT | Performed by: PHYSICIAN ASSISTANT

## 2024-04-18 PROCEDURE — G8400 PT W/DXA NO RESULTS DOC: HCPCS | Performed by: PHYSICIAN ASSISTANT

## 2024-04-18 PROCEDURE — G8427 DOCREV CUR MEDS BY ELIG CLIN: HCPCS | Performed by: PHYSICIAN ASSISTANT

## 2024-04-18 PROCEDURE — 1090F PRES/ABSN URINE INCON ASSESS: CPT | Performed by: PHYSICIAN ASSISTANT

## 2024-04-18 PROCEDURE — 3017F COLORECTAL CA SCREEN DOC REV: CPT | Performed by: PHYSICIAN ASSISTANT

## 2024-04-18 NOTE — PROGRESS NOTES
Room:  I have reviewed all needed documentation in preparation for visit. Verified patient by name and date of birth  Chief Complaint   Patient presents with    Follow-up     6 month       There were no vitals filed for this visit.     Health Maintenance Due   Topic Date Due    Pneumococcal 65+ years Vaccine (1 of 2 - PCV) Never done    Hepatitis C screen  Never done    DTaP/Tdap/Td vaccine (1 - Tdap) Never done    Colorectal Cancer Screen  Never done    Breast cancer screen  Never done    DEXA (modify frequency per FRAX score)  Never done    Respiratory Syncytial Virus (RSV) Pregnant or age 60 yrs+ (1 - 1-dose 60+ series) Never done    COVID-19 Vaccine (4 - 2023-24 season) 09/01/2023    Annual Wellness Visit (Medicare)  Never done

## 2024-04-18 NOTE — PROGRESS NOTES
[facility]      Pilo Hernandez MD, FACP, FACG, FAASLD      CHRISTO Mendez-BETHANIE Miranda, [facility]   Ayesha Ratlluvia, [facility]   Nandini Cheney, FNP-C   Amarjit Stockton, FNP-C    Kimberley Gomez, [facility]    at [facility]    [address], [address]    [place]  [address] [phone]    FAX: [phone]   [facility]    at [facility]    [address], [address]    [place]  [address] [phone]    FAX: [phone]     Patient Care Team:  Reina Cummings MD as PCP - General    Patient Active Problem List   Diagnosis    Hypertension    Hypercholesterolemia    Autoimmune hepatitis (HCC)    Hepatic cyst     VIRTUAL TELEHEALTH VISIT PERFORMED DUE TO COVID-19 EPIDEMIC    CONSENT:  Savannah Rivero, who was evaluated through a synchronous (real-time) audio-video encounter, and/or his healthcare decision maker, is aware that it is a billable service, which includes applicable co-pays, with coverage as determined by his insurance carrier. He provided verbal consent to proceed and patient identification was verified. This visit was conducted pursuant to the emergency declaration under the Dumas Act and the National Emergencies Act, 1135 waiver authority and the Coronavirus Preparedness and Response Supplemental Appropriations Act. A caregiver was present when appropriate. Ability to conduct physical exam was limited. The patient was located at home in a state where the provider was licensed to provide care.       Savannah Rivero is being seen at [facility] for management of Autoimmune Hepatitis.  The active problem list, all pertinent past medical history, medications, liver histology, radiologic findings and laboratory findings related to the liver disorder were reviewed

## 2024-07-24 LAB
ERYTHROCYTE [DISTWIDTH] IN BLOOD BY AUTOMATED COUNT: 12.4 % (ref 11.7–15.4)
HCT VFR BLD AUTO: 46.4 % (ref 34–46.6)
HGB BLD-MCNC: 15 G/DL (ref 11.1–15.9)
MCH RBC QN AUTO: 30.3 PG (ref 26.6–33)
MCHC RBC AUTO-ENTMCNC: 32.3 G/DL (ref 31.5–35.7)
MCV RBC AUTO: 94 FL (ref 79–97)
PLATELET # BLD AUTO: 185 X10E3/UL (ref 150–450)
RBC # BLD AUTO: 4.95 X10E6/UL (ref 3.77–5.28)
WBC # BLD AUTO: 4 X10E3/UL (ref 3.4–10.8)

## 2024-07-25 LAB
ALBUMIN SERPL-MCNC: 4.9 G/DL (ref 3.8–4.8)
ALP SERPL-CCNC: 53 IU/L (ref 44–121)
ALT SERPL-CCNC: 43 IU/L (ref 0–32)
AST SERPL-CCNC: 38 IU/L (ref 0–40)
BILIRUB DIRECT SERPL-MCNC: 0.12 MG/DL (ref 0–0.4)
BILIRUB SERPL-MCNC: 0.4 MG/DL (ref 0–1.2)
BUN SERPL-MCNC: 18 MG/DL (ref 8–27)
BUN/CREAT SERPL: 21 (ref 12–28)
CALCIUM SERPL-MCNC: 9.9 MG/DL (ref 8.7–10.3)
CHLORIDE SERPL-SCNC: 101 MMOL/L (ref 96–106)
CO2 SERPL-SCNC: 21 MMOL/L (ref 20–29)
CREAT SERPL-MCNC: 0.87 MG/DL (ref 0.57–1)
EGFRCR SERPLBLD CKD-EPI 2021: 70 ML/MIN/1.73
GLUCOSE SERPL-MCNC: 106 MG/DL (ref 70–99)
POTASSIUM SERPL-SCNC: 5.1 MMOL/L (ref 3.5–5.2)
PROT SERPL-MCNC: 7.4 G/DL (ref 6–8.5)
SODIUM SERPL-SCNC: 138 MMOL/L (ref 134–144)

## 2024-07-25 NOTE — RESULT ENCOUNTER NOTE
Pt notified via [employer] message of stable findings. Continue with tac 1 mg bid and follow-up as scheduled in 3 months with repeat labs. .

## 2024-07-26 ENCOUNTER — CLINICAL DOCUMENTATION (OUTPATIENT)
Age: 74
End: 2024-07-26

## 2024-07-26 LAB — TACROLIMUS BLD LC/MS/MS-MCNC: 4.5 NG/ML (ref 2–20)

## 2024-10-02 LAB
BUN SERPL-MCNC: 27 MG/DL (ref 8–27)
BUN/CREAT SERPL: 30 (ref 12–28)
CALCIUM SERPL-MCNC: 9.7 MG/DL (ref 8.7–10.3)
CHLORIDE SERPL-SCNC: 102 MMOL/L (ref 96–106)
CO2 SERPL-SCNC: 23 MMOL/L (ref 20–29)
CREAT SERPL-MCNC: 0.9 MG/DL (ref 0.57–1)
EGFRCR SERPLBLD CKD-EPI 2021: 68 ML/MIN/1.73
ERYTHROCYTE [DISTWIDTH] IN BLOOD BY AUTOMATED COUNT: 11.9 % (ref 11.7–15.4)
GLUCOSE SERPL-MCNC: 103 MG/DL (ref 70–99)
HCT VFR BLD AUTO: 46.4 % (ref 34–46.6)
HGB BLD-MCNC: 14.4 G/DL (ref 11.1–15.9)
MCH RBC QN AUTO: 29.9 PG (ref 26.6–33)
MCHC RBC AUTO-ENTMCNC: 31 G/DL (ref 31.5–35.7)
MCV RBC AUTO: 97 FL (ref 79–97)
PLATELET # BLD AUTO: 198 X10E3/UL (ref 150–450)
POTASSIUM SERPL-SCNC: 4.9 MMOL/L (ref 3.5–5.2)
RBC # BLD AUTO: 4.81 X10E6/UL (ref 3.77–5.28)
SODIUM SERPL-SCNC: 139 MMOL/L (ref 134–144)
WBC # BLD AUTO: 4 X10E3/UL (ref 3.4–10.8)

## 2024-10-03 LAB
ALBUMIN SERPL-MCNC: 4.5 G/DL (ref 3.8–4.8)
ALP SERPL-CCNC: 48 IU/L (ref 44–121)
ALT SERPL-CCNC: 135 IU/L (ref 0–32)
AST SERPL-CCNC: 107 IU/L (ref 0–40)
BILIRUB DIRECT SERPL-MCNC: 0.1 MG/DL (ref 0–0.4)
BILIRUB SERPL-MCNC: 0.3 MG/DL (ref 0–1.2)
PROT SERPL-MCNC: 7.3 G/DL (ref 6–8.5)

## 2024-10-05 LAB — TACROLIMUS BLD LC/MS/MS-MCNC: 3.1 NG/ML (ref 2–20)

## 2024-10-16 ENCOUNTER — TELEMEDICINE (OUTPATIENT)
Age: 74
End: 2024-10-16
Payer: MEDICARE

## 2024-10-16 DIAGNOSIS — K75.4 AUTOIMMUNE HEPATITIS (HCC): Primary | ICD-10-CM

## 2024-10-16 PROCEDURE — 1090F PRES/ABSN URINE INCON ASSESS: CPT | Performed by: PHYSICIAN ASSISTANT

## 2024-10-16 PROCEDURE — G8400 PT W/DXA NO RESULTS DOC: HCPCS | Performed by: PHYSICIAN ASSISTANT

## 2024-10-16 PROCEDURE — 99214 OFFICE O/P EST MOD 30 MIN: CPT | Performed by: PHYSICIAN ASSISTANT

## 2024-10-16 PROCEDURE — G8427 DOCREV CUR MEDS BY ELIG CLIN: HCPCS | Performed by: PHYSICIAN ASSISTANT

## 2024-10-16 PROCEDURE — 1123F ACP DISCUSS/DSCN MKR DOCD: CPT | Performed by: PHYSICIAN ASSISTANT

## 2024-10-16 PROCEDURE — 3017F COLORECTAL CA SCREEN DOC REV: CPT | Performed by: PHYSICIAN ASSISTANT

## 2024-10-16 NOTE — PROGRESS NOTES
Chief Complaint   Patient presents with    Follow-up     There were no vitals filed for this visit.  \"Have you been to the ER, urgent care clinic since your last visit?  Hospitalized since your last visit?\"    NO    “Have you seen or consulted any other health care providers outside our system since your last visit?”    NO    Have you had a mammogram?”   YES - Where: [facility] Nurse/CMA to request most recent records if not in the chart    No breast cancer screening on file       “Have you had a colorectal cancer screening such as a colonoscopy/FIT/Cologuard?    NO    No colonoscopy on file  No cologuard on file  No FIT/FOBT on file   No flexible sigmoidoscopy on file            
11.4   Anti-SLA 0.0 - 20.0 units 1.0   LKM 0.0 - 20.0 Units 1.4     10/2015.  Anti-HCV negative, EZRA negative, ASMA positive    LIVER HISTOLOGY:  11/2015.  Slides reviewed by MLS.  Severe active hepatitis with PMN, bridging necrosis and bridging fibrosis.      4/2018.  FibroScan performed at [facility]. EkPa was 10.9.  Suggested fibrosis level is F3.  CAP score is 324, suggestive of steatosis.    10/2019.  FibroScan performed at [facility]. EkPa was 6.5.  Suggested fibrosis level is F0-1.  CAP score 268, this is consistent with steatosis.    8/2022.  Slides reviewed by MLS.  Mild portal inflammation, with Mild interface hepatitis, with No PMN.  Bile ducts are normal.  No lobular inflammation.  No steatosis.  Krista stage 4 fibrosis.  Metavir stage 3 fibrosis.  Knodell score (1013).    ENDOSCOPIC PROCEDURES:  Not available or performed    RADIOLOGY:  7/2015.  Ultrasound of abdomen.  5.6 cm right lobe mildly septated cyst.   9/2022.  Ultrasound of liver.   The liver is normal in echotexture with no mass. In the right lobe of liver  there is a 7.9 x 7.8 x 7.3 cm simple cyst..   10/2022.  CT scan abdomen with and without IV contrast.The liver measures 18.5 cm longitudinal. There is a cyst of the RIGHT hepatic lobe segment 6 which measures 9.2 x 7.6 cm on series 5, image 35. It measures 8.3 cm craniocaudal on coronal series 601, slice 59. The cyst measures simple fluid density with no enhancing components. The wall is thin and there is a single, thin septation observed along the anterior margin. No mural nodularity is identified. The cyst abuts the main portal vein and the RIGHT portal vein without narrowing or occlusion. There is a 5 cm simple cyst of the LEFT kidney upper pole which requires no further follow-up. 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  5/2023. CT abdomen at [facility]. Superior mesenteric artery dissection appears

## 2024-11-01 LAB
ERYTHROCYTE [DISTWIDTH] IN BLOOD BY AUTOMATED COUNT: 11.4 % (ref 11.7–15.4)
HCT VFR BLD AUTO: 44.5 % (ref 34–46.6)
HGB BLD-MCNC: 14.4 G/DL (ref 11.1–15.9)
MCH RBC QN AUTO: 30.4 PG (ref 26.6–33)
MCHC RBC AUTO-ENTMCNC: 32.4 G/DL (ref 31.5–35.7)
MCV RBC AUTO: 94 FL (ref 79–97)
PLATELET # BLD AUTO: 213 X10E3/UL (ref 150–450)
RBC # BLD AUTO: 4.74 X10E6/UL (ref 3.77–5.28)
WBC # BLD AUTO: 6.7 X10E3/UL (ref 3.4–10.8)

## 2024-11-02 LAB
ALBUMIN SERPL-MCNC: 4.7 G/DL (ref 3.8–4.8)
ALP SERPL-CCNC: 52 IU/L (ref 44–121)
ALT SERPL-CCNC: 56 IU/L (ref 0–32)
AST SERPL-CCNC: 31 IU/L (ref 0–40)
BILIRUB DIRECT SERPL-MCNC: 0.16 MG/DL (ref 0–0.4)
BILIRUB SERPL-MCNC: 0.5 MG/DL (ref 0–1.2)
BUN SERPL-MCNC: 20 MG/DL (ref 8–27)
BUN/CREAT SERPL: 22 (ref 12–28)
CALCIUM SERPL-MCNC: 9.7 MG/DL (ref 8.7–10.3)
CHLORIDE SERPL-SCNC: 103 MMOL/L (ref 96–106)
CO2 SERPL-SCNC: 22 MMOL/L (ref 20–29)
CREAT SERPL-MCNC: 0.91 MG/DL (ref 0.57–1)
EGFRCR SERPLBLD CKD-EPI 2021: 67 ML/MIN/1.73
GLUCOSE SERPL-MCNC: 85 MG/DL (ref 70–99)
POTASSIUM SERPL-SCNC: 4.7 MMOL/L (ref 3.5–5.2)
PROT SERPL-MCNC: 7.4 G/DL (ref 6–8.5)
SODIUM SERPL-SCNC: 140 MMOL/L (ref 134–144)

## 2024-11-03 LAB — TACROLIMUS BLD LC/MS/MS-MCNC: 3 NG/ML (ref 2–20)

## 2024-12-30 LAB
ERYTHROCYTE [DISTWIDTH] IN BLOOD BY AUTOMATED COUNT: 11.7 % (ref 11.7–15.4)
HCT VFR BLD AUTO: 45.1 % (ref 34–46.6)
HGB BLD-MCNC: 14.4 G/DL (ref 11.1–15.9)
MCH RBC QN AUTO: 29.6 PG (ref 26.6–33)
MCHC RBC AUTO-ENTMCNC: 31.9 G/DL (ref 31.5–35.7)
MCV RBC AUTO: 93 FL (ref 79–97)
PLATELET # BLD AUTO: 180 X10E3/UL (ref 150–450)
RBC # BLD AUTO: 4.87 X10E6/UL (ref 3.77–5.28)
WBC # BLD AUTO: 5.9 X10E3/UL (ref 3.4–10.8)

## 2024-12-31 LAB
ALBUMIN SERPL-MCNC: 4.5 G/DL (ref 3.8–4.8)
ALP SERPL-CCNC: 82 IU/L (ref 44–121)
ALT SERPL-CCNC: 34 IU/L (ref 0–32)
AST SERPL-CCNC: 31 IU/L (ref 0–40)
BILIRUB DIRECT SERPL-MCNC: 0.23 MG/DL (ref 0–0.4)
BILIRUB SERPL-MCNC: 0.6 MG/DL (ref 0–1.2)
BUN SERPL-MCNC: 16 MG/DL (ref 8–27)
BUN/CREAT SERPL: 17 (ref 12–28)
CALCIUM SERPL-MCNC: 9.8 MG/DL (ref 8.7–10.3)
CHLORIDE SERPL-SCNC: 100 MMOL/L (ref 96–106)
CO2 SERPL-SCNC: 23 MMOL/L (ref 20–29)
CREAT SERPL-MCNC: 0.92 MG/DL (ref 0.57–1)
EGFRCR SERPLBLD CKD-EPI 2021: 66 ML/MIN/1.73
GLUCOSE SERPL-MCNC: 115 MG/DL (ref 70–99)
POTASSIUM SERPL-SCNC: 4.5 MMOL/L (ref 3.5–5.2)
PROT SERPL-MCNC: 7 G/DL (ref 6–8.5)
SODIUM SERPL-SCNC: 137 MMOL/L (ref 134–144)

## 2025-01-02 LAB — TACROLIMUS BLD LC/MS/MS-MCNC: 3.4 NG/ML (ref 2–20)

## 2025-01-06 ENCOUNTER — ANESTHESIA EVENT (OUTPATIENT)
Facility: HOSPITAL | Age: 75
End: 2025-01-06
Payer: MEDICARE

## 2025-01-06 ENCOUNTER — ANESTHESIA (OUTPATIENT)
Facility: HOSPITAL | Age: 75
End: 2025-01-06
Payer: MEDICARE

## 2025-01-06 ENCOUNTER — HOSPITAL ENCOUNTER (OUTPATIENT)
Facility: HOSPITAL | Age: 75
Setting detail: OUTPATIENT SURGERY
Discharge: HOME | End: 2025-01-06
Attending: INTERNAL MEDICINE | Admitting: INTERNAL MEDICINE
Payer: MEDICARE

## 2025-01-06 VITALS
DIASTOLIC BLOOD PRESSURE: 61 MMHG | HEART RATE: 66 BPM | SYSTOLIC BLOOD PRESSURE: 138 MMHG | HEIGHT: 62 IN | WEIGHT: 132 LBS | RESPIRATION RATE: 18 BRPM | OXYGEN SATURATION: 99 % | BODY MASS INDEX: 24.29 KG/M2 | TEMPERATURE: 97.7 F

## 2025-01-06 PROCEDURE — 3700000000 HC ANESTHESIA ATTENDED CARE: Performed by: INTERNAL MEDICINE

## 2025-01-06 PROCEDURE — 7100000010 HC PHASE II RECOVERY - FIRST 15 MIN: Performed by: INTERNAL MEDICINE

## 2025-01-06 PROCEDURE — 7100000011 HC PHASE II RECOVERY - ADDTL 15 MIN: Performed by: INTERNAL MEDICINE

## 2025-01-06 PROCEDURE — 3700000001 HC ADD 15 MINUTES (ANESTHESIA): Performed by: INTERNAL MEDICINE

## 2025-01-06 PROCEDURE — 3600007502: Performed by: INTERNAL MEDICINE

## 2025-01-06 PROCEDURE — 2709999900 HC NON-CHARGEABLE SUPPLY: Performed by: INTERNAL MEDICINE

## 2025-01-06 PROCEDURE — 3600007512: Performed by: INTERNAL MEDICINE

## 2025-01-06 PROCEDURE — 88305 TISSUE EXAM BY PATHOLOGIST: CPT

## 2025-01-06 PROCEDURE — 6360000002 HC RX W HCPCS

## 2025-01-06 PROCEDURE — 2580000003 HC RX 258

## 2025-01-06 RX ORDER — SODIUM CHLORIDE 0.9 % (FLUSH) 0.9 %
5-40 SYRINGE (ML) INJECTION EVERY 12 HOURS SCHEDULED
Status: CANCELLED | OUTPATIENT
Start: 2025-01-06

## 2025-01-06 RX ORDER — SODIUM CHLORIDE 9 MG/ML
INJECTION, SOLUTION INTRAVENOUS
Status: DISCONTINUED | OUTPATIENT
Start: 2025-01-06 | End: 2025-01-06 | Stop reason: SDUPTHER

## 2025-01-06 RX ORDER — SODIUM CHLORIDE 9 MG/ML
INJECTION, SOLUTION INTRAVENOUS PRN
Status: CANCELLED | OUTPATIENT
Start: 2025-01-06

## 2025-01-06 RX ORDER — SODIUM CHLORIDE 9 MG/ML
INJECTION, SOLUTION INTRAVENOUS CONTINUOUS
Status: CANCELLED | OUTPATIENT
Start: 2025-01-06

## 2025-01-06 RX ORDER — SODIUM CHLORIDE 0.9 % (FLUSH) 0.9 %
5-40 SYRINGE (ML) INJECTION PRN
Status: CANCELLED | OUTPATIENT
Start: 2025-01-06

## 2025-01-06 RX ADMIN — PROPOFOL 30 MG: 10 INJECTION, EMULSION INTRAVENOUS at 08:30

## 2025-01-06 RX ADMIN — PROPOFOL 30 MG: 10 INJECTION, EMULSION INTRAVENOUS at 08:34

## 2025-01-06 RX ADMIN — PROPOFOL 30 MG: 10 INJECTION, EMULSION INTRAVENOUS at 08:27

## 2025-01-06 RX ADMIN — SODIUM CHLORIDE: 9 INJECTION, SOLUTION INTRAVENOUS at 08:17

## 2025-01-06 RX ADMIN — PROPOFOL 30 MG: 10 INJECTION, EMULSION INTRAVENOUS at 08:24

## 2025-01-06 RX ADMIN — PROPOFOL 80 MG: 10 INJECTION, EMULSION INTRAVENOUS at 08:20

## 2025-01-06 RX ADMIN — LIDOCAINE HYDROCHLORIDE 50 MG: 20 INJECTION, SOLUTION EPIDURAL; INFILTRATION; INTRACAUDAL; PERINEURAL at 08:20

## 2025-01-06 RX ADMIN — PROPOFOL 30 MG: 10 INJECTION, EMULSION INTRAVENOUS at 08:37

## 2025-01-06 ASSESSMENT — PAIN SCALES - GENERAL
PAINLEVEL_OUTOF10: 0

## 2025-01-06 NOTE — ANESTHESIA POSTPROCEDURE EVALUATION
Department of Anesthesiology  Postprocedure Note    Patient: Savannah Rivero  MRN: [id number]  YOB: 1950  Date of evaluation: 1/6/2025    Procedure Summary       Date: 01/06/25 Room / Location: [facility] ENDO 04 / [facility] ENDOSCOPY    Anesthesia Start: 0817 Anesthesia Stop: 0841    Procedure: COLONOSCOPY (Lower GI Region) Diagnosis:       Special screening for malignant neoplasms, colon      (Special screening for malignant neoplasms, colon [Z12.11])    Surgeons: Deanne Carranza MD Responsible Provider: Efrain Harrison MD    Anesthesia Type: MAC ASA Status: 2            Anesthesia Type: MAC    George Phase I: George Score: 10    George Phase II: George Score: 10    Anesthesia Post Evaluation  Post-Anesthesia Evaluation and Assessment    Patient: Savannah Rivero MRN: [id number]  SSN: [id number]    YOB: 1950  Age: 74 y.o.  Sex: female      I have evaluated the patient and they are stable and ready for discharge from the PACU.     Cardiovascular Function/Vital Signs  Visit Vitals  /61   Pulse 66   Temp 97.7 °F (36.5 °C) (Temporal)   Resp 18   Ht 1.575 m (5' 2\")   Wt 59.9 kg (132 lb)   SpO2 99%   BMI 24.14 kg/m²       Patient is status post Monitor Anesthesia Care anesthesia for Procedure(s):  COLONOSCOPY.    Nausea/Vomiting: None    Postoperative hydration reviewed and adequate.    Pain:      Managed    Neurological Status:       At baseline    Mental Status, Level of Consciousness: Alert and  oriented to person, place, and time    Pulmonary Status:       Adequate oxygenation and airway patent    Complications related to anesthesia: None    Post-anesthesia assessment completed. No concerns    Signed By: Efrain Harrison MD     January 6, 2025                No notable events documented.

## 2025-01-06 NOTE — OP NOTE
[facility]  [address]  [place] [address] [phone]                              Colonoscopy Procedure Note      Indications:  Screening colonoscopy      :  Deanne Carranza MD    Staff: Circulator: Claudia Plunkett RN  Endoscopy Technician: David Whitehead    Referring Provider: Kerri Sidhu MD    Sedation:  MAC    Procedure Details:  After informed consent was obtained with all risks and benefits of procedure explained and preoperative exam completed, the patient was taken to the endoscopy suite and placed in the left lateral decubitus position.  Upon sequential sedation as per above, a digital rectal exam was performed per below.  The Olympus videocolonoscope was inserted in the rectum and carefully advanced to the ileum.  The quality of preparation was good.  Sheldon Bowel Prep Score : 3/3/3.The colonoscope was slowly withdrawn with careful evaluation between folds. Retroflexion in the rectum was performed.     Findings:   Rectum: medium internal hemorrhoids   Sigmoid: mild diverticulosis   Descending Colon: mild diverticulosis   Transverse Colon: mild diverticulosis   Ascending Colon: mild diverticulosis, two 5 mm sessile polyps - removed with cold snare, one 2 mm sessile polyp - removed with forceps   Cecum: normal   Terminal Ileum: normal     Interventions:  polypectomy    Specimen Removed:    ID Type Source Tests Collected by Time Destination   1 : Ascending colon polyps Tissue Colon-Ascending SURGICAL PATHOLOGY Deanne Carranza MD 1/6/2025 0830        Complications: None.     EBL:  minimal     Impression:    See Postoperative diagnosis above    Recommendations:   - Await pathology. You should receive a letter within 2 weeks.   - Resume normal medications.  - Recommend repeat colonoscopy in 3-5 years pending pathology.     Discharge Disposition:  Home in the company of a  when able to ambulate.    Deanne Carranza MD  1/6/2025  8:45 AM

## 2025-01-06 NOTE — ANESTHESIA PRE PROCEDURE
Department of Anesthesiology  Preprocedure Note       Name:  Savannah Rivero   Age:  74 y.o.  :  1950                                          MRN:  [id number]         Date:  2025      Surgeon: Surgeon(s):  Deanne Carranza MD    Procedure: Procedure(s):  COLONOSCOPY    Medications prior to admission:   Prior to Admission medications    Medication Sig Start Date End Date Taking? Authorizing Provider   tacrolimus (PROGRAF) 1 MG capsule Take 1 capsule by mouth 2 times daily 24  Yes Elizabeth George PA   lisinopril (PRINIVIL;ZESTRIL) 20 MG tablet Take 1 tablet by mouth daily   Yes Automatic Reconciliation, Ar   rosuvastatin (CRESTOR) 10 MG tablet Take 1 tablet by mouth daily 17  Yes Automatic Reconciliation, Ar   vitamin D 25 MCG (1000 UT) CAPS Take 2 capsules by mouth daily    Automatic Reconciliation, Ar       Current medications:    No current facility-administered medications for this encounter.       Allergies:  No Known Allergies    Problem List:    Patient Active Problem List   Diagnosis Code   • Hypertension I10   • Hypercholesterolemia E78.00   • Autoimmune hepatitis (HCC) K75.4   • Hepatic cyst K76.89       Past Medical History:        Diagnosis Date   • Arthritis     midfoot - bilateral - cortisone shots twice a yr   • Autoimmune disease (HCC)    • Hypercholesterolemia    • Hypertension    • Ill-defined condition     elevated cholesterol   • Liver disease     autoimmune hepatitis       Past Surgical History:        Procedure Laterality Date   • BIOPSY LIVER  2015   • CATARACT REMOVAL Left 2019    Dr. Chevy Ragsdale   • COLONOSCOPY     • EYE SURGERY         Social History:    Social History     Tobacco Use   • Smoking status: Never   • Smokeless tobacco: Never   Substance Use Topics   • Alcohol use: Yes     Alcohol/week: 4.0 standard drinks of alcohol     Types: 4 Glasses of wine per week     Comment: Social                                Counseling given: Not

## 2025-01-06 NOTE — PERIOP NOTE
Initial RN admission and assessment performed and documented in Endoscopy navigator.     Patient evaluated by anesthesia in pre-procedure holding.     All procedural vital signs, airway assessment, and level of consciousness information monitored and recorded by anesthesia staff on the anesthesia record.     Report received from CRNA post procedure.  Patient transported to recovery area by RN.    Endoscopy post procedure time out was performed and specimens were verified with physician.    Endoscope was pre-cleaned at bedside immediately following procedure by ROCKY Whitehead.

## 2025-01-06 NOTE — H&P
[facility]  [address]  [place] [address] [phone]                                History and Physical     NAME: Savannah Rivero   :  1950   MRN:  [id number]     HPI:  The patient was seen and examined.    Past Surgical History:   Procedure Laterality Date    BIOPSY LIVER  2015    CATARACT REMOVAL Left 2019    Dr. Chevy Ragsdale    COLONOSCOPY  2014    EYE SURGERY       Past Medical History:   Diagnosis Date    Arthritis     midfoot - bilateral - cortisone shots twice a yr    Autoimmune disease (HCC)     Hypercholesterolemia     Hypertension     Ill-defined condition     elevated cholesterol    Liver disease     autoimmune hepatitis     Social History     Tobacco Use    Smoking status: Never    Smokeless tobacco: Never   Vaping Use    Vaping status: Never Used   Substance Use Topics    Alcohol use: Yes     Alcohol/week: 4.0 standard drinks of alcohol     Types: 4 Glasses of wine per week     Comment: Social    Drug use: Never     No Known Allergies  Family History   Problem Relation Age of Onset    Macular Degen Mother     Hearing Impairment Mother     Hearing Loss Mother     Vision Loss Mother      No current facility-administered medications for this encounter.         PHYSICAL EXAM:  General: WD, WN. Alert, cooperative, no acute distress    HEENT: NC, Atraumatic.  PERRLA, EOMI. Anicteric sclerae.  Lungs:  CTA Bilaterally. No Wheezing/Rhonchi/Rales.  Heart:  Regular  rhythm,  No murmur, No Rubs, No Gallops  Abdomen: Soft, Non distended, Non tender.  +Bowel sounds, no HSM  Extremities: No c/c/e  Neurologic:  CN 2-12 gi, Alert and oriented X 3.  No acute neurological distress   Psych:   Good insight. Not anxious nor agitated.    The heart, lungs and mental status were satisfactory for the administration of mac sedation and for the procedure.      Mallampati score: 2     The patient was counseled at length about the risks of courtney Covid-19 in the

## 2025-01-06 NOTE — DISCHARGE INSTRUCTIONS
[facility]  [phone]                                  Savannah Rivero  [id number]  1950    It was my pleasure seeing you for your procedure.  You will also receive a summary report with the findings from this procedure and any further recommendations.  If you had polyps removed or biopsies taken during your procedure, you will receive a separate letter from me within approximately the next 2 weeks.  If you don't receive this letter or if you have any questions, please call my office [phone].     Please take note of the post procedure instructions listed below.    Dr. Dillon Chung      CARE FOLLOWING YOUR PROCEDURE    These instructions give you information on caring for yourself after your procedure. Call your doctor if you have any problems or questions after your procedure.    HOME CARE  Walk if you have belly cramping or gas.  Walking will help get rid of the air and reduce the bloated feeling in your belly (abdomen).  Your IV site (where you received drugs) may be tender to touch.  Place warm towels on the site; keep your arm up on two pillows if you have any swelling or soreness in the area.  You may shower.    ACTIVITY:  Take frequent rest periods and move at a slower pace for the next 24 hours..  You may resume your regular activity tomorrow if you are feeling back to normal.  Do not drive or ride a bicycle for at least 24 hours (because of the medicine (anesthesia) used during the test).  Do not sign any important legal documents or use or operate any machinery for 24 hours  Do not take sleeping medicines/nerve drugs for 24 hours unless the doctor tells you.  You can return to work/school tomorrow unless otherwise instructed.    NUTRITION:  Drink plenty of fluids to keep your pee (urine) clear or pale yellow  Begin with a light meal and progress to your normal diet. Heavy or fried foods are harder to digest and may make you feel sick to your stomach

## 2025-01-09 ENCOUNTER — HOSPITAL ENCOUNTER (OUTPATIENT)
Age: 75
Discharge: HOME | End: 2025-01-12
Payer: MEDICARE

## 2025-01-09 DIAGNOSIS — M85.89 OSTEOPENIA OF MULTIPLE SITES: ICD-10-CM

## 2025-01-09 PROCEDURE — 77080 DXA BONE DENSITY AXIAL: CPT

## 2025-02-06 RX ORDER — TACROLIMUS 1 MG/1
1 CAPSULE ORAL 2 TIMES DAILY
Qty: 180 CAPSULE | Refills: 3 | OUTPATIENT
Start: 2025-02-06

## 2025-02-06 RX ORDER — TACROLIMUS 1 MG/1
1 CAPSULE ORAL 2 TIMES DAILY
Qty: 180 CAPSULE | Refills: 3 | Status: SHIPPED | OUTPATIENT
Start: 2025-02-06

## 2025-03-06 DIAGNOSIS — K75.4 AUTOIMMUNE HEPATITIS (HCC): Primary | ICD-10-CM

## 2025-03-07 ENCOUNTER — CLINICAL DOCUMENTATION (OUTPATIENT)
Age: 75
End: 2025-03-07

## 2025-03-07 NOTE — PROGRESS NOTES
Elizabeth George PA Quarles, Sharon, LPN  Please mail pt copy of labs placed 3/6/25.  Thanks.    3/7/25 0830: Lab order placed on 3/6/25 mailed to pt

## 2025-04-19 LAB
ALBUMIN SERPL-MCNC: 4.4 G/DL (ref 3.8–4.8)
ALP SERPL-CCNC: 74 IU/L (ref 44–121)
ALT SERPL-CCNC: 89 IU/L (ref 0–32)
AST SERPL-CCNC: 53 IU/L (ref 0–40)
BILIRUB DIRECT SERPL-MCNC: 0.18 MG/DL (ref 0–0.4)
BILIRUB SERPL-MCNC: 0.5 MG/DL (ref 0–1.2)
BUN SERPL-MCNC: 24 MG/DL (ref 8–27)
BUN/CREAT SERPL: 27 (ref 12–28)
CALCIUM SERPL-MCNC: 9.9 MG/DL (ref 8.7–10.3)
CHLORIDE SERPL-SCNC: 103 MMOL/L (ref 96–106)
CO2 SERPL-SCNC: 26 MMOL/L (ref 20–29)
CREAT SERPL-MCNC: 0.88 MG/DL (ref 0.57–1)
EGFRCR SERPLBLD CKD-EPI 2021: 69 ML/MIN/1.73
ERYTHROCYTE [DISTWIDTH] IN BLOOD BY AUTOMATED COUNT: 12.4 % (ref 11.7–15.4)
GLUCOSE SERPL-MCNC: 95 MG/DL (ref 70–99)
HCT VFR BLD AUTO: 45.6 % (ref 34–46.6)
HGB BLD-MCNC: 15 G/DL (ref 11.1–15.9)
MCH RBC QN AUTO: 29.9 PG (ref 26.6–33)
MCHC RBC AUTO-ENTMCNC: 32.9 G/DL (ref 31.5–35.7)
MCV RBC AUTO: 91 FL (ref 79–97)
PLATELET # BLD AUTO: 218 X10E3/UL (ref 150–450)
POTASSIUM SERPL-SCNC: 4.8 MMOL/L (ref 3.5–5.2)
PROT SERPL-MCNC: 7.5 G/DL (ref 6–8.5)
RBC # BLD AUTO: 5.01 X10E6/UL (ref 3.77–5.28)
SODIUM SERPL-SCNC: 138 MMOL/L (ref 134–144)
WBC # BLD AUTO: 5.5 X10E3/UL (ref 3.4–10.8)

## 2025-04-20 LAB — TACROLIMUS BLD LC/MS/MS-MCNC: 4.9 NG/ML (ref 5–20)

## 2025-04-23 ENCOUNTER — RESULTS FOLLOW-UP (OUTPATIENT)
Age: 75
End: 2025-04-23

## 2025-04-23 ENCOUNTER — TELEMEDICINE (OUTPATIENT)
Age: 75
End: 2025-04-23
Payer: MEDICARE

## 2025-04-23 DIAGNOSIS — K75.4 AUTOIMMUNE HEPATITIS (HCC): Primary | ICD-10-CM

## 2025-04-23 PROCEDURE — 99214 OFFICE O/P EST MOD 30 MIN: CPT | Performed by: PHYSICIAN ASSISTANT

## 2025-04-23 ASSESSMENT — ANXIETY QUESTIONNAIRES
1. FEELING NERVOUS, ANXIOUS, OR ON EDGE: NOT AT ALL
3. WORRYING TOO MUCH ABOUT DIFFERENT THINGS: NOT AT ALL
7. FEELING AFRAID AS IF SOMETHING AWFUL MIGHT HAPPEN: NOT AT ALL
IF YOU CHECKED OFF ANY PROBLEMS ON THIS QUESTIONNAIRE, HOW DIFFICULT HAVE THESE PROBLEMS MADE IT FOR YOU TO DO YOUR WORK, TAKE CARE OF THINGS AT HOME, OR GET ALONG WITH OTHER PEOPLE: NOT DIFFICULT AT ALL
5. BEING SO RESTLESS THAT IT IS HARD TO SIT STILL: NOT AT ALL
6. BECOMING EASILY ANNOYED OR IRRITABLE: NOT AT ALL
GAD7 TOTAL SCORE: 0
2. NOT BEING ABLE TO STOP OR CONTROL WORRYING: NOT AT ALL
4. TROUBLE RELAXING: NOT AT ALL

## 2025-04-23 ASSESSMENT — PATIENT HEALTH QUESTIONNAIRE - PHQ9
SUM OF ALL RESPONSES TO PHQ QUESTIONS 1-9: 0
SUM OF ALL RESPONSES TO PHQ QUESTIONS 1-9: 0
2. FEELING DOWN, DEPRESSED OR HOPELESS: NOT AT ALL
SUM OF ALL RESPONSES TO PHQ QUESTIONS 1-9: 0
SUM OF ALL RESPONSES TO PHQ QUESTIONS 1-9: 0
DEPRESSION UNABLE TO ASSESS: FUNCTIONAL CAPACITY MOTIVATION LIMITS ACCURACY
1. LITTLE INTEREST OR PLEASURE IN DOING THINGS: NOT AT ALL

## 2025-04-23 NOTE — PROGRESS NOTES
Chief Complaint   Patient presents with    Follow-up     \"Have you been to the ER, urgent care clinic since your last visit?  Hospitalized since your last visit?\"    NO    “Have you seen or consulted any other health care providers outside our system since your last visit?”    NO    Have you had a mammogram?”   NO    No breast cancer screening on file              
In the right lobe of liver  there is a 7.9 x 7.8 x 7.3 cm simple cyst..   10/2022.  CT scan abdomen with and without IV contrast.The liver measures 18.5 cm longitudinal. There is a cyst of the RIGHT hepatic lobe segment 6 which measures 9.2 x 7.6 cm on series 5, image 35. It measures 8.3 cm craniocaudal on coronal series 601, slice 59. The cyst measures simple fluid density with no enhancing components. The wall is thin and there is a single, thin septation observed along the anterior margin. No mural nodularity is identified. The cyst abuts the main portal vein and the RIGHT portal vein without narrowing or occlusion. There is a 5 cm simple cyst of the LEFT kidney upper pole which requires no further follow-up. 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  5/2023. CT abdomen at [facility]. Superior mesenteric artery dissection appears unchanged in size and extent. Dominant right hepatic cyst is unchanged in size, measuring 9.2 x 7.5 x cm in axial dimension.    OTHER TESTING:  Not available or performed    FOLLOW-UP AFTER VIRTUAL VISIT:   All of the issues listed above in the Assessment and Plan were discussed with the patient.  All questions were answered.  The patient expressed a clear understanding of the above.    Follow-up [facility] in 6 months for virtual ok.  Will continue to monitor labs locally on a monthly basis to determine any need for change in immunosuppression.  New standing order mailed to patient.    Documentation reviewed and updated to reflect current, accurate patient information.    Elizabeth George PA-C  [address], [address]  [place]  [address] [phone]  [facility]

## 2025-05-06 RX ORDER — TACROLIMUS 1 MG/1
1 CAPSULE ORAL 2 TIMES DAILY
Qty: 180 CAPSULE | Refills: 3 | Status: SHIPPED | OUTPATIENT
Start: 2025-05-06

## 2025-05-16 DIAGNOSIS — K75.4 AUTOIMMUNE HEPATITIS (HCC): ICD-10-CM

## 2025-05-22 LAB
ALBUMIN SERPL-MCNC: 4.7 G/DL (ref 3.8–4.8)
ALP SERPL-CCNC: 66 IU/L (ref 44–121)
ALT SERPL-CCNC: 62 IU/L (ref 0–32)
AST SERPL-CCNC: 46 IU/L (ref 0–40)
BILIRUB DIRECT SERPL-MCNC: 0.18 MG/DL (ref 0–0.4)
BILIRUB SERPL-MCNC: 0.4 MG/DL (ref 0–1.2)
PROT SERPL-MCNC: 7.6 G/DL (ref 6–8.5)

## 2025-05-23 ENCOUNTER — RESULTS FOLLOW-UP (OUTPATIENT)
Age: 75
End: 2025-05-23

## 2025-05-23 LAB — TACROLIMUS BLD LC/MS/MS-MCNC: 4.7 NG/ML (ref 5–20)

## 2025-06-20 LAB
ERYTHROCYTE [DISTWIDTH] IN BLOOD BY AUTOMATED COUNT: 13 % (ref 11.7–15.4)
HCT VFR BLD AUTO: 45.8 % (ref 34–46.6)
HGB BLD-MCNC: 14.7 G/DL (ref 11.1–15.9)
MCH RBC QN AUTO: 29.6 PG (ref 26.6–33)
MCHC RBC AUTO-ENTMCNC: 32.1 G/DL (ref 31.5–35.7)
MCV RBC AUTO: 92 FL (ref 79–97)
PLATELET # BLD AUTO: 183 X10E3/UL (ref 150–450)
RBC # BLD AUTO: 4.97 X10E6/UL (ref 3.77–5.28)
WBC # BLD AUTO: 5.3 X10E3/UL (ref 3.4–10.8)

## 2025-06-21 LAB
ALBUMIN SERPL-MCNC: 4.6 G/DL (ref 3.8–4.8)
ALP SERPL-CCNC: 62 IU/L (ref 44–121)
ALT SERPL-CCNC: 51 IU/L (ref 0–32)
AST SERPL-CCNC: 37 IU/L (ref 0–40)
BILIRUB DIRECT SERPL-MCNC: 0.26 MG/DL (ref 0–0.4)
BILIRUB SERPL-MCNC: 0.7 MG/DL (ref 0–1.2)
BUN SERPL-MCNC: 24 MG/DL (ref 8–27)
BUN/CREAT SERPL: 28 (ref 12–28)
CALCIUM SERPL-MCNC: 9.7 MG/DL (ref 8.7–10.3)
CHLORIDE SERPL-SCNC: 101 MMOL/L (ref 96–106)
CO2 SERPL-SCNC: 22 MMOL/L (ref 20–29)
CREAT SERPL-MCNC: 0.87 MG/DL (ref 0.57–1)
EGFRCR SERPLBLD CKD-EPI 2021: 70 ML/MIN/1.73
GLUCOSE SERPL-MCNC: 102 MG/DL (ref 70–99)
POTASSIUM SERPL-SCNC: 5 MMOL/L (ref 3.5–5.2)
PROT SERPL-MCNC: 7.5 G/DL (ref 6–8.5)
SODIUM SERPL-SCNC: 137 MMOL/L (ref 134–144)

## 2025-06-22 LAB — TACROLIMUS BLD LC/MS/MS-MCNC: 4.5 NG/ML (ref 5–20)

## 2025-06-23 ENCOUNTER — RESULTS FOLLOW-UP (OUTPATIENT)
Age: 75
End: 2025-06-23

## 2025-08-06 LAB
ERYTHROCYTE [DISTWIDTH] IN BLOOD BY AUTOMATED COUNT: 12.4 % (ref 11.7–15.4)
HCT VFR BLD AUTO: 45.5 % (ref 34–46.6)
HGB BLD-MCNC: 14.6 G/DL (ref 11.1–15.9)
MCH RBC QN AUTO: 30.4 PG (ref 26.6–33)
MCHC RBC AUTO-ENTMCNC: 32.1 G/DL (ref 31.5–35.7)
MCV RBC AUTO: 95 FL (ref 79–97)
PLATELET # BLD AUTO: 215 X10E3/UL (ref 150–450)
RBC # BLD AUTO: 4.81 X10E6/UL (ref 3.77–5.28)
WBC # BLD AUTO: 6 X10E3/UL (ref 3.4–10.8)

## 2025-08-07 LAB
ALBUMIN SERPL-MCNC: 4.7 G/DL (ref 3.8–4.8)
ALP SERPL-CCNC: 62 IU/L (ref 44–121)
ALT SERPL-CCNC: 59 IU/L (ref 0–32)
AST SERPL-CCNC: 41 IU/L (ref 0–40)
BILIRUB DIRECT SERPL-MCNC: 0.17 MG/DL (ref 0–0.4)
BILIRUB SERPL-MCNC: 0.5 MG/DL (ref 0–1.2)
BUN SERPL-MCNC: 20 MG/DL (ref 8–27)
BUN/CREAT SERPL: 20 (ref 12–28)
CALCIUM SERPL-MCNC: 9.9 MG/DL (ref 8.7–10.3)
CHLORIDE SERPL-SCNC: 100 MMOL/L (ref 96–106)
CO2 SERPL-SCNC: 22 MMOL/L (ref 20–29)
CREAT SERPL-MCNC: 0.99 MG/DL (ref 0.57–1)
EGFRCR SERPLBLD CKD-EPI 2021: 60 ML/MIN/1.73
GLUCOSE SERPL-MCNC: 95 MG/DL (ref 70–99)
POTASSIUM SERPL-SCNC: 4.9 MMOL/L (ref 3.5–5.2)
PROT SERPL-MCNC: 7.7 G/DL (ref 6–8.5)
SODIUM SERPL-SCNC: 138 MMOL/L (ref 134–144)

## 2025-08-08 LAB — TACROLIMUS BLD LC/MS/MS-MCNC: 3.9 NG/ML (ref 5–20)

## 2025-08-12 DIAGNOSIS — K75.4 AUTOIMMUNE HEPATITIS (HCC): Primary | ICD-10-CM

## 2025-08-12 RX ORDER — TACROLIMUS 1 MG/1
CAPSULE ORAL
Qty: 90 CAPSULE | Refills: 5 | Status: SHIPPED | OUTPATIENT
Start: 2025-08-12

## 2025-08-13 ENCOUNTER — CLINICAL DOCUMENTATION (OUTPATIENT)
Age: 75
End: 2025-08-13

## (undated) DEVICE — FORCEPS BX L240CM JAW DIA2.4MM ORNG L CAP W/ NDL DISP RAD

## (undated) DEVICE — TUBING IRRIG COMPATIBLE W ERBE MEDIVATOR PMP HYDR

## (undated) DEVICE — SUPPLEMENT DIGESTIVE H2O SOL GI-EASE

## (undated) DEVICE — TRAY BX SFT TISS W/ RUL ALC PREP PD FEN DRP TWL LUERLOCK

## (undated) DEVICE — MAX-CORE® DISPOSABLE CORE BIOPSY INSTRUMENT, 16G X 16CM: Brand: MAX-CORE

## (undated) DEVICE — TRAP SURG QUAD PARABOLA SLOT DSGN SFTY SCRN TRAPEASE

## (undated) DEVICE — SNARE VASC L240CM LOOP W10MM SHTH DIA2.4MM RND STIFF CLD